# Patient Record
Sex: FEMALE | Race: BLACK OR AFRICAN AMERICAN | NOT HISPANIC OR LATINO | URBAN - METROPOLITAN AREA
[De-identification: names, ages, dates, MRNs, and addresses within clinical notes are randomized per-mention and may not be internally consistent; named-entity substitution may affect disease eponyms.]

---

## 2018-01-08 ENCOUNTER — EMERGENCY (EMERGENCY)
Facility: HOSPITAL | Age: 43
LOS: 1 days | Discharge: HOME | End: 2018-01-08
Admitting: EMERGENCY MEDICINE
Payer: COMMERCIAL

## 2018-01-08 VITALS
TEMPERATURE: 98 F | OXYGEN SATURATION: 96 % | RESPIRATION RATE: 18 BRPM | DIASTOLIC BLOOD PRESSURE: 76 MMHG | SYSTOLIC BLOOD PRESSURE: 147 MMHG | WEIGHT: 205.25 LBS | HEART RATE: 94 BPM

## 2018-01-08 DIAGNOSIS — R05 COUGH: ICD-10-CM

## 2018-01-08 DIAGNOSIS — Z79.2 LONG TERM (CURRENT) USE OF ANTIBIOTICS: ICD-10-CM

## 2018-01-08 DIAGNOSIS — J06.9 ACUTE UPPER RESPIRATORY INFECTION, UNSPECIFIED: ICD-10-CM

## 2018-01-08 DIAGNOSIS — Z88.0 ALLERGY STATUS TO PENICILLIN: ICD-10-CM

## 2018-01-08 DIAGNOSIS — Z79.899 OTHER LONG TERM (CURRENT) DRUG THERAPY: ICD-10-CM

## 2018-01-08 PROCEDURE — 71046 X-RAY EXAM CHEST 2 VIEWS: CPT

## 2018-01-08 PROCEDURE — 99284 EMERGENCY DEPT VISIT MOD MDM: CPT

## 2018-01-08 PROCEDURE — 71046 X-RAY EXAM CHEST 2 VIEWS: CPT | Mod: 26

## 2018-01-08 PROCEDURE — 99283 EMERGENCY DEPT VISIT LOW MDM: CPT | Mod: 25

## 2018-01-08 RX ORDER — AZITHROMYCIN 500 MG/1
1 TABLET, FILM COATED ORAL
Qty: 6 | Refills: 0
Start: 2018-01-08

## 2018-01-08 NOTE — ED PROVIDER NOTE - MEDICAL DECISION MAKING DETAILS
41 y/o f cough, fever x 2 weeks; cxr shows ?right middle lobe infiltrate, treated with zpack, pt coughing throughout visit, given cough medication, f/u pmd

## 2018-01-08 NOTE — ED PROVIDER NOTE - OBJECTIVE STATEMENT
43 y/o f presents c/o cough productive of yellowish sputum for the past 2 weeks.  Pt stating cough worsens at night, also has been feeling chills.  Denies n/v/d, CP, SOB, all other ROS negative.

## 2018-03-06 NOTE — ED PROVIDER NOTE - HISTORY ATTESTATION, MLM
I have reviewed and confirmed nurses' notes... no discharge, no irritation, no pain, no redness, and no visual changes.

## 2018-12-19 ENCOUNTER — EMERGENCY (EMERGENCY)
Facility: HOSPITAL | Age: 43
LOS: 1 days | Discharge: HOME | End: 2018-12-19
Attending: EMERGENCY MEDICINE | Admitting: EMERGENCY MEDICINE
Payer: COMMERCIAL

## 2018-12-19 VITALS
OXYGEN SATURATION: 100 % | SYSTOLIC BLOOD PRESSURE: 153 MMHG | RESPIRATION RATE: 18 BRPM | TEMPERATURE: 98 F | HEART RATE: 88 BPM | DIASTOLIC BLOOD PRESSURE: 91 MMHG

## 2018-12-19 DIAGNOSIS — R53.1 WEAKNESS: ICD-10-CM

## 2018-12-19 DIAGNOSIS — Z79.899 OTHER LONG TERM (CURRENT) DRUG THERAPY: ICD-10-CM

## 2018-12-19 DIAGNOSIS — Z79.2 LONG TERM (CURRENT) USE OF ANTIBIOTICS: ICD-10-CM

## 2018-12-19 DIAGNOSIS — Z88.0 ALLERGY STATUS TO PENICILLIN: ICD-10-CM

## 2018-12-19 DIAGNOSIS — R55 SYNCOPE AND COLLAPSE: ICD-10-CM

## 2018-12-19 LAB
ALBUMIN SERPL ELPH-MCNC: 3.6 G/DL — SIGNIFICANT CHANGE UP (ref 3.4–5)
ALP SERPL-CCNC: 85 U/L — SIGNIFICANT CHANGE UP (ref 40–120)
ALT FLD-CCNC: 27 U/L — SIGNIFICANT CHANGE UP (ref 12–42)
ANION GAP SERPL CALC-SCNC: 11 MMOL/L — SIGNIFICANT CHANGE UP (ref 9–16)
APPEARANCE UR: CLEAR — SIGNIFICANT CHANGE UP
AST SERPL-CCNC: 20 U/L — SIGNIFICANT CHANGE UP (ref 15–37)
BASOPHILS NFR BLD AUTO: 0.6 % — SIGNIFICANT CHANGE UP (ref 0–2)
BILIRUB SERPL-MCNC: 0.2 MG/DL — SIGNIFICANT CHANGE UP (ref 0.2–1.2)
BILIRUB UR-MCNC: NEGATIVE — SIGNIFICANT CHANGE UP
BUN SERPL-MCNC: 11 MG/DL — SIGNIFICANT CHANGE UP (ref 7–23)
CALCIUM SERPL-MCNC: 8.8 MG/DL — SIGNIFICANT CHANGE UP (ref 8.5–10.5)
CHLORIDE SERPL-SCNC: 106 MMOL/L — SIGNIFICANT CHANGE UP (ref 96–108)
CO2 SERPL-SCNC: 26 MMOL/L — SIGNIFICANT CHANGE UP (ref 22–31)
COLOR SPEC: YELLOW — SIGNIFICANT CHANGE UP
CREAT SERPL-MCNC: 0.77 MG/DL — SIGNIFICANT CHANGE UP (ref 0.5–1.3)
DIFF PNL FLD: NEGATIVE — SIGNIFICANT CHANGE UP
EOSINOPHIL NFR BLD AUTO: 4.8 % — SIGNIFICANT CHANGE UP (ref 0–6)
GLUCOSE SERPL-MCNC: 95 MG/DL — SIGNIFICANT CHANGE UP (ref 70–99)
GLUCOSE UR QL: NEGATIVE — SIGNIFICANT CHANGE UP
HCG UR QL: NEGATIVE — SIGNIFICANT CHANGE UP
HCT VFR BLD CALC: 30.4 % — LOW (ref 34.5–45)
HGB BLD-MCNC: 8.7 G/DL — LOW (ref 11.5–15.5)
HIV 1 & 2 AB SERPL IA.RAPID: SIGNIFICANT CHANGE UP
IMM GRANULOCYTES NFR BLD AUTO: 0.2 % — SIGNIFICANT CHANGE UP (ref 0–1.5)
KETONES UR-MCNC: NEGATIVE — SIGNIFICANT CHANGE UP
LEUKOCYTE ESTERASE UR-ACNC: NEGATIVE — SIGNIFICANT CHANGE UP
LYMPHOCYTES # BLD AUTO: 33.9 % — SIGNIFICANT CHANGE UP (ref 13–44)
MAGNESIUM SERPL-MCNC: 1.7 MG/DL — SIGNIFICANT CHANGE UP (ref 1.6–2.6)
MCHC RBC-ENTMCNC: 19.6 PG — LOW (ref 27–34)
MCHC RBC-ENTMCNC: 28.6 G/DL — LOW (ref 32–36)
MCV RBC AUTO: 68.3 FL — LOW (ref 80–100)
MONOCYTES NFR BLD AUTO: 5.6 % — SIGNIFICANT CHANGE UP (ref 2–14)
NEUTROPHILS NFR BLD AUTO: 54.9 % — SIGNIFICANT CHANGE UP (ref 43–77)
NITRITE UR-MCNC: NEGATIVE — SIGNIFICANT CHANGE UP
PH UR: 7 — SIGNIFICANT CHANGE UP (ref 5–8)
PLATELET # BLD AUTO: 523 K/UL — HIGH (ref 150–400)
POTASSIUM SERPL-MCNC: 3.7 MMOL/L — SIGNIFICANT CHANGE UP (ref 3.5–5.3)
POTASSIUM SERPL-SCNC: 3.7 MMOL/L — SIGNIFICANT CHANGE UP (ref 3.5–5.3)
PROT SERPL-MCNC: 7.5 G/DL — SIGNIFICANT CHANGE UP (ref 6.4–8.2)
PROT UR-MCNC: NEGATIVE MG/DL — SIGNIFICANT CHANGE UP
RBC # BLD: 4.45 M/UL — SIGNIFICANT CHANGE UP (ref 3.8–5.2)
RBC # FLD: 17.7 % — HIGH (ref 10.3–14.5)
SODIUM SERPL-SCNC: 143 MMOL/L — SIGNIFICANT CHANGE UP (ref 132–145)
SP GR SPEC: 1.02 — SIGNIFICANT CHANGE UP (ref 1–1.03)
TSH SERPL-MCNC: 1.12 UIU/ML — SIGNIFICANT CHANGE UP (ref 0.36–3.74)
UROBILINOGEN FLD QL: 0.2 E.U./DL — SIGNIFICANT CHANGE UP
WBC # BLD: 5 K/UL — SIGNIFICANT CHANGE UP (ref 3.8–10.5)
WBC # FLD AUTO: 5 K/UL — SIGNIFICANT CHANGE UP (ref 3.8–10.5)

## 2018-12-19 PROCEDURE — 93010 ELECTROCARDIOGRAM REPORT: CPT

## 2018-12-19 PROCEDURE — 99284 EMERGENCY DEPT VISIT MOD MDM: CPT | Mod: 25

## 2018-12-19 RX ORDER — SODIUM CHLORIDE 9 MG/ML
1000 INJECTION INTRAMUSCULAR; INTRAVENOUS; SUBCUTANEOUS ONCE
Qty: 0 | Refills: 0 | Status: COMPLETED | OUTPATIENT
Start: 2018-12-19 | End: 2018-12-19

## 2018-12-19 RX ADMIN — SODIUM CHLORIDE 1000 MILLILITER(S): 9 INJECTION INTRAMUSCULAR; INTRAVENOUS; SUBCUTANEOUS at 18:00

## 2018-12-19 NOTE — ED PROVIDER NOTE - MEDICAL DECISION MAKING DETAILS
Pt presenting with dizziness at work, most consistent with vasovagal episode. Likely compounded by anemia. Will check basic labs and EKG, and give IV fluids.

## 2018-12-19 NOTE — ED PROVIDER NOTE - OBJECTIVE STATEMENT
42 y/o female with PMHx of menorrhagia presents to the ED with complaints of dizziness and near syncopal episode today. Pt states she was on the phone when she developed dizziness, and when she tried to get up, she felt faint and "saw stars like cartoon character do." She admits to feeling fatigued for 2 weeks. She was scheduled to have an iron infusion done but never followed up. No LOC, no fever, no chills, and no vomiting. 42 y/o female with PMHx of menorrhagia presents to the ED with complaints of dizziness and near syncopal episode today. Pt states she was on the phone at work when she developed dizziness, and when she tried to get up, she felt faint and "saw stars like cartoon character do." She admits to feeling fatigued for 2 weeks. She was scheduled to have an iron infusion done but never followed up. No LOC, no fever, no chills, and no vomiting.

## 2019-10-05 VITALS
HEART RATE: 104 BPM | RESPIRATION RATE: 18 BRPM | TEMPERATURE: 98 F | DIASTOLIC BLOOD PRESSURE: 80 MMHG | OXYGEN SATURATION: 97 % | SYSTOLIC BLOOD PRESSURE: 132 MMHG

## 2019-10-05 LAB
ALBUMIN SERPL ELPH-MCNC: 3.2 G/DL — LOW (ref 3.4–5)
ALP SERPL-CCNC: 79 U/L — SIGNIFICANT CHANGE UP (ref 40–120)
ALT FLD-CCNC: 17 U/L — SIGNIFICANT CHANGE UP (ref 12–42)
ANION GAP SERPL CALC-SCNC: 11 MMOL/L — SIGNIFICANT CHANGE UP (ref 9–16)
APTT BLD: 27.3 SEC — LOW (ref 27.5–36.3)
AST SERPL-CCNC: 16 U/L — SIGNIFICANT CHANGE UP (ref 15–37)
BILIRUB SERPL-MCNC: 0.2 MG/DL — SIGNIFICANT CHANGE UP (ref 0.2–1.2)
BUN SERPL-MCNC: 14 MG/DL — SIGNIFICANT CHANGE UP (ref 7–23)
CALCIUM SERPL-MCNC: 8.3 MG/DL — LOW (ref 8.5–10.5)
CHLORIDE SERPL-SCNC: 108 MMOL/L — SIGNIFICANT CHANGE UP (ref 96–108)
CK SERPL-CCNC: 107 U/L — SIGNIFICANT CHANGE UP (ref 26–192)
CO2 SERPL-SCNC: 23 MMOL/L — SIGNIFICANT CHANGE UP (ref 22–31)
CREAT SERPL-MCNC: 0.85 MG/DL — SIGNIFICANT CHANGE UP (ref 0.5–1.3)
D DIMER BLD IA.RAPID-MCNC: 1418 NG/ML DDU — HIGH
GLUCOSE SERPL-MCNC: 87 MG/DL — SIGNIFICANT CHANGE UP (ref 70–99)
HCT VFR BLD CALC: 25.1 % — LOW (ref 34.5–45)
HGB BLD-MCNC: 7.2 G/DL — LOW (ref 11.5–15.5)
INR BLD: 0.97 — SIGNIFICANT CHANGE UP (ref 0.88–1.16)
MCHC RBC-ENTMCNC: 19.3 PG — LOW (ref 27–34)
MCHC RBC-ENTMCNC: 28.7 G/DL — LOW (ref 32–36)
MCV RBC AUTO: 67.3 FL — LOW (ref 80–100)
NT-PROBNP SERPL-SCNC: 508 PG/ML — HIGH
PLATELET # BLD AUTO: 470 K/UL — HIGH (ref 150–400)
POTASSIUM SERPL-MCNC: 4 MMOL/L — SIGNIFICANT CHANGE UP (ref 3.5–5.3)
POTASSIUM SERPL-SCNC: 4 MMOL/L — SIGNIFICANT CHANGE UP (ref 3.5–5.3)
PROT SERPL-MCNC: 7.4 G/DL — SIGNIFICANT CHANGE UP (ref 6.4–8.2)
PROTHROM AB SERPL-ACNC: 10.7 SEC — SIGNIFICANT CHANGE UP (ref 10–12.9)
RBC # BLD: 3.73 M/UL — LOW (ref 3.8–5.2)
RBC # FLD: 16.3 % — HIGH (ref 10.3–14.5)
SODIUM SERPL-SCNC: 142 MMOL/L — SIGNIFICANT CHANGE UP (ref 132–145)
TROPONIN I SERPL-MCNC: <0.017 NG/ML — LOW (ref 0.02–0.06)
WBC # BLD: 6.5 K/UL — SIGNIFICANT CHANGE UP (ref 3.8–10.5)
WBC # FLD AUTO: 6.5 K/UL — SIGNIFICANT CHANGE UP (ref 3.8–10.5)

## 2019-10-05 PROCEDURE — 71046 X-RAY EXAM CHEST 2 VIEWS: CPT | Mod: 26

## 2019-10-05 PROCEDURE — 76856 US EXAM PELVIC COMPLETE: CPT | Mod: 26

## 2019-10-05 PROCEDURE — 76830 TRANSVAGINAL US NON-OB: CPT | Mod: 26

## 2019-10-05 PROCEDURE — 71275 CT ANGIOGRAPHY CHEST: CPT | Mod: 26

## 2019-10-05 PROCEDURE — 73590 X-RAY EXAM OF LOWER LEG: CPT | Mod: 26,RT

## 2019-10-05 RX ORDER — IBUPROFEN 200 MG
600 TABLET ORAL ONCE
Refills: 0 | Status: COMPLETED | OUTPATIENT
Start: 2019-10-05 | End: 2019-10-05

## 2019-10-05 RX ORDER — ACETAMINOPHEN WITH CODEINE 300MG-30MG
1 TABLET ORAL ONCE
Refills: 0 | Status: DISCONTINUED | OUTPATIENT
Start: 2019-10-05 | End: 2019-10-05

## 2019-10-05 RX ADMIN — Medication 600 MILLIGRAM(S): at 20:40

## 2019-10-05 RX ADMIN — Medication 600 MILLIGRAM(S): at 20:19

## 2019-10-05 RX ADMIN — Medication 1 TABLET(S): at 20:19

## 2019-10-05 RX ADMIN — Medication 300 MILLIGRAM(S): at 20:18

## 2019-10-05 RX ADMIN — Medication 0.5 MILLIGRAM(S): at 22:36

## 2019-10-05 RX ADMIN — Medication 1 TABLET(S): at 20:40

## 2019-10-05 RX ADMIN — Medication 1 TABLET(S): at 20:18

## 2019-10-05 NOTE — ED ADULT NURSE NOTE - OBJECTIVE STATEMENT
5 y/o Female with PMHx of Anemia, Arthritis, Menorrhagia and Mitral valve regurgitation, allergy to penicillin, presents to the ED c/o cat bite wound to the R ankle x4 days, and SOB with exertion x2 weeks. States the wound is swelling and has redness around the area. Of note, pt had cat bites and scratches to the other leg as well but never had sx. Also states the SOB is unusual for her since she is an active person but lately even after going up some stairs, there is an onset of SOB. Pt also complains of pain to the lower back and suspects possible fibroids. Of note, pt had Myomectomy in October 2018. States her Gynecologist put her on OCP but she is still bleeding excessively during her menses. Today, pt took 800 mg of Motrin with no relief. Denies chance of pregnancy. Denies fever, chills, N/V/D, palpitations, diaphoresis, wheezing, peripheral edema, numbness, tingling, HA, dizziness, neck pain, abdominal pain, change in urinary/bowel function, fall, and rash.  Of note, pt is suppose to follow up with a Cardiologist for lower leg swelling but has been non-compliant and request a referral.

## 2019-10-05 NOTE — ED PROVIDER NOTE - OBJECTIVE STATEMENT
43 y/o Female with PMHx of Anemia, Arthritis, Menorrhagia and Mitral valve regurgitation, allergy to penicillin, presents to the ED c/o cat bite wound to the R ankle x4 days, and SOB with exertion x2 weeks. States the wound is swelling and has redness around the area. Of note, pt had cat bites and scratches to the other leg as well but never had sx. Also states the SOB is unusual for her since she is an active person but lately even after going up some stairs, there is an onset of SOB. Pt also complains of pain to the lower back and suspects possible fibroids. Of note, pt had Myomectomy in October 2018. States her Gynecologist put her on OCP but she is still bleeding excessively during her menses. Today, pt took 800 mg of Motrin with no relief. Denies chance of pregnancy. Denies fever, chills, N/V/D, palpitations, diaphoresis, wheezing, peripheral edema, numbness, tingling, HA, dizziness, neck pain, abdominal pain, change in urinary/bowel function, fall, and rash.  Of note, pt is suppose to follow up with a Cardiologist for lower leg swelling but has been non-compliant and request a referral.

## 2019-10-05 NOTE — ED PROVIDER NOTE - PROGRESS NOTE DETAILS
spoke to dr andrews, requested regional medicine, start heparin drip. patient with no active menses. patient consented to admission and transfer.

## 2019-10-05 NOTE — ED PROVIDER NOTE - CLINICAL SUMMARY MEDICAL DECISION MAKING FREE TEXT BOX
bilateral PE's on CT scan in ED today, cat bite to R leg with cellulitis, severe symptomatic anemia, requires admission to monitored bed

## 2019-10-05 NOTE — ED ADULT TRIAGE NOTE - CHIEF COMPLAINT QUOTE
Patient to ED with multiple medical complaints including dyspnea on exertion, cat bite to right leg, and fibroid pain in abdomen.  Patient denies CP and in no acute distress

## 2019-10-05 NOTE — ED PROVIDER NOTE - CRITICAL CARE PROVIDED
additional history taking/interpretation of diagnostic studies/direct patient care (not related to procedure)/consultation with other physicians/documentation

## 2019-10-05 NOTE — ED PROVIDER NOTE - CARE PLAN
Principal Discharge DX:	Acute pulmonary embolism without acute cor pulmonale, unspecified pulmonary embolism type  Secondary Diagnosis:	Iron deficiency anemia due to chronic blood loss  Secondary Diagnosis:	Cellulitis of right lower extremity

## 2019-10-05 NOTE — ED PROVIDER NOTE - CHPI ED SYMPTOMS NEG
no fever, chills, N/V/D, palpitations, diaphoresis, wheezing, peripheral edema, numbness, tingling, HA, dizziness, neck pain, abdominal pain, change in urinary/bowel function, fall, and rash.

## 2019-10-05 NOTE — ED ADULT NURSE NOTE - NSIMPLEMENTINTERV_GEN_ALL_ED
Implemented All Universal Safety Interventions:  [place] to call system. Call bell, personal items and telephone within reach. Instruct patient to call for assistance. Room bathroom lighting operational. Non-slip footwear when patient is off stretcher. Physically safe environment: no spills, clutter or unnecessary equipment. Stretcher in lowest position, wheels locked, appropriate side rails in place.

## 2019-10-06 ENCOUNTER — INPATIENT (INPATIENT)
Facility: HOSPITAL | Age: 44
LOS: 3 days | Discharge: HOME | DRG: 982 | End: 2019-10-10
Attending: INTERNAL MEDICINE | Admitting: INTERNAL MEDICINE
Payer: COMMERCIAL

## 2019-10-06 DIAGNOSIS — R09.89 OTHER SPECIFIED SYMPTOMS AND SIGNS INVOLVING THE CIRCULATORY AND RESPIRATORY SYSTEMS: ICD-10-CM

## 2019-10-06 DIAGNOSIS — R63.8 OTHER SYMPTOMS AND SIGNS CONCERNING FOOD AND FLUID INTAKE: ICD-10-CM

## 2019-10-06 DIAGNOSIS — L03.115 CELLULITIS OF RIGHT LOWER LIMB: ICD-10-CM

## 2019-10-06 DIAGNOSIS — I26.99 OTHER PULMONARY EMBOLISM WITHOUT ACUTE COR PULMONALE: ICD-10-CM

## 2019-10-06 DIAGNOSIS — B20 HUMAN IMMUNODEFICIENCY VIRUS [HIV] DISEASE: Chronic | ICD-10-CM

## 2019-10-06 DIAGNOSIS — Z98.890 OTHER SPECIFIED POSTPROCEDURAL STATES: Chronic | ICD-10-CM

## 2019-10-06 DIAGNOSIS — D50.0 IRON DEFICIENCY ANEMIA SECONDARY TO BLOOD LOSS (CHRONIC): ICD-10-CM

## 2019-10-06 DIAGNOSIS — N92.0 EXCESSIVE AND FREQUENT MENSTRUATION WITH REGULAR CYCLE: ICD-10-CM

## 2019-10-06 DIAGNOSIS — Z91.89 OTHER SPECIFIED PERSONAL RISK FACTORS, NOT ELSEWHERE CLASSIFIED: ICD-10-CM

## 2019-10-06 LAB
APTT BLD: 79.7 SEC — HIGH (ref 27.5–36.3)
BLD GP AB SCN SERPL QL: NEGATIVE — SIGNIFICANT CHANGE UP
BLD GP AB SCN SERPL QL: NEGATIVE — SIGNIFICANT CHANGE UP
HCT VFR BLD CALC: 23.9 % — LOW (ref 34.5–45)
HCT VFR BLD CALC: 26.7 % — LOW (ref 34.5–45)
HCT VFR BLD CALC: 31.7 % — LOW (ref 34.5–45)
HGB BLD-MCNC: 6.7 G/DL — CRITICAL LOW (ref 11.5–15.5)
HGB BLD-MCNC: 7.5 G/DL — LOW (ref 11.5–15.5)
HGB BLD-MCNC: 9.3 G/DL — LOW (ref 11.5–15.5)
HIV 1+2 AB+HIV1 P24 AG SERPL QL IA: SIGNIFICANT CHANGE UP
MCHC RBC-ENTMCNC: 18.7 PG — LOW (ref 27–34)
MCHC RBC-ENTMCNC: 19.9 PG — LOW (ref 27–34)
MCHC RBC-ENTMCNC: 21.2 PG — LOW (ref 27–34)
MCHC RBC-ENTMCNC: 28 G/DL — LOW (ref 32–36)
MCHC RBC-ENTMCNC: 28.1 GM/DL — LOW (ref 32–36)
MCHC RBC-ENTMCNC: 29.3 GM/DL — LOW (ref 32–36)
MCV RBC AUTO: 66.8 FL — LOW (ref 80–100)
MCV RBC AUTO: 70.8 FL — LOW (ref 80–100)
MCV RBC AUTO: 72.4 FL — LOW (ref 80–100)
NRBC # BLD: 0 /100 WBCS — SIGNIFICANT CHANGE UP (ref 0–0)
NRBC # BLD: 0 /100 WBCS — SIGNIFICANT CHANGE UP (ref 0–0)
PLATELET # BLD AUTO: 428 K/UL — HIGH (ref 150–400)
PLATELET # BLD AUTO: 430 K/UL — HIGH (ref 150–400)
PLATELET # BLD AUTO: 453 K/UL — HIGH (ref 150–400)
RBC # BLD: 3.58 M/UL — LOW (ref 3.8–5.2)
RBC # BLD: 3.77 M/UL — LOW (ref 3.8–5.2)
RBC # BLD: 4.38 M/UL — SIGNIFICANT CHANGE UP (ref 3.8–5.2)
RBC # FLD: 16.4 % — HIGH (ref 10.3–14.5)
RBC # FLD: 18.3 % — HIGH (ref 10.3–14.5)
RBC # FLD: 19.5 % — HIGH (ref 10.3–14.5)
RH IG SCN BLD-IMP: POSITIVE — SIGNIFICANT CHANGE UP
RH IG SCN BLD-IMP: POSITIVE — SIGNIFICANT CHANGE UP
WBC # BLD: 6.09 K/UL — SIGNIFICANT CHANGE UP (ref 3.8–10.5)
WBC # BLD: 6.4 K/UL — SIGNIFICANT CHANGE UP (ref 3.8–10.5)
WBC # BLD: 7.05 K/UL — SIGNIFICANT CHANGE UP (ref 3.8–10.5)
WBC # FLD AUTO: 6.09 K/UL — SIGNIFICANT CHANGE UP (ref 3.8–10.5)
WBC # FLD AUTO: 6.4 K/UL — SIGNIFICANT CHANGE UP (ref 3.8–10.5)
WBC # FLD AUTO: 7.05 K/UL — SIGNIFICANT CHANGE UP (ref 3.8–10.5)

## 2019-10-06 PROCEDURE — 99222 1ST HOSP IP/OBS MODERATE 55: CPT

## 2019-10-06 PROCEDURE — 93010 ELECTROCARDIOGRAM REPORT: CPT

## 2019-10-06 PROCEDURE — 99291 CRITICAL CARE FIRST HOUR: CPT

## 2019-10-06 PROCEDURE — 99223 1ST HOSP IP/OBS HIGH 75: CPT | Mod: GC

## 2019-10-06 RX ORDER — HEPARIN SODIUM 5000 [USP'U]/ML
INJECTION INTRAVENOUS; SUBCUTANEOUS
Qty: 25000 | Refills: 0 | Status: DISCONTINUED | OUTPATIENT
Start: 2019-10-06 | End: 2019-10-07

## 2019-10-06 RX ORDER — SODIUM CHLORIDE 9 MG/ML
500 INJECTION, SOLUTION INTRAVENOUS ONCE
Refills: 0 | Status: COMPLETED | OUTPATIENT
Start: 2019-10-06 | End: 2019-10-06

## 2019-10-06 RX ORDER — INFLUENZA VIRUS VACCINE 15; 15; 15; 15 UG/.5ML; UG/.5ML; UG/.5ML; UG/.5ML
0.5 SUSPENSION INTRAMUSCULAR ONCE
Refills: 0 | Status: COMPLETED | OUTPATIENT
Start: 2019-10-06 | End: 2019-10-06

## 2019-10-06 RX ORDER — OXYCODONE AND ACETAMINOPHEN 5; 325 MG/1; MG/1
1 TABLET ORAL ONCE
Refills: 0 | Status: DISCONTINUED | OUTPATIENT
Start: 2019-10-06 | End: 2019-10-06

## 2019-10-06 RX ORDER — METRONIDAZOLE 500 MG
500 TABLET ORAL EVERY 8 HOURS
Refills: 0 | Status: DISCONTINUED | OUTPATIENT
Start: 2019-10-06 | End: 2019-10-10

## 2019-10-06 RX ORDER — ACETAMINOPHEN 500 MG
650 TABLET ORAL EVERY 6 HOURS
Refills: 0 | Status: DISCONTINUED | OUTPATIENT
Start: 2019-10-06 | End: 2019-10-06

## 2019-10-06 RX ADMIN — OXYCODONE AND ACETAMINOPHEN 1 TABLET(S): 5; 325 TABLET ORAL at 11:14

## 2019-10-06 RX ADMIN — Medication 1 TABLET(S): at 23:02

## 2019-10-06 RX ADMIN — Medication 500 MILLIGRAM(S): at 14:50

## 2019-10-06 RX ADMIN — HEPARIN SODIUM 1800 UNIT(S)/HR: 5000 INJECTION INTRAVENOUS; SUBCUTANEOUS at 14:50

## 2019-10-06 RX ADMIN — OXYCODONE AND ACETAMINOPHEN 1 TABLET(S): 5; 325 TABLET ORAL at 12:14

## 2019-10-06 RX ADMIN — OXYCODONE AND ACETAMINOPHEN 1 TABLET(S): 5; 325 TABLET ORAL at 18:06

## 2019-10-06 RX ADMIN — Medication 1 TABLET(S): at 12:37

## 2019-10-06 RX ADMIN — SODIUM CHLORIDE 500 MILLILITER(S): 9 INJECTION, SOLUTION INTRAVENOUS at 06:51

## 2019-10-06 RX ADMIN — Medication 500 MILLIGRAM(S): at 21:31

## 2019-10-06 NOTE — H&P ADULT - ASSESSMENT
43 yo F with PMH of fibroids and MVP who presents with complaints of shortness of breath and RLE pain and swelling secondary to a cat bite, with confirmed PE on CT imaging and concerns for lower extremity cellulitis.

## 2019-10-06 NOTE — CONSULT NOTE ADULT - PROBLEM SELECTOR RECOMMENDATION 9
Right segmental and subsegmental PE provoked by OCP use.   Very low risk PE with PESI score 44Trop negative,   No evidence of right heart strain on CT scan    Recommendations:   - OK to start heparin drip. discussed with Gyn team in setting of vaginal bleeding requiring transfusion, heparin drip is OK and benefit outweighs risks.   - ECHO to evaluate for right heart strain.   -encourage early ambulation with PT.

## 2019-10-06 NOTE — H&P ADULT - PROBLEM SELECTOR PLAN 2
Patient with long history of menorrhagia and 1 month reported history of active bleeding. Currently severely anemic with need for transfusion.  - 1 U PRBC ordered  - patient initially refusing transfusion as concerned about HIV/AIDs as mother became HIV+ s/p transfusion many years ago, but explained that blood is screened for HIV/Hep C prior and risk is very low  - f/u post transfusion CBC  - maintain active T+S Patient with long history of menorrhagia and 1 month reported history of active bleeding. Currently severely anemic with need for transfusion.  - 1 U PRBC ordered  - patient initially refusing transfusion as concerned about HIV/AIDs as mother became HIV+ s/p transfusion many years ago, but explained that blood is screened for HIV/Hep C prior and risk is very low  - f/u post transfusion CBC  - maintain active T+S  - will not continue OCP given noncoagulable state  - f/u gyn consult

## 2019-10-06 NOTE — CONSULT NOTE ADULT - PROBLEM SELECTOR RECOMMENDATION 2
vaginal bleeding from multiple uterine fibroids. Moniter Hb closely and quantify vaginal bleed as number of pads soaked. If significant increase/ hemodynamic instability, may have to hold heparin drip and be considered for IVC filter.     The patient was s/e/d with Dr Miramontes.

## 2019-10-06 NOTE — H&P ADULT - PROBLEM SELECTOR PLAN 3
Patient with suspected cellulitis of lower extremity s/p cat bite without any purulence, edema or fluctuance.   - patient without leukocytosis, fever, or hemodynamic instability  - Per IDSA guidelines, antibiotics not recommended given lack of warning features, absent patient factors (immunocompromised, severe hepatic dysfunction, etc.) Patient with suspected cellulitis of lower extremity s/p cat bite without any purulence, edema or fluctuance.   - patient without leukocytosis, fever, or hemodynamic instability  - will c/w bactrim and flagyl for total 5 day course

## 2019-10-06 NOTE — CONSULT NOTE ADULT - ASSESSMENT
The patient is a 44 yr old female with hx of intermittent bleeding from uterine fibroids and anemia, Mitrasl valve prolapse, cat bites on leg who presents to the hospital for evaluation of dyspnea and cat bites.

## 2019-10-06 NOTE — H&P ADULT - NSHPLABSRESULTS_GEN_ALL_CORE
6.7    6.4   )-----------( 430      ( 06 Oct 2019 01:11 )             23.9       10-05    142  |  108  |  14  ----------------------------<  87  4.0   |  23  |  0.85    Ca    8.3<L>      05 Oct 2019 20:26    TPro  7.4  /  Alb  3.2<L>  /  TBili  0.2  /  DBili  x   /  AST  16  /  ALT  17  /  AlkPhos  79  10-05              Urinalysis Basic - ( 05 Oct 2019 19:18 )    Color: Yellow / Appearance: Clear / SG: >=1.030 / pH: x  Gluc: x / Ketone: Trace mg/dL  / Bili: NEGATIVE / Urobili: 0.2 E.U./dL   Blood: x / Protein: Trace mg/dL / Nitrite: NEGATIVE   Leuk Esterase: NEGATIVE / RBC: Many /HPF / WBC < 5 /HPF   Sq Epi: x / Non Sq Epi: 5-10 /HPF / Bacteria: x        PT/INR - ( 05 Oct 2019 20:26 )   PT: 10.7 sec;   INR: 0.97          PTT - ( 05 Oct 2019 20:26 )  PTT:27.3 sec    Lactate Trend      CARDIAC MARKERS ( 05 Oct 2019 20:26 )  <0.017 ng/mL / x     / 107 U/L / x     / x            CAPILLARY BLOOD GLUCOSE            < from: CT Angio Chest PE Protocol w/ IV Cont (10.05.19 @ 23:09) >    Pulmonary emboli within the segmental/subsegmental branches of the right   middle and lower lobes and possibly in the subsegmental branches of the   left lower lobe. No CT evidence of right heart strain or pulmonary   infarction.    < end of copied text >    < from: US Transvaginal (10.05.19 @ 19:45) >    1.  2.3 cm heterogeneous structure located just to the right of the   endometrium likely represents a submucosal fibroid andrather than an   endometrial lesion. Further evaluation with direct visualization or   pelvic MRI can be considered.  2.  Fibroid uterus as above.     < end of copied text >

## 2019-10-06 NOTE — H&P ADULT - PROBLEM SELECTOR PLAN 1
Patient with symptoms of shortness of breath and preceding LE swelling and trauma concerning for DVT, with confirmed PE (subsegmental and segmental) on CT imaging and no evidence of right heart strain. Patient also on OCPs for management of her menorrhagia which likely contributed to procoagulable state.   - will hold AC for now given active bleeding and need for transfusion  - f/u post transfusion CBC  - monitor for chest pain, tachycardia, changes in vitals  - EKG currently NSR without signs of heart strain  - f/u dopplers LE Patient with symptoms of shortness of breath and preceding LE swelling and trauma concerning for DVT, with confirmed PE (subsegmental and segmental) on CT imaging and no evidence of right heart strain. Patient also on OCPs for management of her menorrhagia which likely contributed to procoagulable state.   - will hold AC for now given active bleeding and need for transfusion  - f/u post transfusion CBC  - monitor for chest pain, tachycardia, changes in vitals  - EKG currently NSR without signs of heart strain  - f/u dopplers LE  - PESI score 44, very low risk for inpatient mortality  - f/u echo for evaluation of heart strain Patient with symptoms of shortness of breath and preceding LE swelling and trauma concerning for DVT, with confirmed PE (subsegmental and segmental) on CT imaging and no evidence of right heart strain. Patient also on OCPs for management of her menorrhagia which likely contributed to procoagulable state.   - given b/l and multiple PEs in addition to procoagulable state likely 2/2 OCP use, patient would benefit from AC, however, has risks of bleeding given active menorrhagia  - f/u Pulmonology recs; IVC filter versus anticoagulation   - monitor for chest pain, tachycardia, changes in vitals  - EKG currently NSR without signs of heart strain  - f/u dopplers LE  - PESI score 44, very low risk for inpatient mortality  - f/u echo for evaluation of heart strain

## 2019-10-06 NOTE — H&P ADULT - NSHPSOCIALHISTORY_GEN_ALL_CORE
Patient lives on the [facility] with her youngest son (two elder children are out of the house and working). Has been  for many years and her ex  has since remarried. Works in a correctional facility. Denies smoking or elicit drug use. Endorses casual alcohol use, but reports drinking a bottle of wine at a time should she be stressed. Not currently sexually active (has not been sexually active for years) and denies any history of STIs/STDs. Has had 5 pregnancies with three brought to term. First pregnancy was terminated in the second trimester because the fetus was not viable. Fifth and last pregnancy was terminated in the first trimester as patient "did not want more kids".

## 2019-10-06 NOTE — CONSULT NOTE ADULT - ASSESSMENT
44y  with LMP  started on OCPs at that time with h/o known fibroids s/p hysteroscopic myomectomy in 2017 who was admitted to Medicine for symptomatic anemia requiring blood transfusion in the setting of abnormal vaginal bleeding, found to have pulmonary emboli on admission. Gyn was consulted for persistent vaginal bleeding x1 month with dizziness, shortness of breath, and Hb 6.7.     #Abnormal uterine bleeding, symptomatic anemia  - Multiple small fibroids appreciated on transvaginal ultrasound likely the source of her abnormal vaginal bleeding.   - Medical management of acute abnormal uterine bleeding includes provera, combined OCPs, and lysteda though all are contraindicated in a patient with PE.   - Patient counseled on surgical management options which include D&C, endometrial ablation, uterine artery embolization, and hysterectomy. She had been planning on an endometrial ablation prior to this episode and would like to have that done.   - Discussed with patient that she will need an endometrial biopsy prior to that procedure and also must first be medically optimized.   - No acute gyn intervention recommended at this time given she is not a candidate for acute medical or surgical management. Recommend surgical management out-patient once patient has been medically optimized/cleared.  - Blood transfusion  - Obtain urine pregnancy test.    Patient seen by Darlyn Ortega PGY2  Plan discussed with Dr Ruiz

## 2019-10-06 NOTE — H&P ADULT - NSHPPHYSICALEXAM_GEN_ALL_CORE
.  VITAL SIGNS:  T(C): 36.4 (10-06-19 @ 04:20), Max: 37 (10-05-19 @ 21:17)  T(F): 97.5 (10-06-19 @ 04:20), Max: 98.6 (10-05-19 @ 21:17)  HR: 86 (10-06-19 @ 04:20) (86 - 104)  BP: 122/77 (10-06-19 @ 04:20) (122/77 - 132/80)  BP(mean): --  RR: 17 (10-06-19 @ 04:20) (16 - 18)  SpO2: 100% (10-06-19 @ 04:20) (97% - 100%)  Wt(kg): --    PHYSICAL EXAM:    Constitutional: WDWN fatigued woman resting comfortably in bed; NAD  Head: NC/AT  Eyes: PERRL, EOMI, anicteric sclera, conjunctival pallor  ENT: no nasal discharge; uvula midline, no oropharyngeal erythema or exudates; MMM  Neck: supple; no JVD or thyromegaly  Respiratory: CTA B/L; no W/R/R, no retractions  Cardiac: +S1/S2; RRR; mid systolic click; PMI non-displaced  Gastrointestinal: soft, NT/ND; no rebound or guarding; +BSx4  Genitourinary: normal external genitalia  Back: spine midline, no bony tenderness or step-offs; no CVAT B/L  Extremities: WWP, no clubbing or cyanosis; no peripheral edema; 2 puncture wounds on the anterior aspect of the R shin 3 cm above the ankle with 2-3 mm of surrounding erythema and minimal tenderness without discharge, purulence or fluctuance  Musculoskeletal: NROM x4; no joint swelling, tenderness or erythema  Vascular: 2+ radial, femoral, DP/PT pulses B/L  Dermatologic: skin warm, dry and intact; no rashes, wounds, or scars  Lymphatic: no submandibular or cervical LAD  Neurologic: AAOx3; CNII-XII grossly intact; no focal deficits  Psychiatric: affect and characteristics of appearance, verbalizations, behaviors are appropriate

## 2019-10-06 NOTE — H&P ADULT - NSICDXFAMILYHX_GEN_ALL_CORE_FT
FAMILY HISTORY:  Family history of AIDS-related complex  FH: cardiovascular disease  FH: myocardial infarction

## 2019-10-06 NOTE — CONSULT NOTE ADULT - SUBJECTIVE AND OBJECTIVE BOX
PULMONARY SERVICE INITIAL CONSULT NOTE:      HPI:   Patient is a 43 yo  F with PMH of fibroids and mitral valve prolapse who presents with complaints of cat bite to the RLE that happened 4 days ago, and 1 week of preceding shortness of breath that was accompanied by RLE swelling and pain, which has since resolved. Patient states that she has a long history of menorrhagia s/p myomectomy in 2017 which has since resumed as of 1 month ago. Patient was started on OCPs, which she believes have not helped and only exacerbated her bleeding. Her last LMP was  and reports daily bleeding, soaking up to 27 pads per week. Reports menstrual cramping that accompanied bleeding the other day which was unrelieved by motrin. Patient has not been sexually active for many years and believes the chances of her being pregnant are very low. Over 1 week ago, patient noticed pain in her calf accompanied by swelling which resolved within several days, but was accompanied by shortness of breath. In regards to her cat bite, patient reports numerous scratches and prior bites, but this is the first instance in which she has noticed swelling and erythema. She denies any purulence or discharge from the wound, and only complains of mild tenderness without pain at rest. The cat bite happened 4 days ago (after her shortness of breath) and has not limited her activity or mobility.     On ROS, patient denies fevers, chills, headaches, myalgias, suprapubic pain, dysuria, urinary frequency, or change in bowel habits. In addition to her shortness of breath, she reports feelings of fatigue and general malaise, and dyspnea on exertion which limits her to half a flight of stairs (at rest able to tolerate most physical activity without any limitations).     On my evaluation, the patient is resting comfortably in bed and denies any dyspnea. She is saturating well on RA, vitals stable.     TVUS:   < from: US Transvaginal (10.05.19 @ 19:45) >  1.  2.3 cm heterogeneous structure located just to the right of the   endometrium likely represents a submucosal fibroid andrather than an   endometrial lesion. Further evaluation with direct visualization or   pelvic MRI can be considered.  2.  Fibroid uterus as above.     EKG: NSR without evidence of r heart strain  Medications: Bactrim, clindamycin,   Consults: GYN (06 Oct 2019 08:09)      REVIEW OF SYSTEMS:  Constitutional: No fever, weight loss or fatigue  Eyes: No eye pain, visual disturbances, or discharge  ENMT:  No difficulty hearing, tinnitus, vertigo; No sinus or throat pain  Neck: No pain, stiffness or neck swelling  Respiratory: see HPI  Cardiovascular: No chest pain, palpitations, dizziness or leg swelling  Gastrointestinal: No abdominal or epigastric pain. No nausea, vomiting or hematemesis; No diarrhea or constipation. No melena or hematochezia.  Genitourinary: No dysuria, frequency, hematuria or incontinence  Neurological: No headaches, memory loss, loss of strength, numbness or tremors  Skin: No itching, burning, rashes or lesions   Lymph Nodes: No enlarged glands  Endocrine: No heat or cold intolerance; No hair loss  Musculoskeletal: No joint pain or swelling; No muscle, back or extremity pain  Psychiatric: No depression, anxiety, mood swings or difficulty sleeping  Heme/Lymph: No easy bruising or bleeding gums  Allergy and Immunologic: No hives or eczema    PAST MEDICAL & SURGICAL HISTORY:  Mitral valve regurgitation  Anemia  Menorrhagia  Arthritis  H/O toe surgery  H/O myomectomy      FAMILY HISTORY:  FH: myocardial infarction  Family history of AIDS-related complex  FH: cardiovascular disease      SOCIAL HISTORY:  Smoking Status: [ ] Current, [ ] Former, [x] Never  Pack Years:    MEDICATIONS:  Pulmonary:    Antimicrobials:  metroNIDAZOLE    Tablet 500 milliGRAM(s) Oral every 8 hours  trimethoprim  160 mG/sulfamethoxazole 800 mG 1 Tablet(s) Oral every 12 hours    Anticoagulants:  heparin  Infusion.  Unit(s)/Hr IV Continuous <Continuous>    Onc:    GI/:    Endocrine:    Cardiac:    Other Medications:  influenza   Vaccine 0.5 milliLiter(s) IntraMuscular once      Allergies    penicillin (Anaphylaxis)    Intolerances        Vital Signs Last 24 Hrs  T(C): 36.3 (06 Oct 2019 14:41), Max: 37 (05 Oct 2019 21:17)  T(F): 97.4 (06 Oct 2019 14:41), Max: 98.6 (05 Oct 2019 21:17)  HR: 77 (06 Oct 2019 14:41) (77 - 104)  BP: 126/82 (06 Oct 2019 14:41) (98/56 - 132/80)  BP(mean): --  RR: 14 (06 Oct 2019 14:41) (14 - 18)  SpO2: 100% (06 Oct 2019 14:41) (97% - 100%)    10-05 @ 07:01  -  10-06 @ 07:00  --------------------------------------------------------  IN: 500 mL / OUT: 0 mL / NET: 500 mL          PHYSICAL EXAM:  Constitutional: WDWN  Head: NC/AT  EENT: PERRL, anicteric sclera; oropharynx clear, MMM  Neck: supple, no appreciable JVD  Respiratory: CTA B/L; no W/R/R  Cardiovascular: +S1/S2, RRR  Gastrointestinal: soft, NT/ND; +BSx4  Extremities: WWP; no edema, clubbing or cyanosis  Vascular: 2+ radial, DP/PT pulses B/L  Neurological: AAOx3; no focal deficits    LABS:      CBC Full  -  ( 06 Oct 2019 12:20 )  WBC Count : 6.09 K/uL  RBC Count : 3.77 M/uL  Hemoglobin : 7.5 g/dL  Hematocrit : 26.7 %  Platelet Count - Automated : 428 K/uL  Mean Cell Volume : 70.8 fl  Mean Cell Hemoglobin : 19.9 pg  Mean Cell Hemoglobin Concentration : 28.1 gm/dL  Auto Neutrophil # : x  Auto Lymphocyte # : x  Auto Monocyte # : x  Auto Eosinophil # : x  Auto Basophil # : x  Auto Neutrophil % : x  Auto Lymphocyte % : x  Auto Monocyte % : x  Auto Eosinophil % : x  Auto Basophil % : x    10-05    142  |  108  |  14  ----------------------------<  87  4.0   |  23  |  0.85    Ca    8.3<L>      05 Oct 2019 20:26    TPro  7.4  /  Alb  3.2<L>  /  TBili  0.2  /  DBili  x   /  AST  16  /  ALT  17  /  AlkPhos  79  10-05    PT/INR - ( 05 Oct 2019 20:26 )   PT: 10.7 sec;   INR: 0.97          PTT - ( 05 Oct 2019 20:26 )  PTT:27.3 sec      Urinalysis Basic - ( 05 Oct 2019 19:18 )    Color: Yellow / Appearance: Clear / SG: >=1.030 / pH: x  Gluc: x / Ketone: Trace mg/dL  / Bili: NEGATIVE / Urobili: 0.2 E.U./dL   Blood: x / Protein: Trace mg/dL / Nitrite: NEGATIVE   Leuk Esterase: NEGATIVE / RBC: Many /HPF / WBC < 5 /HPF   Sq Epi: x / Non Sq Epi: 5-10 /HPF / Bacteria: x                RADIOLOGY & ADDITIONAL STUDIES:

## 2019-10-06 NOTE — CONSULT NOTE ADULT - SUBJECTIVE AND OBJECTIVE BOX
44y  with LMP  started on OCPs at that time with h/o known fibroids s/p hysteroscopic myomectomy in 2017 who was admitted to Medicine for symptomatic anemia requiring blood transfusion in the setting of abnormal vaginal bleeding, found to have pulmonary emboli on admission. Since her myomectomy 3 years ago she c/o regular heavy, painful periods with moderate vaginal bleeding that usually lasts 1 week. She was recently prescribed OCPs for her menorrhagia which she started on the first day of her last period which was . She c/o heavy vaginal bleeding since then with passage of large clots and going through 7 pads/day. The bleeding has been constant for the last month, similar to a moderate/heavy period. She says this has happened once in the past in 2019 where she had persistent vaginal bleeding x1 month that did not require a blood transfusion and resolved on its own. She also c/o dizziness and shortness of breath on exertion for the last 2 weeks though denies chest pain or palpitations. She says she has been feeling very tired and weak since the weekend, did not go to work this past Friday 10/4 and slept all of Friday and Saturday. This morning she still was not feeling well so came to the ED. She says she had one episode of R posterior calf cramping about 1 week ago which resolved after she massaged her leg. She also c/o swelling in both legs bilaterally that is worse after walking. Currently denies leg/calf pain though does have a cat bite on her R anterior shin that happened 4 days ago with minimal erythema and tenderness. She denies fever/chills or abdominal pain.    ObHx:  G1 Previable PTD @20wks   G2 PTD at 27wks due to cervical insufficiency c/b PPH (though did not require blood transfusion)  G3  at term   G4  at term  G5 VTOP D&C  GynHx: EMB and normal pap smear  (per patient), fibroids  PMH: anemia, mitral valve prolapse  PSH: hysteroscopic myomectomy (3 submucosal fibroids removed) ; surgery for broken toe  Allergic to PCN (anaphylaxis)  Meds: Ethindrone acetate (Taytulla) since     Vital Signs Last 24 Hrs  T(C): 36.3 (06 Oct 2019 10:08), Max: 37 (05 Oct 2019 21:17)  T(F): 97.4 (06 Oct 2019 10:08), Max: 98.6 (05 Oct 2019 21:17)  HR: 81 (06 Oct 2019 10:08) (81 - 104)  BP: 98/56 (06 Oct 2019 10:08) (98/56 - 132/80)  BP(mean): --  RR: 15 (06 Oct 2019 10:08) (15 - 18)  SpO2: 100% (06 Oct 2019 10:08) (97% - 100%)    Physical Exam:  Gen: NAD, appears tired  GI: soft, nontender, nondistended, obese abdomen, no rebound no guarding  BME: retroverted 11 week size uterus, posterior fibroid appreciated on exam, no adnexal masses appreciated, no cervical motion tenderness   Spec: cervical os visually closed, 10cc dark red blood clot in vaginal vault, minimal bright red staining, no abnormal discharge  Ext: LE edema bilaterally though right worse than left, 2mm puncture wound on R anterior shin 3cm above the ankle with minimal surrounding erythema     LABS:                        6.7    6.4   )-----------( 430      ( 06 Oct 2019 01:11 )             23.9     10-05    142  |  108  |  14  ----------------------------<  87  4.0   |  23  |  0.85    Ca    8.3<L>      05 Oct 2019 20:26    TPro  7.4  /  Alb  3.2<L>  /  TBili  0.2  /  DBili  x   /  AST  16  /  ALT  17  /  AlkPhos  79  10-05    PT/INR - ( 05 Oct 2019 20:26 )   PT: 10.7 sec;   INR: 0.97          PTT - ( 05 Oct 2019 20:26 )  PTT:27.3 sec  Urinalysis Basic - ( 05 Oct 2019 19:18 )    Color: Yellow / Appearance: Clear / SG: >=1.030 / pH: x  Gluc: x / Ketone: Trace mg/dL  / Bili: NEGATIVE / Urobili: 0.2 E.U./dL   Blood: x / Protein: Trace mg/dL / Nitrite: NEGATIVE   Leuk Esterase: NEGATIVE / RBC: Many /HPF / WBC < 5 /HPF   Sq Epi: x / Non Sq Epi: 5-10 /HPF / Bacteria: x        RADIOLOGY & ADDITIONAL STUDIES:    EXAM:  US TRANSVAGINAL                        EXAM:  US PELVIC COMPLETE                           PROCEDURE DATE:  10/05/2019          INTERPRETATION:  PELVIC ULTRASOUND dated 10/5/2019 7:42 PM    INDICATION: History of fibroid uterus. Anemia and vaginal bleeding.   AGE: 44 Beta-hCG: Not available LMP: Unknown    TECHNIQUE: Transabdominal views of the pelvis were obtained followed by   transvaginal views for better visualization of the ovaries.    PRIOR STUDIES: None    FINDINGS:   These images demonstrate the uterus to be retroverted and measuring 9 x 7   x 7 cm. Several uterine fibroids are noted, including a 3.6 x 3.0 x 4.4   cm intramural right fundal fibroid, a 2.4 x 2.0 x 2.6 cm subserosal left   uterine body fibroid, and a 1.5 x 1.0 x 3.0 cm posterior fundal fibroid.   A small nabothian cyst is evident. The endometrium is 0.3 cm in   thickness, which is normal. There is a 2.3 x 1.9 cm heterogeneous   avascular structure just to the right of the endometrium.    The right ovary is normal in size, measuring 3.0 x 2.0 x 1.4 cm with a   calculated volume of 4 mL. The left ovary is normal in size, measuring   3.0 x 2.0 x 1.5 cm with a calculated volume of 5 mL. No ovarian masses   are seen. Doppler evaluation demonstrates flow to both ovaries with no   evidence of torsion.    A physiologic amount of free fluid is seen in the cul-de-sac.      IMPRESSION:   1.  2.3 cm heterogeneous structure located just to the right of the   endometrium likely represents a submucosal fibroid andrather than an   endometrial lesion. Further evaluation with direct visualization or   pelvic MRI can be considered.  2.  Fibroid uterus as above.             Thank you for the opportunity to participate in the care of this patient.    KUN SANTACRUZ, RADIOLOGY RESIDENT  This document has been electronically signed.  TULIO MARTINEZ M.D., ATTENDING RADIOLOGIST  This document has been electronically signed.  Oct  5 2019  9:16PM

## 2019-10-06 NOTE — H&P ADULT - PROBLEM SELECTOR PLAN 4
Patient with history of KIANA secondary to blood loss from menorrhagia, currently hemodynamically stable  - plan as per problem 2

## 2019-10-06 NOTE — PATIENT PROFILE ADULT - HOME ACCESSIBILITY CONCERNS
Returned call to patient with no answer, voicemail message left will await return call. Reviewed last visit note on 11/20/2018. Pt was hospitalized at [facility] on 11/3/2018 for about 5 days for worsening depression and suicidal ideation with a planDuring this hospital course cariprazine was discontinued and she started on Lurasidone dosed up to 80 mg ( this dose with verified with [facility] at the time of the visit) .    none

## 2019-10-07 LAB
APTT BLD: 129.7 SEC — CRITICAL HIGH (ref 27.5–36.3)
APTT BLD: 95.1 SEC — HIGH (ref 27.5–36.3)
APTT BLD: 98.5 SEC — HIGH (ref 27.5–36.3)
HCT VFR BLD CALC: 29.6 % — LOW (ref 34.5–45)
HCT VFR BLD CALC: 30.1 % — LOW (ref 34.5–45)
HGB BLD-MCNC: 8.5 G/DL — LOW (ref 11.5–15.5)
HGB BLD-MCNC: 8.7 G/DL — LOW (ref 11.5–15.5)
MCHC RBC-ENTMCNC: 20.8 PG — LOW (ref 27–34)
MCHC RBC-ENTMCNC: 21 PG — LOW (ref 27–34)
MCHC RBC-ENTMCNC: 28.2 GM/DL — LOW (ref 32–36)
MCHC RBC-ENTMCNC: 29.4 GM/DL — LOW (ref 32–36)
MCV RBC AUTO: 71.5 FL — LOW (ref 80–100)
MCV RBC AUTO: 73.6 FL — LOW (ref 80–100)
NRBC # BLD: 0 /100 WBCS — SIGNIFICANT CHANGE UP (ref 0–0)
NRBC # BLD: 0 /100 WBCS — SIGNIFICANT CHANGE UP (ref 0–0)
PLATELET # BLD AUTO: 453 K/UL — HIGH (ref 150–400)
PLATELET # BLD AUTO: 458 K/UL — HIGH (ref 150–400)
RBC # BLD: 4.09 M/UL — SIGNIFICANT CHANGE UP (ref 3.8–5.2)
RBC # BLD: 4.14 M/UL — SIGNIFICANT CHANGE UP (ref 3.8–5.2)
RBC # FLD: 18.8 % — HIGH (ref 10.3–14.5)
RBC # FLD: 19.1 % — HIGH (ref 10.3–14.5)
WBC # BLD: 6.38 K/UL — SIGNIFICANT CHANGE UP (ref 3.8–10.5)
WBC # BLD: 6.64 K/UL — SIGNIFICANT CHANGE UP (ref 3.8–10.5)
WBC # FLD AUTO: 6.38 K/UL — SIGNIFICANT CHANGE UP (ref 3.8–10.5)
WBC # FLD AUTO: 6.64 K/UL — SIGNIFICANT CHANGE UP (ref 3.8–10.5)

## 2019-10-07 PROCEDURE — 99232 SBSQ HOSP IP/OBS MODERATE 35: CPT

## 2019-10-07 PROCEDURE — 93970 EXTREMITY STUDY: CPT | Mod: 26

## 2019-10-07 PROCEDURE — 93306 TTE W/DOPPLER COMPLETE: CPT | Mod: 26

## 2019-10-07 PROCEDURE — 99233 SBSQ HOSP IP/OBS HIGH 50: CPT | Mod: GC

## 2019-10-07 RX ORDER — ACETAMINOPHEN 500 MG
650 TABLET ORAL ONCE
Refills: 0 | Status: COMPLETED | OUTPATIENT
Start: 2019-10-07 | End: 2019-10-07

## 2019-10-07 RX ORDER — HEPARIN SODIUM 5000 [USP'U]/ML
1500 INJECTION INTRAVENOUS; SUBCUTANEOUS
Qty: 25000 | Refills: 0 | Status: DISCONTINUED | OUTPATIENT
Start: 2019-10-07 | End: 2019-10-08

## 2019-10-07 RX ADMIN — Medication 500 MILLIGRAM(S): at 23:11

## 2019-10-07 RX ADMIN — Medication 500 MILLIGRAM(S): at 14:47

## 2019-10-07 RX ADMIN — Medication 650 MILLIGRAM(S): at 19:23

## 2019-10-07 RX ADMIN — Medication 1 TABLET(S): at 12:40

## 2019-10-07 RX ADMIN — Medication 1 TABLET(S): at 23:11

## 2019-10-07 RX ADMIN — HEPARIN SODIUM 15 UNIT(S)/HR: 5000 INJECTION INTRAVENOUS; SUBCUTANEOUS at 05:20

## 2019-10-07 RX ADMIN — HEPARIN SODIUM 1800 UNIT(S)/HR: 5000 INJECTION INTRAVENOUS; SUBCUTANEOUS at 03:35

## 2019-10-07 RX ADMIN — HEPARIN SODIUM 15 UNIT(S)/HR: 5000 INJECTION INTRAVENOUS; SUBCUTANEOUS at 18:21

## 2019-10-07 RX ADMIN — Medication 650 MILLIGRAM(S): at 18:56

## 2019-10-07 RX ADMIN — Medication 500 MILLIGRAM(S): at 05:32

## 2019-10-07 NOTE — PROGRESS NOTE ADULT - PROBLEM SELECTOR PLAN 5
F- None  E-replete as needed  N- regular diet  C- FC  DVt ppx: hold in setting of active bleeding F- None  E- replete as needed  N- NPO at midnight for ablation  C- FC  DVt ppx: hold in setting of active bleeding

## 2019-10-07 NOTE — PROGRESS NOTE ADULT - SUBJECTIVE AND OBJECTIVE BOX
PULMONARY CONSULT FOLLOW-UP NOTE    INTERVAL HISTORY:   Reviewed chart and overnight events; patient seen and examined at bedside.  feels tired and drained.   denies SOB but has not walked much  Denies Vaginal bleeding while on Heparin.       MEDICATIONS:  Pulmonary:    Antimicrobials:  metroNIDAZOLE    Tablet 500 milliGRAM(s) Oral every 8 hours  trimethoprim  160 mG/sulfamethoxazole 800 mG 1 Tablet(s) Oral every 12 hours    Anticoagulants:  heparin  Infusion 1500 Unit(s)/Hr IV Continuous <Continuous>    Cardiac:      Allergies    penicillin (Anaphylaxis)    Intolerances        Vital Signs Last 24 Hrs  T(C): 36.8 (07 Oct 2019 05:36), Max: 36.8 (07 Oct 2019 05:36)  T(F): 98.2 (07 Oct 2019 05:36), Max: 98.2 (07 Oct 2019 05:36)  HR: 74 (07 Oct 2019 05:36) (74 - 80)  BP: 118/76 (07 Oct 2019 05:36) (118/76 - 126/82)  BP(mean): --  RR: 17 (07 Oct 2019 05:36) (14 - 17)  SpO2: 99% (07 Oct 2019 05:36) (97% - 100%)    10-06 @ 07:01  -  10-07 @ 07:00  --------------------------------------------------------  IN: 135 mL / OUT: 0 mL / NET: 135 mL          PHYSICAL EXAM:  Constitutional: WDWN  HEENT: NC/AT; PERRL, anicteric sclera; MMM  Neck: supple  Cardiovascular: +S1/S2, RRR  Respiratory: CTA B/L; no W/R/R  Gastrointestinal: soft, NT/ND; +BSx4  Extremities: bite mask right shin, mild swelling and tenderness.   Vascular: 2+ radial, DP/PT pulses B/L  Neurological: AAOx3; no focal deficits    LABS:      CBC Full  -  ( 07 Oct 2019 08:05 )  WBC Count : 6.64 K/uL  RBC Count : 4.09 M/uL  Hemoglobin : 8.5 g/dL  Hematocrit : 30.1 %  Platelet Count - Automated : 453 K/uL  Mean Cell Volume : 73.6 fl  Mean Cell Hemoglobin : 20.8 pg  Mean Cell Hemoglobin Concentration : 28.2 gm/dL  Auto Neutrophil # : x  Auto Lymphocyte # : x  Auto Monocyte # : x  Auto Eosinophil # : x  Auto Basophil # : x  Auto Neutrophil % : x  Auto Lymphocyte % : x  Auto Monocyte % : x  Auto Eosinophil % : x  Auto Basophil % : x    10-05    142  |  108  |  14  ----------------------------<  87  4.0   |  23  |  0.85    Ca    8.3<L>      05 Oct 2019 20:26    TPro  7.4  /  Alb  3.2<L>  /  TBili  0.2  /  DBili  x   /  AST  16  /  ALT  17  /  AlkPhos  79  10-05    PT/INR - ( 05 Oct 2019 20:26 )   PT: 10.7 sec;   INR: 0.97          PTT - ( 07 Oct 2019 03:17 )  PTT:129.7 sec      Urinalysis Basic - ( 05 Oct 2019 19:18 )    Color: Yellow / Appearance: Clear / SG: >=1.030 / pH: x  Gluc: x / Ketone: Trace mg/dL  / Bili: NEGATIVE / Urobili: 0.2 E.U./dL   Blood: x / Protein: Trace mg/dL / Nitrite: NEGATIVE   Leuk Esterase: NEGATIVE / RBC: Many /HPF / WBC < 5 /HPF   Sq Epi: x / Non Sq Epi: 5-10 /HPF / Bacteria: x      RADIOLOGY & ADDITIONAL STUDIES:  CT chest reviewed.

## 2019-10-07 NOTE — PROGRESS NOTE ADULT - PROBLEM SELECTOR PLAN 1
Patient with symptoms of shortness of breath and preceding LE swelling and trauma concerning for DVT, with confirmed PE (subsegmental and segmental) on CT imaging and no evidence of right heart strain. Patient also on OCPs for management of her menorrhagia which likely contributed to procoagulable state.   - given b/l and multiple PEs in addition to procoagulable state likely 2/2 OCP use, patient would benefit from AC, however, has risks of bleeding given active menorrhagia  - f/u Pulmonology recs; IVC filter versus anticoagulation   - monitor for chest pain, tachycardia, changes in vitals  - EKG currently NSR without signs of heart strain  - f/u dopplers LE  - PESI score 44, very low risk for inpatient mortality  - f/u echo for evaluation of heart strain Patient with symptoms of shortness of breath and preceding LE swelling and trauma concerning for DVT, with confirmed PE (subsegmental and segmental) on CT imaging and no evidence of right heart strain. Patient also on OCPs for management of her menorrhagia which likely contributed to procoagulable state.   - given b/l and multiple PEs in addition to procoagulable state likely 2/2 OCP use, patient would benefit from AC, however, has risks of bleeding given active menorrhagia  - f/u Pulmonology recs  - EKG currently NSR without signs of heart strain  - ECHO: No acute valvular disease or ventricular dysfunction  - Bilateral LE ultrasound: No thrombosis  - monitor for chest pain, tachycardia, changes in vitals  - Continue heparin anticoagulation, hold in AM for ablation

## 2019-10-07 NOTE — PROGRESS NOTE ADULT - PROBLEM SELECTOR PLAN 2
Patient with long history of menorrhagia and 1 month reported history of active bleeding. Currently severely anemic with need for transfusion.  - 1 U PRBC ordered  - patient initially refusing transfusion as concerned about HIV/AIDs as mother became HIV+ s/p transfusion many years ago, but explained that blood is screened for HIV/Hep C prior and risk is very low  - f/u post transfusion CBC  - maintain active T+S  - will not continue OCP given noncoagulable state  - f/u gyn consult Patient with long history of menorrhagia and 1 month reported history of active bleeding. Currently severely anemic with need for transfusion.  - s/p 2 U PRBC ordered, hemoglobin 7.5-> 9.3  - patient initially refusing transfusion as concerned about HIV/AIDs as mother became HIV+ s/p transfusion many years ago, but explained that blood is screened for HIV/Hep C prior and risk is very low  - maintain active T+S  - will not continue OCP given noncoagulable state  - Ablation tomorrow per gynecology  - NPO at midnight, EKG 10/5 NSR, ECHO: No valvular or ventricular dysfunction, hold heparin AC in morning 4-6 before procedure

## 2019-10-07 NOTE — PROGRESS NOTE ADULT - SUBJECTIVE AND OBJECTIVE BOX
*** NOTE IN PROGRESS ***    INTERVAL HPI/OVERNIGHT EVENTS:    SUBJECTIVE: Patient seen and examined at bedside.    OBJECTIVE:    VITAL SIGNS:  ICU Vital Signs Last 24 Hrs  T(C): 36.8 (07 Oct 2019 05:36), Max: 36.8 (07 Oct 2019 05:36)  T(F): 98.2 (07 Oct 2019 05:36), Max: 98.2 (07 Oct 2019 05:36)  HR: 74 (07 Oct 2019 05:36) (74 - 81)  BP: 118/76 (07 Oct 2019 05:36) (98/56 - 126/82)  BP(mean): --  ABP: --  ABP(mean): --  RR: 17 (07 Oct 2019 05:36) (14 - 17)  SpO2: 99% (07 Oct 2019 05:36) (97% - 100%)        10-06 @ 07:01  -  10-07 @ 07:00  --------------------------------------------------------  IN: 135 mL / OUT: 0 mL / NET: 135 mL      CAPILLARY BLOOD GLUCOSE          PHYSICAL EXAM:    General: NAD  HEENT: NC/AT; PERRL, clear conjunctiva  Neck: supple  Respiratory: CTA b/l  Cardiovascular: +S1/S2; RRR  Abdomen: soft, NT/ND; +BS x4  Extremities: WWP, 2+ peripheral pulses b/l; no LE edema  Skin: normal color and turgor; no rash  Neurological:     MEDICATIONS:  MEDICATIONS  (STANDING):  heparin  Infusion 1500 Unit(s)/Hr (15 mL/Hr) IV Continuous <Continuous>  influenza   Vaccine 0.5 milliLiter(s) IntraMuscular once  metroNIDAZOLE    Tablet 500 milliGRAM(s) Oral every 8 hours  trimethoprim  160 mG/sulfamethoxazole 800 mG 1 Tablet(s) Oral every 12 hours    MEDICATIONS  (PRN):      ALLERGIES:  Allergies    penicillin (Anaphylaxis)    Intolerances        LABS:                        8.5    6.64  )-----------( 453      ( 07 Oct 2019 08:05 )             30.1     10-05    142  |  108  |  14  ----------------------------<  87  4.0   |  23  |  0.85    Ca    8.3<L>      05 Oct 2019 20:26    TPro  7.4  /  Alb  3.2<L>  /  TBili  0.2  /  DBili  x   /  AST  16  /  ALT  17  /  AlkPhos  79  10-05    PT/INR - ( 05 Oct 2019 20:26 )   PT: 10.7 sec;   INR: 0.97          PTT - ( 07 Oct 2019 03:17 )  PTT:129.7 sec  Urinalysis Basic - ( 05 Oct 2019 19:18 )    Color: Yellow / Appearance: Clear / SG: >=1.030 / pH: x  Gluc: x / Ketone: Trace mg/dL  / Bili: NEGATIVE / Urobili: 0.2 E.U./dL   Blood: x / Protein: Trace mg/dL / Nitrite: NEGATIVE   Leuk Esterase: NEGATIVE / RBC: Many /HPF / WBC < 5 /HPF   Sq Epi: x / Non Sq Epi: 5-10 /HPF / Bacteria: x        RADIOLOGY & ADDITIONAL TESTS: Reviewed. INTERVAL HPI/OVERNIGHT EVENTS: No acute events overnight. , adjusted by night team.    SUBJECTIVE: Patient seen and examined at bedside. Breathing improved today, still nervous about the blood clot. Denies fever, headache, nausea, vomiting, chest pain, shortness of breath, abdominal pain, changes in bowel movements or urination.     OBJECTIVE:    VITAL SIGNS:  ICU Vital Signs Last 24 Hrs  T(C): 36.8 (07 Oct 2019 05:36), Max: 36.8 (07 Oct 2019 05:36)  T(F): 98.2 (07 Oct 2019 05:36), Max: 98.2 (07 Oct 2019 05:36)  HR: 74 (07 Oct 2019 05:36) (74 - 81)  BP: 118/76 (07 Oct 2019 05:36) (98/56 - 126/82)  BP(mean): --  ABP: --  ABP(mean): --  RR: 17 (07 Oct 2019 05:36) (14 - 17)  SpO2: 99% (07 Oct 2019 05:36) (97% - 100%)        10-06 @ 07:01  -  10-07 @ 07:00  --------------------------------------------------------  IN: 135 mL / OUT: 0 mL / NET: 135 mL      PHYSICAL EXAM:    Constitutional: NAD, resting comfortably in bed, answering questions  	HEENT: NC/AT, PERRL, EOMI, anicteric sclera, conjunctival pallor, no nasal discharge; uvula midline, no oropharyngeal erythema or exudates; MMM, neck supple; no JVD or thyromegaly  	Respiratory: CTA B/L; no W/R/R, no retractions  	Cardiac: +S1/S2; RRR; mid systolic click  	Gastrointestinal: soft, NT/ND; no rebound or guarding; +BSx4  	Back: spine midline, no bony tenderness or step-offs; no CVAT B/L  	Extremities: WWP, no clubbing or cyanosis; no peripheral edema; 2 puncture wounds on the anterior aspect of the R shin 3 cm above the ankle with 2-3 mm of surrounding erythema and minimal tenderness without discharge, purulence or fluctuance  	Musculoskeletal: NROM x4; no joint swelling, tenderness or erythema  	Vascular: 2+ radial, femoral, DP/PT pulses B/L  	Dermatologic: skin warm, dry and intact; no rashes, wounds, or scars  	Lymphatic: no submandibular or cervical LAD  	Neurologic: AAOx3; CNII-XII grossly intact; no focal deficits       MEDICATIONS:  MEDICATIONS  (STANDING):  heparin  Infusion 1500 Unit(s)/Hr (15 mL/Hr) IV Continuous <Continuous>  influenza   Vaccine 0.5 milliLiter(s) IntraMuscular once  metroNIDAZOLE    Tablet 500 milliGRAM(s) Oral every 8 hours  trimethoprim  160 mG/sulfamethoxazole 800 mG 1 Tablet(s) Oral every 12 hours    MEDICATIONS  (PRN):      ALLERGIES:  Allergies    penicillin (Anaphylaxis)    Intolerances        LABS:                        8.5    6.64  )-----------( 453      ( 07 Oct 2019 08:05 )             30.1     10-05    142  |  108  |  14  ----------------------------<  87  4.0   |  23  |  0.85    Ca    8.3<L>      05 Oct 2019 20:26    TPro  7.4  /  Alb  3.2<L>  /  TBili  0.2  /  DBili  x   /  AST  16  /  ALT  17  /  AlkPhos  79  10-05    PT/INR - ( 05 Oct 2019 20:26 )   PT: 10.7 sec;   INR: 0.97          PTT - ( 07 Oct 2019 03:17 )  PTT:129.7 sec  Urinalysis Basic - ( 05 Oct 2019 19:18 )    Color: Yellow / Appearance: Clear / SG: >=1.030 / pH: x  Gluc: x / Ketone: Trace mg/dL  / Bili: NEGATIVE / Urobili: 0.2 E.U./dL   Blood: x / Protein: Trace mg/dL / Nitrite: NEGATIVE   Leuk Esterase: NEGATIVE / RBC: Many /HPF / WBC < 5 /HPF   Sq Epi: x / Non Sq Epi: 5-10 /HPF / Bacteria: x        RADIOLOGY & ADDITIONAL TESTS: Reviewed.

## 2019-10-07 NOTE — PROGRESS NOTE ADULT - ASSESSMENT
45 yo F with PMH of fibroids and MVP who presents with complaints of shortness of breath and RLE pain and swelling secondary to a cat bite, with confirmed PE on CT imaging and concerns for lower extremity cellulitis.

## 2019-10-07 NOTE — PROGRESS NOTE ADULT - SUBJECTIVE AND OBJECTIVE BOX
Pt evaluated at bedside for PM rounds, resting comfortably in bed. Pt says that she is still experiencing vaginal bleeding but says that it "is not as bad as it has been". She endorses saturating 1 pad every 2-3 hours. She subjectively feels better overall after 2 units PRBCs.     ObHx:  G1 Previable PTD @20wks   G2 PTD at 27wks due to cervical insufficiency c/b PPH (though did not require blood transfusion)  G3  at term   G4  at term  G5 VTOP D&C  GynHx: EMB and normal pap smear  (per patient), fibroids  PMH: anemia, mitral valve prolapse  PSH: hysteroscopic myomectomy (3 submucosal fibroids removed) ; surgery for broken toe  Allergic to PCN (anaphylaxis)  Meds: Ethindrone acetate (Taytulla) since     Vital Signs Last 24 Hrs  T(C): 36.3 (06 Oct 2019 10:08), Max: 37 (05 Oct 2019 21:17)  T(F): 97.4 (06 Oct 2019 10:08), Max: 98.6 (05 Oct 2019 21:17)  HR: 81 (06 Oct 2019 10:08) (81 - 104)  BP: 98/56 (06 Oct 2019 10:08) (98/56 - 132/80)  BP(mean): --  RR: 15 (06 Oct 2019 10:08) (15 - 18)  SpO2: 100% (06 Oct 2019 10:08) (97% - 100%)    Physical Exam:  Gen: NAD, appears tired  GI: soft, nontender, nondistended, obese abdomen, no rebound no guarding  BME: retroverted 11 week size uterus, posterior fibroid appreciated on exam, no adnexal masses appreciated, no cervical motion tenderness   Spec: cervical os visually closed, 10cc dark red blood clot in vaginal vault, minimal bright red staining, no abnormal discharge  Ext: LE edema bilaterally though right worse than left, 2mm puncture wound on R anterior shin 3cm above the ankle with minimal surrounding erythema     LABS:                        6.7    6.4   )-----------( 430      ( 06 Oct 2019 01:11 )             23.9     10-05    142  |  108  |  14  ----------------------------<  87  4.0   |  23  |  0.85    Ca    8.3<L>      05 Oct 2019 20:26    TPro  7.4  /  Alb  3.2<L>  /  TBili  0.2  /  DBili  x   /  AST  16  /  ALT  17  /  AlkPhos  79  10-05    PT/INR - ( 05 Oct 2019 20:26 )   PT: 10.7 sec;   INR: 0.97          PTT - ( 05 Oct 2019 20:26 )  PTT:27.3 sec  Urinalysis Basic - ( 05 Oct 2019 19:18 )    Color: Yellow / Appearance: Clear / SG: >=1.030 / pH: x  Gluc: x / Ketone: Trace mg/dL  / Bili: NEGATIVE / Urobili: 0.2 E.U./dL   Blood: x / Protein: Trace mg/dL / Nitrite: NEGATIVE   Leuk Esterase: NEGATIVE / RBC: Many /HPF / WBC < 5 /HPF   Sq Epi: x / Non Sq Epi: 5-10 /HPF / Bacteria: x        RADIOLOGY & ADDITIONAL STUDIES:    EXAM:  US TRANSVAGINAL                        EXAM:  US PELVIC COMPLETE                           PROCEDURE DATE:  10/05/2019          INTERPRETATION:  PELVIC ULTRASOUND dated 10/5/2019 7:42 PM    INDICATION: History of fibroid uterus. Anemia and vaginal bleeding.   AGE: 44 Beta-hCG: Not available LMP: Unknown    TECHNIQUE: Transabdominal views of the pelvis were obtained followed by   transvaginal views for better visualization of the ovaries.    PRIOR STUDIES: None    FINDINGS:   These images demonstrate the uterus to be retroverted and measuring 9 x 7   x 7 cm. Several uterine fibroids are noted, including a 3.6 x 3.0 x 4.4   cm intramural right fundal fibroid, a 2.4 x 2.0 x 2.6 cm subserosal left   uterine body fibroid, and a 1.5 x 1.0 x 3.0 cm posterior fundal fibroid.   A small nabothian cyst is evident. The endometrium is 0.3 cm in   thickness, which is normal. There is a 2.3 x 1.9 cm heterogeneous   avascular structure just to the right of the endometrium.    The right ovary is normal in size, measuring 3.0 x 2.0 x 1.4 cm with a   calculated volume of 4 mL. The left ovary is normal in size, measuring   3.0 x 2.0 x 1.5 cm with a calculated volume of 5 mL. No ovarian masses   are seen. Doppler evaluation demonstrates flow to both ovaries with no   evidence of torsion.    A physiologic amount of free fluid is seen in the cul-de-sac.      IMPRESSION:   1.  2.3 cm heterogeneous structure located just to the right of the   endometrium likely represents a submucosal fibroid andrather than an   endometrial lesion. Further evaluation with direct visualization or   pelvic MRI can be considered.  2.  Fibroid uterus as above.             Thank you for the opportunity to participate in the care of this patient.    KUN SANTACRUZ, RADIOLOGY RESIDENT  This document has been electronically signed.  TULIO MARTINEZ M.D., ATTENDING RADIOLOGIST  This document has been electronically signed.  Oct  5 2019  9:16PM Pt evaluated at bedside for PM rounds, resting comfortably in bed. Pt says that she is still experiencing vaginal bleeding but says that it "is not as bad as it has been". She endorses saturating 1 pad every 2-3 hours. She subjectively feels better overall after 2 units PRBCs. SOB much improved, noticed only when she gets up to ambulate. Denies N/V, palpitations, dizziness.    Vital Signs Last 24 Hrs  T(C): 36.7 (07 Oct 2019 14:50), Max: 36.8 (07 Oct 2019 05:36)  T(F): 98 (07 Oct 2019 14:50), Max: 98.2 (07 Oct 2019 05:36)  HR: 80 (07 Oct 2019 14:50) (74 - 80)  BP: 108/65 (07 Oct 2019 14:50) (108/65 - 122/74)  BP(mean): --  RR: 16 (07 Oct 2019 14:50) (15 - 17)  SpO2: 100% (07 Oct 2019 14:50) (97% - 100%)    GENERAL: No acute distress, well-developed  HEAD:  Atraumatic, Normocephalic  ENT: PERRL, conjunctiva and sclera clear  CHEST/LUNG: Clear to auscultation bilaterally; No wheeze, equal breath sounds bilaterally, respirations nonlabored  HEART: Regular rate and rhythm; No murmurs, rubs, or gallops  ABDOMEN: Soft, nontender, nondistended; Bowel sounds present, no organomegaly  EXTREMITIES:  moves in all directions  PSYCH: Nl behavior, nl affect       LABS:                                   8.5    6.64  )-----------( 453      ( 07 Oct 2019 08:05 )             30.1     Hemoglobin: 8.5 g/dL (10-07 @ 08:05)  Hemoglobin: 9.3 g/dL (10-06 @ 20:30)  Hemoglobin: 7.5 g/dL (10-06 @ 12:20)  Hemoglobin: 6.7 g/dL (10-06 @ 01:11)  Hemoglobin: 7.2 g/dL (10-05 @ 20:26)    CBC Full  -  ( 07 Oct 2019 08:05 )  WBC Count : 6.64 K/uL  RBC Count : 4.09 M/uL  Hemoglobin : 8.5 g/dL  Hematocrit : 30.1 %  Platelet Count - Automated : 453 K/uL  Mean Cell Volume : 73.6 fl  Mean Cell Hemoglobin : 20.8 pg  Mean Cell Hemoglobin Concentration : 28.2 gm/dL      10-05    142  |  108  |  14  ----------------------------<  87  4.0   |  23  |  0.85    Ca    8.3<L>      05 Oct 2019 20:26    TPro  7.4  /  Alb  3.2<L>  /  TBili  0.2  /  DBili  x   /  AST  16  /  ALT  17  /  AlkPhos  79  10-05    Creatinine Trend: 0.85<--  LIVER FUNCTIONS - ( 05 Oct 2019 20:26 )  Alb: 3.2 g/dL / Pro: 7.4 g/dL / ALK PHOS: 79 U/L / ALT: 17 U/L / AST: 16 U/L / GGT: x           PT/INR - ( 05 Oct 2019 20:26 )   PT: 10.7 sec;   INR: 0.97          PTT - ( 07 Oct 2019 03:17 )  PTT:129.7 sec  CARDIAC MARKERS ( 05 Oct 2019 20:26 )  <0.017 ng/mL / x     / 107 U/L / x     / x                  Urinalysis Basic - ( 05 Oct 2019 19:18 )    Color: Yellow / Appearance: Clear / SG: >=1.030 / pH: x  Gluc: x / Ketone: Trace mg/dL  / Bili: NEGATIVE / Urobili: 0.2 E.U./dL   Blood: x / Protein: Trace mg/dL / Nitrite: NEGATIVE   Leuk Esterase: NEGATIVE / RBC: Many /HPF / WBC < 5 /HPF   Sq Epi: x / Non Sq Epi: 5-10 /HPF / Bacteria: x      IMAGING:    < from: US Duplex Venous Lower Ext Complete, Bilateral (10.07.19 @ 10:48) >  EXAM:  US DPLX LWR EXT VEINS COMPL BI                          PROCEDURE DATE:  10/07/2019          INTERPRETATION:    VENOUS DUPLEX DOPPLER OF BOTH LOWER EXTREMITIES dated 10/7/2019 10:48 AM    INDICATION: Shortness of breath for 2 weeks, pulmonary emboli. R/o lower   extremity vein DVT as source of pulmonary emboli.    TECHNIQUE: Duplex Doppler evaluation including gray-scale ultrasound   imaging, color flow Doppler imaging, and Doppler spectral analysis of the   veins of both lower extremities was performed.     COMPARISON: Ultrasound of the veins of the left lower extremity from   2015.    FINDINGS:    Thigh veins: The common femoral, femoral, popliteal, proximal greater   saphenous, and proximal deep femoral veins are patent and free of   thrombus bilaterally. The veins are normally compressible and have normal   phasic flow and augmentation response.    Calf veins: The paired peroneal and posterior tibial calf veins are   patent bilaterally.      IMPRESSION:  No vein thrombosis seen.    < end of copied text >      < from: Echocardiogram (10.07.19 @ 11:19) >  EXAM:  ECHOCARDIOGRAM (CARDIOL)                          PROCEDURE DATE:  10/07/2019          INTERPRETATION:  REPORT:    -----------------------------------  TRANSTHORACIC ECHOCARDIOGRAM REPORT       ---------------------------------------------------------------------------  -----  Patient Name:   LAKHWINDER RAMOS Date of Exam:       10/7/2019  Medical Rec #:  [id number]       Height:             66.9 in  Account #:      [id number]       Weight:             213.8 lb  YOB: 1975      BSA:               2.08 m²  Patient Age:    44 years      BP:                 125/60 mmHg  Patient Gender: F             Sonographer:        Marcia Christy                                Refering Physician: RIANNA MARQUEZ       CPT:            ECHO TTE [facility] COMP - 78564; - [id number].  Indication(s):  I26.99 - Other pulmonary embolism without acute cor   pulmonale  Procedure:      A complete two-dimensional transthoracic echocardiogram   was                  performed: 2D, M-mode, spectral and color flow Doppler.     Study Quality:  Study quality was good.  Study Comments: PULMONARY EMBOLISM                  [id number]       ---------------------------------------------------------------------------  -----  CONCLUSIONS:     1. Normal left and right ventricular size and systolic function.   2. No significant valvular disease.   3. No pericardial effusion.    ---------------------------------------------------------------------------  -----  2D AND M-MODE MEASUREMENTS (normal ranges within parentheses):     Left Ventricle:          Normal  IVSd (2D):      0.82 cm (0.7-1.1)  LVPWd (2D):     0.85 cm (0.7-1.1)  LVIDd (2D):     5.61 cm (3.4-5.7)  LVIDs (2D):     3.95 cm  LV FS (2D):     29.6 %   (>25%)  LV EF (MOD BP):  54 %    (>55%)    LV DIASTOLIC FUNCTION:     MV Peak E: 78.00 cm/s MV e' lat: 6.8 cm/s MV E/e' lat ratio: 11.4  MV Peak A: 83.40 cm/s MV e' med: 6.6 cm/s MV E/e' med ratio: 11.9  E/A Ratio: 0.94       MV e' av.7 cm/s Decel Time:        124 msec    SPECTRAL DOPPLER ANALYSIS:     Mitral Valve:  MV Max Howard:   MV P1/2 Time: 35.96 msec  MV Mean Grad: MV Area, PHT: 6.12 cm²       Aortic Valve: AoV Max Howard:             AoV Peak PG:            AoV Mean   P.0 mmHg  LVOT Vmax:      0.81 m/s LVOT VTI:      0.173 m  LVOT Diameter: 2.10 cm  AoV Area, VMax:          AoV Area, VTI: 2.94 cm² AoV Area, Vmn: 2.70 cm²    Tricuspid Valve and PA/RV Systolic Pressure: TR Max Velocity: 2.86 m/s RA   Pressure: 3 mmHg RVSP/PASP: 35.7 mmHg       ---------------------------------------------------------------------------  -----  FINDINGS:     Left Ventricle:  The left ventricle is normal in size, wall thickness, and systolic   function with a calculated ejection fraction of 54.0 %. Probably normal   left ventricular wall motion. There is normal left ventricular diastolic   function and filling pressure.     Right Ventricle:  The right ventricle is normal in size and systolic function.     Left Atrium:  The left atrium is normal in size.     Right Atrium:  The right atrium is normal in size.     Aortic Valve:  The aortic valve is tricuspid with normal structure and function without   stenosis. There is no aortic regurgitation.     Pulmonic Valve:  Structurally normal pulmonic valve with normal leaflet excursion. There   is trace pulmonic regurgitation.     Mitral Valve:  Structurally normal mitral valve with normal leaflet excursion. There is   trace mitral regurgitation.     Tricuspid Valve:  Structurally normal tricuspid valve with normal leaflet excursion. There   is mild tricuspid regurgitation. Pulmonary artery systolic pressure   (estimated using the tricuspid regurgitant gradient and an estimate of   right atrial pressure) is 35.7 mmHg.     Aorta:  The aortic root is normal in size and structure.     Venous:  The inferior vena cava is normal in size (<2.1cm) with normal inspiratory   collapse (>50%) consistent with normal right atrial pressure (  3, range 0-5mmHg).     Pericardium:  No pericardial effusion is seen.     Dr. Monica Finney MD.    Electronically signed by Dr. Monica Finney MD.  Signature Date/Time: 10/7/2019/1:57:53 PM         *** Final ***                  "Thank you for the opportunity to participate in the care of this   patient."        MONICA FINNEY   This document has been electronically signed. Oct  7 2019 11:19AM                  < end of copied text >

## 2019-10-07 NOTE — PROGRESS NOTE ADULT - ASSESSMENT
The patient is a 44 yr old female with hx of intermittent bleeding from uterine fibroids and anemia, Mitral valve prolapse, cat bites on leg who presents to the hospital for evaluation of dyspnea on exertion.      Problem/Recommendation - 1:  Problem: Pulmonary embolism. Recommendation: Right segmental and subsegmental PE provoked by OCP use.   Very low risk PE with PESI score 44. Trop negative, . No evidence of right heart strain on CT scan  Recommendations:   - on heparin, denies vaginal bleeding this AM. Hx of menorrhagia due to multiple uterine fibroids ( prior hysteroscopic removal 2017)   - ECHO and DVT studies pending.    - Encourage early ambulation with PT.  - Check Ambulatory saturations.        Problem/Recommendation - 2:  ·  Problem: Menorrhagia.  Recommendation: vaginal bleeding from multiple uterine fibroids. Monitor Hb closely and quantify vaginal bleed as number of pads soaked. If significant increase/ hemodynamic instability, may have to hold heparin drip and be considered for IVC filter.  - Plan for outpatient endometrial ablation. The patient is a 44 yr old female with hx of intermittent bleeding from uterine fibroids and anemia, Mitral valve prolapse, cat bites on leg who presents to the hospital for evaluation of dyspnea on exertion.      Problem/Recommendation - 1:  Problem: Pulmonary embolism. Recommendation: Right segmental and subsegmental PE provoked by OCP use.   Very low risk PE with PESI score 44. Trop negative, . No evidence of right heart strain on CT scan  Recommendations:   - on heparin, denies vaginal bleeding this AM. Hx of menorrhagia due to multiple uterine fibroids ( prior hysteroscopic removal 2017)   - ECHO shows no Right heart strain.   - DVT scan negative.   - Encourage early ambulation with PT.  - Check Ambulatory saturations.  - Low risk of pulmonary complications ARISCAT ( 1.8 %).  - Minimize time off of the anticoagulation if ablation is happening tomorrow.          Problem/Recommendation - 2:  ·  Problem: Menorrhagia.  Recommendation: vaginal bleeding from multiple uterine fibroids. Monitor Hb closely and quantify vaginal bleed as number of pads soaked. If significant increase/ hemodynamic instability, may have to hold heparin drip and be considered for IVC filter.  - Plan for endometrial ablation inpatient now.  - follow Gyn recs.   - Low risk of pulmonary complications ARISCAT ( 1.8 %).  - Minimize time off of the anticoagulation if ablation is happening tomorrow.

## 2019-10-07 NOTE — PROGRESS NOTE ADULT - ASSESSMENT
44y  with LMP  started on OCPs at that time with h/o known fibroids s/p hysteroscopic myomectomy in 2017 who was admitted to Medicine for symptomatic anemia requiring blood transfusion in the setting of abnormal vaginal bleeding, found to have pulmonary emboli on admission. Gyn was consulted for persistent vaginal bleeding x1 month with dizziness, shortness of breath, and Hb 6.7.     #Abnormal uterine bleeding, symptomatic anemia  - Multiple small fibroids appreciated on transvaginal ultrasound likely the source of her abnormal vaginal bleeding.   - Medical management of acute abnormal uterine bleeding includes provera, combined OCPs, and lysteda though all are contraindicated in a patient with PE.   - Patient counseled on surgical management options which include D&C, endometrial ablation, uterine artery embolization, and hysterectomy. She had been planning on an endometrial ablation prior to this episode and would like to have that done.   - Discussed with patient that she will need an endometrial biopsy prior to that procedure and also must first be medically optimized.   - No acute gyn intervention recommended at this time given she is not a candidate for acute medical or surgical management. Recommend surgical management out-patient once patient has been medically optimized/cleared.  - Blood transfusion  - Obtain urine pregnancy test.    Patient seen by Darlyn Ortega PGY2  Plan discussed with Dr Ruiz 44y  with LMP  started on OCPs at that time with h/o known fibroids s/p hysteroscopic myomectomy in 2017 who was admitted to Medicine for symptomatic anemia requiring blood transfusion in the setting of abnormal vaginal bleeding, found to have pulmonary emboli on admission. Gyn was consulted for persistent vaginal bleeding x1 month with dizziness, shortness of breath, and Hb 6.7.       [ ] c/w heparin drip until therapeutic  [ ] Plan for ablation after medical optimization  #Abnormal uterine bleeding, symptomatic anemia  - Multiple small fibroids appreciated on transvaginal ultrasound likely the source of her abnormal vaginal bleeding.   - Medical management of acute abnormal uterine bleeding includes provera, combined OCPs, and lysteda though all are contraindicated in a patient with PE.   - Patient counseled on surgical management options which include D&C, endometrial ablation, uterine artery embolization, and hysterectomy. She had been planning on an endometrial ablation prior to this episode and would like to have that done.   - Discussed with patient that she will need an endometrial biopsy prior to that procedure and also must first be medically optimized.   - No acute gyn intervention recommended at this time given she is not a candidate for acute medical or surgical management. Recommend surgical management out-patient once patient has been medically optimized/cleared.  - Blood transfusion  - Obtain urine pregnancy test.    Patient seen by Darlyn Ortega PGY2  Plan discussed with Dr Ruiz 44y  with LMP  started on OCPs at that time with h/o known fibroids s/p hysteroscopic myomectomy in 2017 who was admitted to Medicine for symptomatic anemia requiring blood transfusion in the setting of abnormal vaginal bleeding, found to have pulmonary emboli on admission. Gyn was consulted for persistent vaginal bleeding x1 month with dizziness, shortness of breath, and Hb 6.7.     [ ] c/w heparin drip until therapeutic  [ ] Plan for hysteroscopic ablation with d&c tomorrow after medical clearance  [ ] NPO at midnight

## 2019-10-07 NOTE — PROGRESS NOTE ADULT - PROBLEM SELECTOR PLAN 3
Patient with suspected cellulitis of lower extremity s/p cat bite without any purulence, edema or fluctuance.   - patient without leukocytosis, fever, or hemodynamic instability  - will c/w bactrim and flagyl for total 5 day course Patient with suspected cellulitis of lower extremity s/p cat bite without any purulence, edema or fluctuance.   - patient without leukocytosis, fever, or hemodynamic instability  - will c/w bactrim and flagyl for total 5 day course, day 3 of 5.

## 2019-10-08 ENCOUNTER — RESULT REVIEW (OUTPATIENT)
Age: 44
End: 2019-10-08

## 2019-10-08 LAB
ANION GAP SERPL CALC-SCNC: 12 MMOL/L — SIGNIFICANT CHANGE UP (ref 5–17)
APTT BLD: 86.8 SEC — HIGH (ref 27.5–36.3)
BASOPHILS # BLD AUTO: 0.06 K/UL — SIGNIFICANT CHANGE UP (ref 0–0.2)
BASOPHILS NFR BLD AUTO: 0.9 % — SIGNIFICANT CHANGE UP (ref 0–2)
BLD GP AB SCN SERPL QL: NEGATIVE — SIGNIFICANT CHANGE UP
BUN SERPL-MCNC: 12 MG/DL — SIGNIFICANT CHANGE UP (ref 7–23)
CALCIUM SERPL-MCNC: 8.6 MG/DL — SIGNIFICANT CHANGE UP (ref 8.4–10.5)
CHLORIDE SERPL-SCNC: 105 MMOL/L — SIGNIFICANT CHANGE UP (ref 96–108)
CO2 SERPL-SCNC: 21 MMOL/L — LOW (ref 22–31)
CREAT SERPL-MCNC: 1 MG/DL — SIGNIFICANT CHANGE UP (ref 0.5–1.3)
EOSINOPHIL # BLD AUTO: 0.44 K/UL — SIGNIFICANT CHANGE UP (ref 0–0.5)
EOSINOPHIL NFR BLD AUTO: 6.9 % — HIGH (ref 0–6)
GLUCOSE SERPL-MCNC: 97 MG/DL — SIGNIFICANT CHANGE UP (ref 70–99)
HCG UR QL: NEGATIVE — SIGNIFICANT CHANGE UP
HCT VFR BLD CALC: 31.2 % — LOW (ref 34.5–45)
HGB BLD-MCNC: 9 G/DL — LOW (ref 11.5–15.5)
IMM GRANULOCYTES NFR BLD AUTO: 0.3 % — SIGNIFICANT CHANGE UP (ref 0–1.5)
LYMPHOCYTES # BLD AUTO: 2.52 K/UL — SIGNIFICANT CHANGE UP (ref 1–3.3)
LYMPHOCYTES # BLD AUTO: 39.7 % — SIGNIFICANT CHANGE UP (ref 13–44)
MAGNESIUM SERPL-MCNC: 1.9 MG/DL — SIGNIFICANT CHANGE UP (ref 1.6–2.6)
MCHC RBC-ENTMCNC: 20.9 PG — LOW (ref 27–34)
MCHC RBC-ENTMCNC: 28.8 GM/DL — LOW (ref 32–36)
MCV RBC AUTO: 72.6 FL — LOW (ref 80–100)
MONOCYTES # BLD AUTO: 0.33 K/UL — SIGNIFICANT CHANGE UP (ref 0–0.9)
MONOCYTES NFR BLD AUTO: 5.2 % — SIGNIFICANT CHANGE UP (ref 2–14)
NEUTROPHILS # BLD AUTO: 2.97 K/UL — SIGNIFICANT CHANGE UP (ref 1.8–7.4)
NEUTROPHILS NFR BLD AUTO: 47 % — SIGNIFICANT CHANGE UP (ref 43–77)
NRBC # BLD: 0 /100 WBCS — SIGNIFICANT CHANGE UP (ref 0–0)
PLATELET # BLD AUTO: 439 K/UL — HIGH (ref 150–400)
POTASSIUM SERPL-MCNC: 4.1 MMOL/L — SIGNIFICANT CHANGE UP (ref 3.5–5.3)
POTASSIUM SERPL-SCNC: 4.1 MMOL/L — SIGNIFICANT CHANGE UP (ref 3.5–5.3)
RBC # BLD: 4.3 M/UL — SIGNIFICANT CHANGE UP (ref 3.8–5.2)
RBC # FLD: 19.1 % — HIGH (ref 10.3–14.5)
RH IG SCN BLD-IMP: POSITIVE — SIGNIFICANT CHANGE UP
SODIUM SERPL-SCNC: 138 MMOL/L — SIGNIFICANT CHANGE UP (ref 135–145)
WBC # BLD: 6.34 K/UL — SIGNIFICANT CHANGE UP (ref 3.8–10.5)
WBC # FLD AUTO: 6.34 K/UL — SIGNIFICANT CHANGE UP (ref 3.8–10.5)

## 2019-10-08 PROCEDURE — 99233 SBSQ HOSP IP/OBS HIGH 50: CPT

## 2019-10-08 RX ORDER — APIXABAN 2.5 MG/1
5 TABLET, FILM COATED ORAL EVERY 12 HOURS
Refills: 0 | Status: DISCONTINUED | OUTPATIENT
Start: 2019-10-08 | End: 2019-10-08

## 2019-10-08 RX ORDER — HYDROMORPHONE HYDROCHLORIDE 2 MG/ML
0.5 INJECTION INTRAMUSCULAR; INTRAVENOUS; SUBCUTANEOUS
Refills: 0 | Status: DISCONTINUED | OUTPATIENT
Start: 2019-10-08 | End: 2019-10-08

## 2019-10-08 RX ORDER — ACETAMINOPHEN 500 MG
650 TABLET ORAL ONCE
Refills: 0 | Status: COMPLETED | OUTPATIENT
Start: 2019-10-08 | End: 2019-10-08

## 2019-10-08 RX ORDER — SODIUM CHLORIDE 9 MG/ML
1000 INJECTION, SOLUTION INTRAVENOUS
Refills: 0 | Status: DISCONTINUED | OUTPATIENT
Start: 2019-10-08 | End: 2019-10-08

## 2019-10-08 RX ORDER — APIXABAN 2.5 MG/1
10 TABLET, FILM COATED ORAL EVERY 12 HOURS
Refills: 0 | Status: DISCONTINUED | OUTPATIENT
Start: 2019-10-08 | End: 2019-10-09

## 2019-10-08 RX ORDER — ONDANSETRON 8 MG/1
4 TABLET, FILM COATED ORAL ONCE
Refills: 0 | Status: DISCONTINUED | OUTPATIENT
Start: 2019-10-08 | End: 2019-10-10

## 2019-10-08 RX ORDER — METOCLOPRAMIDE HCL 10 MG
10 TABLET ORAL EVERY 6 HOURS
Refills: 0 | Status: DISCONTINUED | OUTPATIENT
Start: 2019-10-08 | End: 2019-10-10

## 2019-10-08 RX ADMIN — Medication 500 MILLIGRAM(S): at 07:22

## 2019-10-08 RX ADMIN — HYDROMORPHONE HYDROCHLORIDE 0.5 MILLIGRAM(S): 2 INJECTION INTRAMUSCULAR; INTRAVENOUS; SUBCUTANEOUS at 17:31

## 2019-10-08 RX ADMIN — HYDROMORPHONE HYDROCHLORIDE 0.5 MILLIGRAM(S): 2 INJECTION INTRAMUSCULAR; INTRAVENOUS; SUBCUTANEOUS at 16:58

## 2019-10-08 RX ADMIN — Medication 500 MILLIGRAM(S): at 23:01

## 2019-10-08 RX ADMIN — Medication 650 MILLIGRAM(S): at 08:10

## 2019-10-08 RX ADMIN — HYDROMORPHONE HYDROCHLORIDE 0.5 MILLIGRAM(S): 2 INJECTION INTRAMUSCULAR; INTRAVENOUS; SUBCUTANEOUS at 16:35

## 2019-10-08 RX ADMIN — HYDROMORPHONE HYDROCHLORIDE 0.5 MILLIGRAM(S): 2 INJECTION INTRAMUSCULAR; INTRAVENOUS; SUBCUTANEOUS at 18:00

## 2019-10-08 RX ADMIN — Medication 1 TABLET(S): at 23:01

## 2019-10-08 NOTE — PROGRESS NOTE ADULT - ATTENDING COMMENTS
denies dyspnea, cp, cough  mild amount of vaginal bleeding  rll redness improved  pcn allergy (anaphylaxis/angioedema)    p/  D+ C today by GYN  restart heparin gtt afterwards ---ultimately will be dc'd on DOAC  cont tmp/smx + flagyl
Plan for hysteroscopic ablation with D&C tomorrow    Pre-operative assessment:  RCRI of 0, METS >4  Low/intermediate risk procedure; EKG-reviewed by me with NSR, LAD. TTE reviewed as well, EF 54%, essentially normal TTE.  Combined clinical and surgical risk is low.  We should minimize the amount of time that patient will be off AC for the procedure.  Patient is medically optimized for planned intervention.  BMI of 33 --> Obesity.  Rest as above.

## 2019-10-08 NOTE — PROGRESS NOTE ADULT - PROBLEM SELECTOR PLAN 2
Patient with long history of menorrhagia and 1 month reported history of active bleeding. Currently severely anemic with need for transfusion.  - s/p 2 U PRBC ordered, hemoglobin 7.5-> 9.3  - patient initially refusing transfusion as concerned about HIV/AIDs as mother became HIV+ s/p transfusion many years ago, but explained that blood is screened for HIV/Hep C prior and risk is very low  - maintain active T+S  - will not continue OCP given noncoagulable state  - Ablation tomorrow per gynecology  - NPO at midnight, EKG 10/5 NSR, ECHO: No valvular or ventricular dysfunction, hold heparin AC in morning 4-6 before procedure Patient with long history of menorrhagia and 1 month reported history of active bleeding. Currently severely anemic with need for transfusion.  - s/p 2 U PRBC ordered, hemoglobin 7.5-> 9.3-> 9.0  - patient initially refusing transfusion as concerned about HIV/AIDs as mother became HIV+ s/p transfusion many years ago, but explained that blood is screened for HIV/Hep C prior and risk is very low  - maintain active T+S  - will not continue OCP given noncoagulable state  - Ablation tomorrow per gynecology  - NPO at midnight, EKG 10/5 NSR, ECHO: No valvular or ventricular dysfunction, hold heparin AC in morning 4-6 before procedure

## 2019-10-08 NOTE — PROGRESS NOTE ADULT - SUBJECTIVE AND OBJECTIVE BOX
GYN Progress Note    Patient seen at bedside.  No pain complaints.  NPO since midnight for D&C and hysteroscopic ablation   OOB and Voiding  Vaginal bleeding subsiding per patient.  Denies CP, palpitations, SOB, fever, chills, nausea, vomiting.    Vital Signs Last 24 Hrs  T(C): 36.7 (08 Oct 2019 06:28), Max: 36.9 (07 Oct 2019 21:39)  T(F): 98.1 (08 Oct 2019 06:28), Max: 98.4 (07 Oct 2019 21:39)  HR: 72 (08 Oct 2019 06:28) (72 - 80)  BP: 101/56 (08 Oct 2019 06:28) (101/56 - 125/55)  RR: 15 (08 Oct 2019 06:28) (15 - 20)  SpO2: 100% (08 Oct 2019 06:28) (99% - 100%)    Physical Exam:  Gen: No Acute Distress  Pulm: Clear to auscultation bilaterally  GI: soft, appropriately nontender, nondistended, +BS, no rebound, no guarding.  Incision C/D/I  Ext: SCDs in place, wnl    I&O's Summary    MEDICATIONS  (STANDING):  heparin  Infusion 1500 Unit(s)/Hr (15 mL/Hr) IV Continuous <Continuous>  influenza   Vaccine 0.5 milliLiter(s) IntraMuscular once  metroNIDAZOLE    Tablet 500 milliGRAM(s) Oral every 8 hours  trimethoprim  160 mG/sulfamethoxazole 800 mG 1 Tablet(s) Oral every 12 hours    MEDICATIONS  (PRN):      LABS:                        9.0    6.34  )-----------( 439      ( 08 Oct 2019 06:40 )             31.2     10-08    138  |  105  |  12  ----------------------------<  97  4.1   |  21<L>  |  1.00    Ca    8.6      08 Oct 2019 06:40  Mg     1.9     10-08      PTT - ( 08 Oct 2019 02:42 )  PTT:86.8 sec

## 2019-10-08 NOTE — BRIEF OPERATIVE NOTE - NSICDXBRIEFPROCEDURE_GEN_ALL_CORE_FT
PROCEDURES:  Endometrial ablation 08-Oct-2019 16:35:59  Lei Watkins  Hysteroscopy, with dilation and curettage 08-Oct-2019 16:35:41  Lei Watkins

## 2019-10-08 NOTE — PROGRESS NOTE ADULT - PROBLEM SELECTOR PLAN 5
F- None  E- replete as needed  N- NPO at midnight for ablation  C- FC  DVt ppx: hold in setting of active bleeding

## 2019-10-08 NOTE — PROGRESS NOTE ADULT - SUBJECTIVE AND OBJECTIVE BOX
INTERVAL HPI/OVERNIGHT EVENTS: PTT therapeutic x2 overnight.    SUBJECTIVE: Patient seen and examined at bedside. Complaining of a headache this AM, asked for a tylenol which helped yesterday. Admits to a little anxiousness before the procedure later and wanted to eat this morning. Denies fever, headache, nausea, vomiting, chest pain, shortness of breath, abdominal pain, changes in bowel movements or urination.     OBJECTIVE:    VITAL SIGNS:  ICU Vital Signs Last 24 Hrs  T(C): 36.7 (08 Oct 2019 06:28), Max: 36.9 (07 Oct 2019 21:39)  T(F): 98.1 (08 Oct 2019 06:28), Max: 98.4 (07 Oct 2019 21:39)  HR: 72 (08 Oct 2019 06:28) (72 - 80)  BP: 101/56 (08 Oct 2019 06:28) (101/56 - 125/55)  BP(mean): --  ABP: --  ABP(mean): --  RR: 15 (08 Oct 2019 06:28) (15 - 20)  SpO2: 100% (08 Oct 2019 06:28) (99% - 100%)      PHYSICAL EXAM:    Constitutional: NAD, resting comfortably in bed, answering questions  	HEENT: NC/AT, PERRL, EOMI, anicteric sclera, conjunctival pallor, no nasal discharge; uvula midline, no oropharyngeal erythema or exudates; MMM, neck supple; no JVD or thyromegaly  	Respiratory: CTA B/L; no W/R/R, no retractions  	Cardiac: +S1/S2; RRR; mid systolic click  	Gastrointestinal: soft, NT/ND; no rebound or guarding; +BSx4  	Back: spine midline, no bony tenderness or step-offs; no CVAT B/L  	Extremities: WWP, no clubbing or cyanosis; no peripheral edema; 2 puncture wounds on the anterior aspect of the R shin 3 cm above the ankle with 2-3 mm of surrounding erythema and minimal tenderness without discharge, purulence or fluctuance  	Musculoskeletal: NROM x4; no joint swelling, tenderness or erythema  	Vascular: 2+ radial, femoral, DP/PT pulses B/L  	Dermatologic: skin warm, dry and intact; no rashes, wounds, or scars  	Lymphatic: no submandibular or cervical LAD    Neurologic: AAOx3     MEDICATIONS:  MEDICATIONS  (STANDING):  heparin  Infusion 1500 Unit(s)/Hr (15 mL/Hr) IV Continuous <Continuous>  influenza   Vaccine 0.5 milliLiter(s) IntraMuscular once  metroNIDAZOLE    Tablet 500 milliGRAM(s) Oral every 8 hours  trimethoprim  160 mG/sulfamethoxazole 800 mG 1 Tablet(s) Oral every 12 hours    MEDICATIONS  (PRN):      ALLERGIES:  Allergies    penicillin (Anaphylaxis)    Intolerances        LABS:                        9.0    6.34  )-----------( 439      ( 08 Oct 2019 06:40 )             31.2     10-08    138  |  105  |  12  ----------------------------<  97  4.1   |  21<L>  |  1.00    Ca    8.6      08 Oct 2019 06:40  Mg     1.9     10-08      PTT - ( 08 Oct 2019 02:42 )  PTT:86.8 sec      RADIOLOGY & ADDITIONAL TESTS: Reviewed. INTERVAL HPI/OVERNIGHT EVENTS: PTT therapeutic x2 overnight.    SUBJECTIVE: Patient seen and examined at bedside. Complaining of a headache this AM, asked for a tylenol which helped yesterday. Admits to a little anxiousness before the procedure later and wanted to eat this morning. Denies fever, headache, nausea, vomiting, chest pain, shortness of breath, abdominal pain, changes in bowel movements or urination.     OBJECTIVE:    VITAL SIGNS:  ICU Vital Signs Last 24 Hrs  T(C): 36.7 (08 Oct 2019 06:28), Max: 36.9 (07 Oct 2019 21:39)  T(F): 98.1 (08 Oct 2019 06:28), Max: 98.4 (07 Oct 2019 21:39)  HR: 72 (08 Oct 2019 06:28) (72 - 80)  BP: 101/56 (08 Oct 2019 06:28) (101/56 - 125/55)  BP(mean): --  ABP: --  ABP(mean): --  RR: 15 (08 Oct 2019 06:28) (15 - 20)  SpO2: 100% (08 Oct 2019 06:28) (99% - 100%)      PHYSICAL EXAM:    Constitutional: NAD, resting comfortably in bed, answering questions  	HEENT: NC/AT, PERRL, EOMI, anicteric sclera, conjunctival pallor, no nasal discharge; uvula midline, no oropharyngeal erythema or exudates; MMM, neck supple; no JVD or thyromegaly  	Respiratory: CTA B/L; no W/R/R, no retractions  	Cardiac: +S1/S2; RRR; mid systolic click  	Gastrointestinal: soft, NT/ND; no rebound or guarding; +BSx4  	Back: spine midline, no bony tenderness or step-offs; no CVAT B/L  	Extremities: WWP, no clubbing or cyanosis; no peripheral edema; 2 puncture wounds on the anterior aspect of the R shin 3 cm above the ankle with 2-3 mm of surrounding erythema and minimal tenderness without discharge, purulence or fluctuance  	Musculoskeletal: NROM x4; no joint swelling, tenderness or erythema  	Vascular: 2+ radial, femoral, DP/PT pulses B/L  	Dermatologic: skin warm, dry and intact; no rashes, wounds, or scars  	Lymphatic: no submandibular or cervical LAD    Neurologic: AAOx3 , cn ii-xii intac, 5/5 str all 4 ext, sens intact    MEDICATIONS:  MEDICATIONS  (STANDING):  heparin  Infusion 1500 Unit(s)/Hr (15 mL/Hr) IV Continuous <Continuous>  influenza   Vaccine 0.5 milliLiter(s) IntraMuscular once  metroNIDAZOLE    Tablet 500 milliGRAM(s) Oral every 8 hours  trimethoprim  160 mG/sulfamethoxazole 800 mG 1 Tablet(s) Oral every 12 hours    MEDICATIONS  (PRN):      ALLERGIES:  Allergies    penicillin (Anaphylaxis)    Intolerances        LABS:                        9.0    6.34  )-----------( 439      ( 08 Oct 2019 06:40 )             31.2     10-08    138  |  105  |  12  ----------------------------<  97  4.1   |  21<L>  |  1.00    Ca    8.6      08 Oct 2019 06:40  Mg     1.9     10-08      PTT - ( 08 Oct 2019 02:42 )  PTT:86.8 sec      RADIOLOGY & ADDITIONAL TESTS: Reviewed.

## 2019-10-08 NOTE — PROGRESS NOTE ADULT - ASSESSMENT
The patient is a 44 yr old female with hx of intermittent bleeding from uterine fibroids and anemia, Mitral valve prolapse, cat bites on leg who presents to the hospital for evaluation of dyspnea on exertion.        Problem/Recommendation - 1:  Problem: Pulmonary embolism. Recommendation: Right segmental and subsegmental PE provoked by OCP use.   hemodynamically stable, Trop negative, . No evidence of right heart strain on CT scan. - ECHO shows no Right heart strain. DVT scan negative.  Recommendations:   - Continue of heparin for now , if hysteroscopic ablation planned for today please switch off 6 hrs before the procedure and restart as soon as possible per gynecology recommendation.   - Postoperatively, she can be transitioned to Oral anticoagulant / Eliquis and wastch her for next 24 hrs for bleeding.   - Encourage early ambulation with PT.  - Check Ambulatory saturations.  - Follow up with Pulmonary in 2-3 months  - Anticoagulation for at least 3-6 months for now.        Problem/Recommendation - 2:  Problem: Menorrhagia.  Recommendation:  Hx of menorrhagia due to multiple uterine fibroids ( prior hysteroscopic removal 2017)  vaginal bleeding from multiple uterine fibroids. Monitor Hb closely and quantify vaginal bleed as number of pads soaked. If significant increase/ hemodynamic instability, may have to hold heparin drip and be considered for IVC filter.  - Plan for endometrial ablation inpatient now, may be today, patient NPO.   - Low risk of pulmonary complications ARISCAT ( 1.8 %).  - Minimize time off of the anticoagulation if ablation is happening today.

## 2019-10-08 NOTE — PROGRESS NOTE ADULT - PROBLEM SELECTOR PLAN 3
Patient with suspected cellulitis of lower extremity s/p cat bite without any purulence, edema or fluctuance.   - patient without leukocytosis, fever, or hemodynamic instability  - will c/w bactrim and flagyl for total 5 day course, day 3 of 5.

## 2019-10-08 NOTE — PROGRESS NOTE ADULT - ASSESSMENT
43 yo F with PMH of fibroids and MVP who presents with complaints of shortness of breath and RLE pain and swelling secondary to a cat bite, with confirmed PE on CT imaging and concerns for lower extremity cellulitis.     # Acute pulmonary embolism without acute cor pulmonale, unspecified pulmonary embolism type.  Plan: Patient with symptoms of shortness of breath and preceding LE swelling and trauma concerning for DVT, with confirmed PE (subsegmental and segmental) on CT imaging and no evidence of right heart strain. Patient also on OCPs for management of her menorrhagia which likely contributed to procoagulable state.   - given b/l and multiple PEs in addition to procoagulable state likely 2/2 OCP use, patient would benefit from AC, however, has risks of bleeding given active menorrhagia  - f/u Pulmonology recs  - EKG currently NSR without signs of heart strain  - ECHO: No acute valvular disease or ventricular dysfunction  - Bilateral LE ultrasound: No thrombosis  - monitor for chest pain, tachycardia, changes in vitals  - Continue heparin anticoagulation, hold in AM for ablation.     #  Menorrhagia: Patient with long history of menorrhagia and 1 month reported history of active bleeding. Currently severely anemic with need for transfusion.  - s/p 2 U PRBC ordered, hemoglobin 7.5-> 9.3>9.0  - maintain active T+S  - will not continue OCP given noncoagulable state  - Planed for D&C andf hysteroscopic Ablation today (10/08) at 15:00  - NPO at midnight, EKG 10/5 NSR, ECHO: No valvular or ventricular dysfunction, hold heparin AC in morning 4-6 before procedure.       # Cellulitis of right lower extremity: Patient with suspected cellulitis of lower extremity s/p cat bite without any purulence, edema or fluctuance.   - patient without leukocytosis, fever, or hemodynamic instability  - will c/w bactrim and flagyl for total 5 day course, day 3 of 5.

## 2019-10-08 NOTE — PROGRESS NOTE ADULT - PROBLEM SELECTOR PLAN 4
Patient with history of KIANA secondary to blood loss from menorrhagia, currently hemodynamically stable  - plan as per problem 2 Patient with history of KIANA secondary to blood loss from menorrhagia, currently hemodynamically stable  - hemoglobin 7.5-> 9.3-> 9.0  - See above problem menorrhagia

## 2019-10-08 NOTE — PROGRESS NOTE ADULT - SUBJECTIVE AND OBJECTIVE BOX
PULMONARY CONSULT FOLLOW-UP NOTE    INTERVAL HISTORY:   Reviewed chart and overnight events; patient seen and examined at bedside.            MEDICATIONS:  Pulmonary:    Antimicrobials:  metroNIDAZOLE    Tablet 500 milliGRAM(s) Oral every 8 hours  trimethoprim  160 mG/sulfamethoxazole 800 mG 1 Tablet(s) Oral every 12 hours    Anticoagulants:  heparin  Infusion 1500 Unit(s)/Hr IV Continuous <Continuous>    Cardiac:      Allergies    penicillin (Anaphylaxis)    Intolerances        Vital Signs Last 24 Hrs  T(C): 36.7 (08 Oct 2019 06:28), Max: 36.9 (07 Oct 2019 21:39)  T(F): 98.1 (08 Oct 2019 06:28), Max: 98.4 (07 Oct 2019 21:39)  HR: 72 (08 Oct 2019 06:28) (72 - 80)  BP: 101/56 (08 Oct 2019 06:28) (101/56 - 125/55)  BP(mean): --  RR: 15 (08 Oct 2019 06:28) (15 - 20)  SpO2: 100% (08 Oct 2019 06:28) (99% - 100%)        PHYSICAL EXAM:  Constitutional: WDWN  HEENT: NC/AT; PERRL, anicteric sclera; MMM  Neck: supple  Cardiovascular: +S1/S2, RRR  Respiratory: CTA B/L; no W/R/R  Gastrointestinal: soft, NT/ND; +BSx4  Extremities: WWP; no edema, clubbing or cyanosis  Vascular: 2+ radial, DP/PT pulses B/L  Neurological: AAOx3; no focal deficits    LABS:      CBC Full  -  ( 08 Oct 2019 06:40 )  WBC Count : 6.34 K/uL  RBC Count : 4.30 M/uL  Hemoglobin : 9.0 g/dL  Hematocrit : 31.2 %  Platelet Count - Automated : 439 K/uL  Mean Cell Volume : 72.6 fl  Mean Cell Hemoglobin : 20.9 pg  Mean Cell Hemoglobin Concentration : 28.8 gm/dL  Auto Neutrophil # : x  Auto Lymphocyte # : x  Auto Monocyte # : x  Auto Eosinophil # : x  Auto Basophil # : x  Auto Neutrophil % : x  Auto Lymphocyte % : x  Auto Monocyte % : x  Auto Eosinophil % : x  Auto Basophil % : x      PTT - ( 08 Oct 2019 02:42 )  PTT:86.8 sec PULMONARY CONSULT FOLLOW-UP NOTE    INTERVAL HISTORY:   Reviewed chart and overnight events; patient seen and examined at bedside.  Patient seen at bedside.  No pain complaints.  NPO since midnight for D&C and hysteroscopic ablation   OOB and Voiding  Vaginal bleeding subsiding per patient.      MEDICATIONS:  Pulmonary:    Antimicrobials:  metroNIDAZOLE    Tablet 500 milliGRAM(s) Oral every 8 hours  trimethoprim  160 mG/sulfamethoxazole 800 mG 1 Tablet(s) Oral every 12 hours    Anticoagulants:  heparin  Infusion 1500 Unit(s)/Hr IV Continuous <Continuous>    Cardiac:      Allergies    penicillin (Anaphylaxis)    Intolerances        Vital Signs Last 24 Hrs  T(C): 36.7 (08 Oct 2019 06:28), Max: 36.9 (07 Oct 2019 21:39)  T(F): 98.1 (08 Oct 2019 06:28), Max: 98.4 (07 Oct 2019 21:39)  HR: 72 (08 Oct 2019 06:28) (72 - 80)  BP: 101/56 (08 Oct 2019 06:28) (101/56 - 125/55)  BP(mean): --  RR: 15 (08 Oct 2019 06:28) (15 - 20)  SpO2: 100% (08 Oct 2019 06:28) (99% - 100%)        PHYSICAL EXAM:  Constitutional: WDWN  HEENT: NC/AT; PERRL, anicteric sclera; MMM  Neck: supple  Cardiovascular: +S1/S2, RRR  Respiratory: CTA B/L; no W/R/R  Gastrointestinal: soft, NT/ND; +BSx4  Extremities: WWP; no edema, clubbing or cyanosis  Vascular: 2+ radial, DP/PT pulses B/L  Neurological: AAOx3; no focal deficits    LABS:      CBC Full  -  ( 08 Oct 2019 06:40 )  WBC Count : 6.34 K/uL  RBC Count : 4.30 M/uL  Hemoglobin : 9.0 g/dL  Hematocrit : 31.2 %  Platelet Count - Automated : 439 K/uL  Mean Cell Volume : 72.6 fl  Mean Cell Hemoglobin : 20.9 pg  Mean Cell Hemoglobin Concentration : 28.8 gm/dL  Auto Neutrophil # : x  Auto Lymphocyte # : x  Auto Monocyte # : x  Auto Eosinophil # : x  Auto Basophil # : x  Auto Neutrophil % : x  Auto Lymphocyte % : x  Auto Monocyte % : x  Auto Eosinophil % : x  Auto Basophil % : x      PTT - ( 08 Oct 2019 02:42 )  PTT:86.8 sec

## 2019-10-08 NOTE — PROGRESS NOTE ADULT - PROBLEM SELECTOR PLAN 1
Patient with symptoms of shortness of breath and preceding LE swelling and trauma concerning for DVT, with confirmed PE (subsegmental and segmental) on CT imaging and no evidence of right heart strain. Patient also on OCPs for management of her menorrhagia which likely contributed to procoagulable state.   - given b/l and multiple PEs in addition to procoagulable state likely 2/2 OCP use, patient would benefit from AC, however, has risks of bleeding given active menorrhagia  - f/u Pulmonology recs  - EKG currently NSR without signs of heart strain  - ECHO: No acute valvular disease or ventricular dysfunction  - Bilateral LE ultrasound: No thrombosis  - monitor for chest pain, tachycardia, changes in vitals  - Continue heparin anticoagulation, hold in AM for ablation Patient with symptoms of shortness of breath and preceding LE swelling and trauma concerning for DVT, with confirmed PE (subsegmental and segmental) on CT imaging and no evidence of right heart strain. Patient also on OCPs for management of her menorrhagia which likely contributed to procoagulable state.   - given b/l and multiple PEs in addition to procoagulable state likely 2/2 OCP use, patient would benefit from AC, however, has risks of bleeding given active menorrhagia  - f/u Pulmonology recs  - EKG currently NSR without signs of heart strain  - ECHO: No acute valvular disease or ventricular dysfunction  - Bilateral LE ultrasound: No thrombosis  - monitor for chest pain, tachycardia, changes in vitals  - Continue heparin anticoagulation, hold in AM for ablation  - Will discuss PO anticoagulation vs heparin after abalation

## 2019-10-09 ENCOUNTER — TRANSCRIPTION ENCOUNTER (OUTPATIENT)
Age: 44
End: 2019-10-09

## 2019-10-09 LAB
ANION GAP SERPL CALC-SCNC: 10 MMOL/L — SIGNIFICANT CHANGE UP (ref 5–17)
APTT BLD: 42.9 SEC — HIGH (ref 27.5–36.3)
BUN SERPL-MCNC: 10 MG/DL — SIGNIFICANT CHANGE UP (ref 7–23)
CALCIUM SERPL-MCNC: 9.6 MG/DL — SIGNIFICANT CHANGE UP (ref 8.4–10.5)
CHLORIDE SERPL-SCNC: 106 MMOL/L — SIGNIFICANT CHANGE UP (ref 96–108)
CO2 SERPL-SCNC: 21 MMOL/L — LOW (ref 22–31)
CREAT SERPL-MCNC: 0.92 MG/DL — SIGNIFICANT CHANGE UP (ref 0.5–1.3)
GLUCOSE SERPL-MCNC: 127 MG/DL — HIGH (ref 70–99)
HCT VFR BLD CALC: 30.7 % — LOW (ref 34.5–45)
HGB BLD-MCNC: 8.8 G/DL — LOW (ref 11.5–15.5)
MAGNESIUM SERPL-MCNC: 1.8 MG/DL — SIGNIFICANT CHANGE UP (ref 1.6–2.6)
MCHC RBC-ENTMCNC: 20.7 PG — LOW (ref 27–34)
MCHC RBC-ENTMCNC: 28.7 GM/DL — LOW (ref 32–36)
MCV RBC AUTO: 72.1 FL — LOW (ref 80–100)
NRBC # BLD: 0 /100 WBCS — SIGNIFICANT CHANGE UP (ref 0–0)
PLATELET # BLD AUTO: 468 K/UL — HIGH (ref 150–400)
POTASSIUM SERPL-MCNC: 5 MMOL/L — SIGNIFICANT CHANGE UP (ref 3.5–5.3)
POTASSIUM SERPL-SCNC: 5 MMOL/L — SIGNIFICANT CHANGE UP (ref 3.5–5.3)
RBC # BLD: 4.26 M/UL — SIGNIFICANT CHANGE UP (ref 3.8–5.2)
RBC # FLD: 19.6 % — HIGH (ref 10.3–14.5)
SODIUM SERPL-SCNC: 137 MMOL/L — SIGNIFICANT CHANGE UP (ref 135–145)
WBC # BLD: 8.37 K/UL — SIGNIFICANT CHANGE UP (ref 3.8–10.5)
WBC # FLD AUTO: 8.37 K/UL — SIGNIFICANT CHANGE UP (ref 3.8–10.5)

## 2019-10-09 RX ORDER — HEPARIN SODIUM 5000 [USP'U]/ML
1500 INJECTION INTRAVENOUS; SUBCUTANEOUS
Qty: 25000 | Refills: 0 | Status: DISCONTINUED | OUTPATIENT
Start: 2019-10-09 | End: 2019-10-09

## 2019-10-09 RX ORDER — METRONIDAZOLE 500 MG
1 TABLET ORAL
Qty: 19 | Refills: 0
Start: 2019-10-09 | End: 2019-10-14

## 2019-10-09 RX ORDER — LANOLIN ALCOHOL/MO/W.PET/CERES
5 CREAM (GRAM) TOPICAL AT BEDTIME
Refills: 0 | Status: DISCONTINUED | OUTPATIENT
Start: 2019-10-09 | End: 2019-10-10

## 2019-10-09 RX ORDER — APIXABAN 2.5 MG/1
2 TABLET, FILM COATED ORAL
Qty: 25 | Refills: 0
Start: 2019-10-09 | End: 2019-10-14

## 2019-10-09 RX ORDER — APIXABAN 2.5 MG/1
10 TABLET, FILM COATED ORAL EVERY 12 HOURS
Refills: 0 | Status: DISCONTINUED | OUTPATIENT
Start: 2019-10-09 | End: 2019-10-10

## 2019-10-09 RX ORDER — ACETAMINOPHEN 500 MG
650 TABLET ORAL ONCE
Refills: 0 | Status: COMPLETED | OUTPATIENT
Start: 2019-10-09 | End: 2019-10-09

## 2019-10-09 RX ORDER — NORETHINDRONE AND ETHINYL ESTRADIOL 0.4-0.035
1 KIT ORAL
Qty: 0 | Refills: 0 | DISCHARGE

## 2019-10-09 RX ADMIN — Medication 1 TABLET(S): at 22:13

## 2019-10-09 RX ADMIN — Medication 650 MILLIGRAM(S): at 13:31

## 2019-10-09 RX ADMIN — APIXABAN 10 MILLIGRAM(S): 2.5 TABLET, FILM COATED ORAL at 09:38

## 2019-10-09 RX ADMIN — Medication 650 MILLIGRAM(S): at 14:30

## 2019-10-09 RX ADMIN — Medication 500 MILLIGRAM(S): at 22:06

## 2019-10-09 RX ADMIN — Medication 500 MILLIGRAM(S): at 06:22

## 2019-10-09 RX ADMIN — Medication 500 MILLIGRAM(S): at 13:31

## 2019-10-09 RX ADMIN — APIXABAN 10 MILLIGRAM(S): 2.5 TABLET, FILM COATED ORAL at 22:11

## 2019-10-09 RX ADMIN — HEPARIN SODIUM 15 UNIT(S)/HR: 5000 INJECTION INTRAVENOUS; SUBCUTANEOUS at 01:44

## 2019-10-09 RX ADMIN — Medication 1 TABLET(S): at 12:08

## 2019-10-09 RX ADMIN — Medication 5 MILLIGRAM(S): at 22:06

## 2019-10-09 NOTE — DISCHARGE NOTE PROVIDER - NSDCFUSCHEDAPPT_GEN_ALL_CORE_FT
RICHARD LAKHWINDER ; 10/28/2019 ; [facility] [address] RICHARD LAKHWINDER ; 10/28/2019 ; [facility] [address] RICHARD LAKHWINDER ; 10/28/2019 ; [facility] [address] RICHARD LAKHWINDER ; 10/28/2019 ; [facility] [address] RICHARD LAKHWINDER ; 10/28/2019 ; [facility] [address]

## 2019-10-09 NOTE — PROGRESS NOTE ADULT - REASON FOR ADMISSION
Animal bite, shortness of breath

## 2019-10-09 NOTE — PROGRESS NOTE ADULT - ASSESSMENT
43 yo F with PMH of fibroids and MVP who presents with complaints of shortness of breath and RLE pain and swelling secondary to a cat bite, with confirmed PE on CT imaging and concerns for lower extremity cellulitis.     [ ]  Menorrhagia: Patient with long history of menorrhagia and 1 month reported history of active bleeding. S/p hysteroscopic D&C and ablation, uncomplicated    [ ] Acute pulmonary embolism  - Managed by primary team  - Plan to resume anticoagulation today with Eliquis 10mg PO BID    [ ] Cellulitis of right lower extremity: Patient with suspected cellulitis of lower extremity s/p cat bite without any purulence, edema or fluctuance.   - Managed by primary team  - Will c/w bactrim and flagyl for total 5 day course, day 4 of 5.

## 2019-10-09 NOTE — PROGRESS NOTE ADULT - PROBLEM SELECTOR PLAN 4
Patient with history of KIANA secondary to blood loss from menorrhagia, currently hemodynamically stable  - hemoglobin 7.5-> 9.3-> 9.0  - See above problem menorrhagia

## 2019-10-09 NOTE — CHART NOTE - NSCHARTNOTEFT_GEN_A_CORE
Ambulated patient down vail on room air to assess O2 sat and HR - while walking w/ pulse ox down the vail to elevator patient noted to be saturating % on room air, no SOB, able to maintain a full conversation. HR remained in the 90s, briefly 100bpm. No chest pain, dyspnea w/ exertion.

## 2019-10-09 NOTE — PROGRESS NOTE ADULT - PROBLEM SELECTOR PLAN 2
Patient with long history of menorrhagia and 1 month reported history of active bleeding. Currently severely anemic with need for transfusion.  - s/p 2 U PRBC ordered, hemoglobin 7.5-> 9.3-> 9.0  - patient initially refusing transfusion as concerned about HIV/AIDs as mother became HIV+ s/p transfusion many years ago, but explained that blood is screened for HIV/Hep C prior and risk is very low  - maintain active T+S  - will not continue OCP given noncoagulable state  - Ablation tolerated procudure well, f/u with gyn

## 2019-10-09 NOTE — PROGRESS NOTE ADULT - PROBLEM SELECTOR PLAN 3
Patient with suspected cellulitis of lower extremity s/p cat bite without any purulence, edema or fluctuance.   - patient without leukocytosis, fever, or hemodynamic instability  - will c/w bactrim and flagyl for total 10 day course, day 4 of 10. will complete outpatient

## 2019-10-09 NOTE — DISCHARGE NOTE PROVIDER - HOSPITAL COURSE
45 yo  F with PMH of fibroids and mitral valve prolapse who presents with complaints of cat bite to the RLE that happened 4 days ago, and 1 week of preceding shortness of breath that was accompanied by RLE swelling and pain. Pt was found to have segmental and subsegmental branches without evidence of right heart strain.        Problem List/Inpatient treatment course:     # Acute pulmonary embolism without acute cor pulmonale    - CT Angio Chest PE Protocol w/ IV Cont (10.05.19 @ 23:09): Pulmonary emboli within the segmental/subsegmental branches of the right middle and lower lobes and possibly in the subsegmental branches of the left lower lobe. No CT evidence of right heart strain or pulmonary infarction.    - Started on IV heparin until therapeutic x3 PTT, held for ablation, started on Eliquis 10mg twice a day for 6 days, followed by Eliquis 5mg twice a day for 3 months.        # Menorrhagia    - Hx of 1 month of dialy bleeds. Hgb 6.7 on admission. Transfused 2 units of pRBCs with correction of Hgb to 9    - Gynecology consulted and performed endometrial ablation of the bleed without complication        # Cat Bite    - Recent cat bite on admission, treated empirically per IDSA guidelines.    - Bactrim DS 1 tablet twice a day and flagyl 500mg every 8hours for 10 days        New medications: Eliquis 10mg twice a day for 6 days, followed by Eliquis 5mg twice a day for 3 months. Bactrim DS 1 tablet twice a day until 10/15 and flagyl 500mg every 8hours until 10/15    Labs to be followed outpatient: None    Exam to be followed outpatient: None

## 2019-10-09 NOTE — DISCHARGE NOTE PROVIDER - NSDCCPCAREPLAN_GEN_ALL_CORE_FT
PRINCIPAL DISCHARGE DIAGNOSIS  Diagnosis: Acute pulmonary embolism without acute cor pulmonale, unspecified pulmonary embolism type  Assessment and Plan of Treatment: You were diagnosed with a pulmonary embolism, a blood clot in your lungs. We found the clot on Cat scan of your lungs. You were started on a blood thinner called IV heparin to treat the blood clots. Because you were having episodes of vaginal bleeding, gynecology was consulted and they performed an ablation to stop the bleeding. You will continue with an oral version the blood thinner for treatment of the pulmonary embolism called Apixiban (Brand name Eliquis). You should take Eliquis 10mg twice a day for 6 days from 10/10/2019-10/15/2019. Then you will switch to Eliquis 5mg twice a day for three months. You will follow with Dr. Morris (Pulmonologist) outpatient within 1-2 weeks of discharge and he will manage the final duration of the blood thinner. You should also follow-up with your primary care provider within 1-2 weeks of discharge. If you have significant vaginal bleeding or new bleeding, please seek immediate medical attention.      SECONDARY DISCHARGE DIAGNOSES  Diagnosis: Menorrhagia  Assessment and Plan of Treatment: You came in with menorrhagia and daily bleeding. You were found to have a low red blood cell level and you received a transfusion of blood. Gynecology was consulted and performed an ablation to stop the bleeding. Now that the bleeding has stopped, your anemia should resolve and you should follow up with your PCP and gynecologist within 1-2 weeks of discharge to discuss the menorrhagia and procedure.    Diagnosis: Cellulitis of right lower extremity  Assessment and Plan of Treatment: You came in with a cat bite on your leg. To treat bite wounds, you were started on antibiotics called flagyl and bactrim. You should continue to take these medications after discharge. You will take one dose of the flagyl and one dose of the bactrim tonight on 10/09/2019 after discharge. Starting tomorrow, you will take the flagyl three times per day (recommend 6AM, 4PM, 10PM) and Bactrim twice a day (6 AM and 6 PM) for 6 days, to be completed on 10/15/2019. You should also follow-up with your primary care provider within 1-2 weeks of discharge. PRINCIPAL DISCHARGE DIAGNOSIS  Diagnosis: Acute pulmonary embolism without acute cor pulmonale, unspecified pulmonary embolism type  Assessment and Plan of Treatment: You were diagnosed with a pulmonary embolism, a blood clot in your lungs. We found the clot on Cat scan of your lungs. You were started on a blood thinner called IV heparin to treat the blood clots. Because you were having episodes of vaginal bleeding, gynecology was consulted and they performed an ablation to stop the bleeding. You will continue with an oral version the blood thinner for treatment of the pulmonary embolism called Apixiban (Brand name Eliquis). You should take Eliquis 10mg twice a day for 6 days from 10/10/2019-10/15/2019. Then you will switch to Eliquis 5mg twice a day for three months. You will follow with Dr. Morris (Pulmonologist) outpatient within 1-2 weeks of discharge and he will manage the final duration of the blood thinner. You should also follow-up with your primary care provider within 1-2 weeks of discharge. If you have significant vaginal bleeding or new bleeding, please seek immediate medical attention.      SECONDARY DISCHARGE DIAGNOSES  Diagnosis: Menorrhagia  Assessment and Plan of Treatment: You came in with menorrhagia and daily bleeding. You were found to have a low red blood cell level and you received a transfusion of blood. Gynecology was consulted and performed an ablation to stop the bleeding. Now that the bleeding has stopped, your anemia should resolve and you should follow up with your PCP after discharge. We stopped your oral birth control and you should follow-up with your gynecologist within 1-2 weeks of discharge to discuss the menorrhagia other birth control options should you need them.    Diagnosis: Cellulitis of right lower extremity  Assessment and Plan of Treatment: You came in with a cat bite on your leg. To treat bite wounds, you were started on antibiotics called flagyl and bactrim. You should continue to take these medications after discharge. You will take one dose of the flagyl and one dose of the bactrim tonight on 10/09/2019 after discharge. Starting tomorrow, you will take the flagyl three times per day (recommend 6AM, 4PM, 10PM) and Bactrim twice a day (6 AM and 6 PM) for 6 days, to be completed on 10/15/2019. You should also follow-up with your primary care provider within 1-2 weeks of discharge. PRINCIPAL DISCHARGE DIAGNOSIS  Diagnosis: Acute pulmonary embolism without acute cor pulmonale, unspecified pulmonary embolism type  Assessment and Plan of Treatment: You were diagnosed with a pulmonary embolism, a blood clot in your lungs. We found the clot on Cat scan of your lungs. You were started on a blood thinner called IV heparin to treat the blood clots. Because you were having episodes of vaginal bleeding, gynecology was consulted and they performed an ablation to stop the bleeding. You will continue with an oral version the blood thinner for treatment of the pulmonary embolism called Apixiban (Brand name Eliquis). You should take Eliquis 10mg (two 5mg tablets) tonight, followed by Eliquis 10mg (two 5mg tablets) twice a day starting tomorrow for 6 days from 10/10/2019-10/15/2019. Then you will switch to Eliquis 5mg (one 5mg tablet) twice a day for three months. You will follow with Dr. Morris (Pulmonologist) outpatient within 1-2 weeks of discharge and he will manage the final duration of the blood thinner. You should also follow-up with your primary care provider within 1-2 weeks of discharge. If you have significant vaginal bleeding or new bleeding, please seek immediate medical attention.      SECONDARY DISCHARGE DIAGNOSES  Diagnosis: Menorrhagia  Assessment and Plan of Treatment: You came in with menorrhagia and daily bleeding. You were found to have a low red blood cell level and you received a transfusion of blood. Gynecology was consulted and performed an ablation to stop the bleeding. Now that the bleeding has stopped, your anemia should resolve and you should follow up with your PCP after discharge. We stopped your oral birth control and you should follow-up with your gynecologist within 1-2 weeks of discharge to discuss the menorrhagia other birth control options should you need them.    Diagnosis: Cellulitis of right lower extremity  Assessment and Plan of Treatment: You came in with a cat bite on your leg. To treat bite wounds, you were started on antibiotics called flagyl and bactrim. You should continue to take these medications after discharge. You will take one dose of the flagyl and one dose of the bactrim tonight on 10/09/2019 after discharge. Starting tomorrow, you will take the flagyl three times per day (recommend 6AM, 4PM, 10PM) and Bactrim twice a day (6 AM and 6 PM) for 6 days, to be completed on 10/15/2019. You should also follow-up with your primary care provider within 1-2 weeks of discharge. PRINCIPAL DISCHARGE DIAGNOSIS  Diagnosis: Acute pulmonary embolism without acute cor pulmonale, unspecified pulmonary embolism type  Assessment and Plan of Treatment: You were diagnosed with a pulmonary embolism, a blood clot in your lungs. We found the clot on Cat scan of your lungs. You were started on a blood thinner called IV heparin to treat the blood clots. Because you were having episodes of vaginal bleeding, gynecology was consulted and they performed an ablation to stop the bleeding. You will continue with an oral version the blood thinner for treatment of the pulmonary embolism called Apixiban (Brand name Eliquis). You should take Eliquis 10mg (two 5mg tablets) tonight, followed by Eliquis 10mg (two 5mg tablets) twice a day starting tomorrow for 6 days from 10/10/2019-10/15/2019. Then you will switch to Eliquis 5mg (one 5mg tablet) twice a day for three months. You will follow with Dr. Morris (Pulmonologist) outpatient within 1-2 weeks of discharge and he will manage the final duration of the blood thinner. We contacted Dr. Morris's office and they should be reaching out to you regarding an appointment. If you do not hear from them within 1-2 days please call them at [phone]. You should also follow-up with your primary care provider within 1-2 weeks of discharge. If you have significant vaginal bleeding or new bleeding, please seek immediate medical attention.      SECONDARY DISCHARGE DIAGNOSES  Diagnosis: Menorrhagia  Assessment and Plan of Treatment: You came in with menorrhagia and daily bleeding. You were found to have a low red blood cell level and you received a transfusion of blood. Gynecology was consulted and performed an ablation to stop the bleeding. Now that the bleeding has stopped, your anemia should resolve and you should follow up with your PCP after discharge. We stopped your oral birth control and you should follow-up with your gynecologist within 1-2 weeks of discharge to discuss the menorrhagia other birth control options should you need them.    Diagnosis: Cellulitis of right lower extremity  Assessment and Plan of Treatment: You came in with a cat bite on your leg. To treat bite wounds, you were started on antibiotics called flagyl and bactrim. You should continue to take these medications after discharge. You will take one dose of the flagyl and one dose of the bactrim tonight on 10/09/2019 after discharge. Starting tomorrow, you will take the flagyl three times per day (recommend 6AM, 4PM, 10PM) and Bactrim twice a day (6 AM and 6 PM) for 6 days, to be completed on 10/15/2019. You should also follow-up with your primary care provider within 1-2 weeks of discharge. PRINCIPAL DISCHARGE DIAGNOSIS  Diagnosis: Acute pulmonary embolism without acute cor pulmonale, unspecified pulmonary embolism type  Assessment and Plan of Treatment: You were diagnosed with a pulmonary embolism, a blood clot in your lungs. We found the clot on Cat scan of your lungs. You were started on a blood thinner called IV heparin to treat the blood clots. Because you were having episodes of vaginal bleeding, gynecology was consulted and they performed an ablation to stop the bleeding. You will continue with an oral version the blood thinner for treatment of the pulmonary embolism called Apixiban (Brand name Eliquis). You should take Eliquis 10mg (two 5mg tablets) twice a day starting tomorrow for 6 days from 10/10/2019-10/15/2019. Then, on 10/16, you will switch to Eliquis 5mg (one 5mg tablet) twice a day for three months. You will follow with Dr. Morris (Pulmonologist) outpatient after discharge. You have an appointment on Monday, October 28th at 10:30 am. They will continue and make decisions on your management. Dr. Morris's office number, if needed [phone]. You should also follow-up with your primary care provider within 1-2 weeks of discharge.      SECONDARY DISCHARGE DIAGNOSES  Diagnosis: Menorrhagia  Assessment and Plan of Treatment: You came in with menorrhagia and daily bleeding. You were found to have a low red blood cell level and you received a transfusion of blood. Gynecology was consulted and performed an ablation to stop the bleeding. Now that the bleeding has stopped, your anemia should resolve and you should follow up with your PCP after discharge. We stopped your oral birth control and you should follow-up with your gynecologist within 2 weeks of discharge to discuss the menorrhagia other birth control options should you need them.    Diagnosis: Cellulitis of right lower extremity  Assessment and Plan of Treatment: You came in with a cat bite on your leg. To treat bite wounds, you were started on antibiotics called flagyl and bactrim. You should continue to take these medications after discharge. Starting on 10/10/19, you will take the flagyl three times per day (recommend 6AM, 4PM, 10PM) and Bactrim twice a day (6 AM and 6 PM) for 6 days, to be completed on 10/15/2019. You should also follow-up with your primary care provider within 2 weeks of discharge. PRINCIPAL DISCHARGE DIAGNOSIS  Diagnosis: Acute pulmonary embolism without acute cor pulmonale, unspecified pulmonary embolism type  Assessment and Plan of Treatment: You were diagnosed with a pulmonary embolism, a blood clot in your lungs. We found the clot on Cat scan of your lungs. You were started on a blood thinner called IV heparin to treat the blood clots. Because you were having episodes of vaginal bleeding, gynecology was consulted and they performed an ablation to stop the bleeding. You will continue with an oral version the blood thinner for treatment of the pulmonary embolism called Apixiban (Brand name Eliquis). Starting 10/10/19, you should take Eliquis 10mg (two 5mg tablets) twice a day for 6 days from 10/10/2019-10/15/2019. Then, on 10/16, you will switch to Eliquis 5mg (one 5mg tablet) twice a day for three months, but final time to be decided by Dr. Morris. You will follow with Dr. Morris (Pulmonologist) outpatient after discharge. You have an appointment on Monday, October 28th at 10:30 am. They will continue and make decisions on how long you should you should be on blood thinners. Dr. Morris's office number, if needed [phone]. You should also follow-up with your primary care provider within 1-2 weeks of discharge.      SECONDARY DISCHARGE DIAGNOSES  Diagnosis: Menorrhagia  Assessment and Plan of Treatment: You came in with menorrhagia and daily bleeding. You were found to have a low red blood cell level and you received a transfusion of blood. Gynecology was consulted and performed an ablation to stop the bleeding. Now that the bleeding has stopped, your anemia should resolve and you should follow up with your PCP after discharge. We stopped your oral birth control and you should follow-up with your gynecologist within 2 weeks of discharge to discuss the menorrhagia other birth control options should you need them.    Diagnosis: Cellulitis of right lower extremity  Assessment and Plan of Treatment: You came in with a cat bite on your leg. To treat bite wounds, you were started on antibiotics called flagyl and bactrim. You should continue to take these medications after discharge. Starting on 10/10/19, you will take the flagyl three times per day (recommend 6AM, 4PM, 10PM) and Bactrim twice a day (6 AM and 6 PM) for 6 days, to be completed on 10/15/2019. You should also follow-up with your primary care provider within 2 weeks of discharge. PRINCIPAL DISCHARGE DIAGNOSIS  Diagnosis: Acute pulmonary embolism without acute cor pulmonale, unspecified pulmonary embolism type  Assessment and Plan of Treatment: You were diagnosed with a pulmonary embolism, a blood clot in your lungs. We found the clot on Cat scan of your lungs. You were started on a blood thinner called IV heparin to treat the blood clots. Because you were having episodes of vaginal bleeding, gynecology was consulted and they performed an ablation to stop the bleeding. You will continue with an oral version the blood thinner for treatment of the pulmonary embolism called Apixiban (Brand name Eliquis). Starting 10/10/19, you should take Eliquis 10mg (two 5mg tablets) twice a day for 6 days from 10/10/2019-10/15/2019. Then, on 10/16, you will switch to Eliquis 5mg (one 5mg tablet) twice a day for three months, but final time to be decided by Dr. Morris. You will follow with Dr. Morris (Pulmonologist) outpatient after discharge. You have an appointment on Monday, October 28th at 10:30 am. They will continue and make decisions on how long you should you should be on blood thinners. Dr. Morris's office number, if needed [phone]. You should also follow-up with your primary care provider within 1-2 weeks of discharge.      SECONDARY DISCHARGE DIAGNOSES  Diagnosis: Menorrhagia  Assessment and Plan of Treatment: You came in with menorrhagia and daily bleeding. You were found to have a low red blood cell level and you received a transfusion of blood. Gynecology was consulted and performed an ablation to stop the bleeding. Now that the bleeding has stopped, your anemia should resolve and you should follow up with your PCP after discharge. We stopped your oral birth control and you should follow-up with your gynecologist within 2 weeks of discharge to discuss the menorrhagia other birth control options should you need them.    Diagnosis: Cellulitis of right lower extremity  Assessment and Plan of Treatment: You came in with a cat bite on your leg. To treat bite wounds, you were started on antibiotics called flagyl and bactrim. You should continue to take these medications after discharge. Starting on 10/10/19, you will take the flagyl three times per day (recommend 6AM, 4PM, 10PM) and Bactrim twice a day (6 AM and 6 PM) for 6 days, to be completed on 10/15/2019. You should also follow-up with your primary care provider within 2 weeks of discharge. PRINCIPAL DISCHARGE DIAGNOSIS  Diagnosis: Acute pulmonary embolism without acute cor pulmonale, unspecified pulmonary embolism type  Assessment and Plan of Treatment: You were diagnosed with a pulmonary embolism, a blood clot in your lungs. We found the clot on Cat scan of your lungs. You were started on a blood thinner called IV heparin to treat the blood clots. Because you were having episodes of vaginal bleeding, gynecology was consulted and they performed an ablation to stop the bleeding. You will continue with an oral version the blood thinner for treatment of the pulmonary embolism called Apixiban (Brand name Eliquis). Starting 10/10/19, you'll get a dose in the morning and you should take Eliquis 10mg (two 5mg tablets) in the evening, continue for twice a day for 6 days from 10/10/2019-10/15/2019. Then, on 10/16, you will switch to Eliquis 5mg (one 5mg tablet) twice a day for three months. You will follow with Dr. Morris (Pulmonologist) outpatient after discharge. You have an appointment on Monday, October 28th at 10:30 am. They will continue and make decisions on how long you should you should be on blood thinners. Dr. Morris's office number, if needed [phone]. You should also follow-up with your primary care provider within 1-2 weeks of discharge.      SECONDARY DISCHARGE DIAGNOSES  Diagnosis: Menorrhagia  Assessment and Plan of Treatment: You came in with menorrhagia and daily bleeding. You were found to have a low red blood cell level and you received a transfusion of blood. Gynecology was consulted and performed an ablation to stop the bleeding. Now that the bleeding has stopped, your anemia should resolve and you should follow up with your PCP after discharge. We stopped your oral birth control and you should follow-up with your gynecologist within 2 weeks of discharge to discuss the menorrhagia other birth control options should you need them.    Diagnosis: Cellulitis of right lower extremity  Assessment and Plan of Treatment: You came in with a cat bite on your leg. To treat bite wounds, you were started on antibiotics called flagyl and bactrim. You should continue to take these medications after discharge. Starting on 10/10/19, you will take the flagyl three times per day (recommend 6AM, 4PM, 10PM) and Bactrim twice a day (6 AM and 6 PM) for 6 days, to be completed on 10/15/2019. You should also follow-up with your primary care provider within 2 weeks of discharge.

## 2019-10-09 NOTE — PROGRESS NOTE ADULT - SUBJECTIVE AND OBJECTIVE BOX
GYN Progress Note    Patient seen at bedside this morning. No pain complaints. She is s/p D&C and hysteroscopic ablation performed yesterday, uncomplicated. OOB and voiding. Vaginal bleeding subsiding per patient. Denies CP, palpitations, SOB, fever, chills, nausea, vomiting.    Vital Signs Last 24 Hrs  T(C): 36.9 (09 Oct 2019 05:37), Max: 37.2 (08 Oct 2019 16:37)  T(F): 98.4 (09 Oct 2019 05:37), Max: 99 (08 Oct 2019 16:37)  HR: 77 (09 Oct 2019 05:37) (62 - 99)  BP: 112/73 (09 Oct 2019 05:37) (105/65 - 149/83)  RR: 17 (09 Oct 2019 05:37) (16 - 18)  SpO2: 99% (09 Oct 2019 05:37) (95% - 100%)    Physical Exam:  Gen: No Acute Distress  Pulm: Clear to auscultation bilaterally  GI: soft, appropriately nontender, nondistended, +BS, no rebound, no guarding.  Incision C/D/I  Ext: SCDs in place, wnl    I&O's Summary    08 Oct 2019 07:01  -  09 Oct 2019 07:00  --------------------------------------------------------  IN: 500 mL / OUT: 0 mL / NET: 500 mL      MEDICATIONS  (STANDING):  heparin  Infusion 1500 Unit(s)/Hr (15 mL/Hr) IV Continuous <Continuous>  influenza   Vaccine 0.5 milliLiter(s) IntraMuscular once  metroNIDAZOLE    Tablet 500 milliGRAM(s) Oral every 8 hours  trimethoprim  160 mG/sulfamethoxazole 800 mG 1 Tablet(s) Oral every 12 hours    MEDICATIONS  (PRN):      LABS:                        8.8    8.37  )-----------( 468      ( 09 Oct 2019 07:03 )             30.7     Hemoglobin: 8.8 g/dL (10-09 @ 07:03)  Hemoglobin: 9.0 g/dL (10-08 @ 06:40)  Hemoglobin: 8.7 g/dL (10-07 @ 22:48)  Hemoglobin: 8.5 g/dL (10-07 @ 08:05)  Hemoglobin: 9.3 g/dL (10-06 @ 20:30)    CBC Full  -  ( 09 Oct 2019 07:03 )  WBC Count : 8.37 K/uL  RBC Count : 4.26 M/uL  Hemoglobin : 8.8 g/dL  Hematocrit : 30.7 %  Platelet Count - Automated : 468 K/uL  Mean Cell Volume : 72.1 fl  Mean Cell Hemoglobin : 20.7 pg  Mean Cell Hemoglobin Concentration : 28.7 gm/dL    10-09    137  |  106  |  10  ----------------------------<  127<H>  5.0   |  21<L>  |  0.92    Ca    9.6      09 Oct 2019 07:03  Mg     1.8     10-09      Creatinine Trend: 0.92<--, 1.00<--, 0.85<--    PTT - ( 09 Oct 2019 07:03 )  PTT:42.9 sec

## 2019-10-09 NOTE — PROGRESS NOTE ADULT - PROBLEM SELECTOR PLAN 1
Patient with symptoms of shortness of breath and preceding LE swelling and trauma concerning for DVT, with confirmed PE (subsegmental and segmental) on CT imaging and no evidence of right heart strain. Patient also on OCPs for management of her menorrhagia which likely contributed to procoagulable state.   - given b/l and multiple PEs in addition to procoagulable state likely 2/2 OCP use, patient would benefit from AC, however, has risks of bleeding given active menorrhagia  - f/u Pulmonology recs  - EKG currently NSR without signs of heart strain  - ECHO: No acute valvular disease or ventricular dysfunction  - Bilateral LE ultrasound: No thrombosis  - monitor for chest pain, tachycardia, changes in vitals  - discharge on eliquis 10mg BID for 6 more days followed by eliquis 5mg BID for 3 months  - outpatient f/u with pulm (Dr. Morris)

## 2019-10-09 NOTE — PROGRESS NOTE ADULT - SUBJECTIVE AND OBJECTIVE BOX
*** NOTE IN PROGRESS ***    INTERVAL HPI/OVERNIGHT EVENTS:    SUBJECTIVE: Patient seen and examined at bedside.    OBJECTIVE:    VITAL SIGNS:  ICU Vital Signs Last 24 Hrs  T(C): 37.1 (09 Oct 2019 14:57), Max: 37.1 (09 Oct 2019 14:57)  T(F): 98.7 (09 Oct 2019 14:57), Max: 98.7 (09 Oct 2019 14:57)  HR: 87 (09 Oct 2019 14:57) (75 - 99)  BP: 128/67 (09 Oct 2019 14:57) (108/70 - 145/81)  BP(mean): --  ABP: --  ABP(mean): --  RR: 18 (09 Oct 2019 14:57) (17 - 18)  SpO2: 100% (09 Oct 2019 14:57) (95% - 100%)        10-08 @ 07:01  -  10-09 @ 07:00  --------------------------------------------------------  IN: 500 mL / OUT: 0 mL / NET: 500 mL      CAPILLARY BLOOD GLUCOSE          PHYSICAL EXAM:    Constitutional: NAD, resting comfortably in bed, answering questions, anxious  	HEENT: NC/AT, PERRL, EOMI, anicteric sclera, conjunctival pallor, no nasal discharge; uvula midline, no oropharyngeal erythema or exudates; MMM,     Neck: supple; no JVD or thyromegaly  	Respiratory: CTA B/L; no W/R/R, no retractions  	Cardiac: +S1/S2; RRR; mid systolic click  	Gastrointestinal: soft, NT/ND; no rebound or guarding; +BSx4  	Back: spine midline, no bony tenderness or step-offs; no CVAT B/L  	Extremities: WWP, no clubbing or cyanosis; no peripheral edema; 2 puncture wounds on the anterior aspect of the R shin 3 cm above the ankle with 2-3 mm of surrounding erythema and minimal tenderness without discharge, purulence or fluctuance  	Musculoskeletal: NROM x4; no joint swelling, tenderness or erythema  	Vascular: 2+ radial, femoral, DP/PT pulses B/L  	Dermatologic: skin warm, dry and intact; no rashes, wounds, or scars    Neurologic: AAOx3 , no focal deficits    MEDICATIONS:  MEDICATIONS  (STANDING):  apixaban 10 milliGRAM(s) Oral every 12 hours  influenza   Vaccine 0.5 milliLiter(s) IntraMuscular once  melatonin 5 milliGRAM(s) Oral at bedtime  metroNIDAZOLE    Tablet 500 milliGRAM(s) Oral every 8 hours  trimethoprim  160 mG/sulfamethoxazole 800 mG 1 Tablet(s) Oral every 12 hours    MEDICATIONS  (PRN):  metoclopramide Injectable 10 milliGRAM(s) IV Push every 6 hours PRN Nausea and/or Vomiting  ondansetron Injectable 4 milliGRAM(s) IV Push once PRN Postoperative Nausea and/or Vomiting      ALLERGIES:  Allergies    penicillin (Anaphylaxis)    Intolerances        LABS:                        8.8    8.37  )-----------( 468      ( 09 Oct 2019 07:03 )             30.7     10-09    137  |  106  |  10  ----------------------------<  127<H>  5.0   |  21<L>  |  0.92    Ca    9.6      09 Oct 2019 07:03  Mg     1.8     10-09      PTT - ( 09 Oct 2019 07:03 )  PTT:42.9 sec      RADIOLOGY & ADDITIONAL TESTS: Reviewed.

## 2019-10-09 NOTE — DISCHARGE NOTE PROVIDER - CARE PROVIDER_API CALL
Ghislaine Morris)  Critical Care Medicine; Pulmonary Disease  [address] [place] [address]  Phone: [phone]  Fax: [phone]  Follow Up Time: 2 weeks

## 2019-10-10 ENCOUNTER — TRANSCRIPTION ENCOUNTER (OUTPATIENT)
Age: 44
End: 2019-10-10

## 2019-10-10 VITALS
OXYGEN SATURATION: 100 % | HEART RATE: 65 BPM | DIASTOLIC BLOOD PRESSURE: 65 MMHG | TEMPERATURE: 98 F | RESPIRATION RATE: 17 BRPM | SYSTOLIC BLOOD PRESSURE: 103 MMHG

## 2019-10-10 PROBLEM — D64.9 ANEMIA, UNSPECIFIED: Chronic | Status: ACTIVE | Noted: 2019-10-06

## 2019-10-10 PROBLEM — I34.0 NONRHEUMATIC MITRAL (VALVE) INSUFFICIENCY: Chronic | Status: ACTIVE | Noted: 2019-10-06

## 2019-10-10 LAB — SURGICAL PATHOLOGY STUDY: SIGNIFICANT CHANGE UP

## 2019-10-10 PROCEDURE — 99239 HOSP IP/OBS DSCHRG MGMT >30: CPT

## 2019-10-10 RX ADMIN — APIXABAN 10 MILLIGRAM(S): 2.5 TABLET, FILM COATED ORAL at 09:24

## 2019-10-10 RX ADMIN — Medication 500 MILLIGRAM(S): at 05:41

## 2019-10-10 RX ADMIN — Medication 1 TABLET(S): at 11:04

## 2019-10-10 NOTE — DISCHARGE NOTE NURSING/CASE MANAGEMENT/SOCIAL WORK - PATIENT PORTAL LINK FT
You can access the FollowMyHealth Patient Portal offered by [facility] by registering at the following website: [email or web address]. By joining [employer]’s FollowMyHealth portal, you will also be able to view your health information using other applications (apps) compatible with our system.
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
## 2019-10-15 DIAGNOSIS — M19.90 UNSPECIFIED OSTEOARTHRITIS, UNSPECIFIED SITE: ICD-10-CM

## 2019-10-15 DIAGNOSIS — N84.0 POLYP OF CORPUS UTERI: ICD-10-CM

## 2019-10-15 DIAGNOSIS — I26.99 OTHER PULMONARY EMBOLISM WITHOUT ACUTE COR PULMONALE: ICD-10-CM

## 2019-10-15 DIAGNOSIS — N92.0 EXCESSIVE AND FREQUENT MENSTRUATION WITH REGULAR CYCLE: ICD-10-CM

## 2019-10-15 DIAGNOSIS — Z88.0 ALLERGY STATUS TO PENICILLIN: ICD-10-CM

## 2019-10-15 DIAGNOSIS — T38.4X5A ADVERSE EFFECT OF ORAL CONTRACEPTIVES, INITIAL ENCOUNTER: ICD-10-CM

## 2019-10-15 DIAGNOSIS — Z28.21 IMMUNIZATION NOT CARRIED OUT BECAUSE OF PATIENT REFUSAL: ICD-10-CM

## 2019-10-15 DIAGNOSIS — Z98.890 OTHER SPECIFIED POSTPROCEDURAL STATES: ICD-10-CM

## 2019-10-15 DIAGNOSIS — D25.0 SUBMUCOUS LEIOMYOMA OF UTERUS: ICD-10-CM

## 2019-10-15 DIAGNOSIS — L03.115 CELLULITIS OF RIGHT LOWER LIMB: ICD-10-CM

## 2019-10-15 DIAGNOSIS — E66.9 OBESITY, UNSPECIFIED: ICD-10-CM

## 2019-10-15 DIAGNOSIS — D50.0 IRON DEFICIENCY ANEMIA SECONDARY TO BLOOD LOSS (CHRONIC): ICD-10-CM

## 2019-10-15 DIAGNOSIS — S81.851A OPEN BITE, RIGHT LOWER LEG, INITIAL ENCOUNTER: ICD-10-CM

## 2019-10-15 DIAGNOSIS — N85.4 MALPOSITION OF UTERUS: ICD-10-CM

## 2019-10-16 RX ORDER — APIXABAN 2.5 MG/1
1 TABLET, FILM COATED ORAL
Qty: 60 | Refills: 3
Start: 2019-10-16 | End: 2020-02-12

## 2019-10-22 PROCEDURE — 76856 US EXAM PELVIC COMPLETE: CPT

## 2019-10-22 PROCEDURE — 73590 X-RAY EXAM OF LOWER LEG: CPT

## 2019-10-22 PROCEDURE — 95940 IONM IN OPERATNG ROOM 15 MIN: CPT

## 2019-10-22 PROCEDURE — 84484 ASSAY OF TROPONIN QUANT: CPT

## 2019-10-22 PROCEDURE — 71046 X-RAY EXAM CHEST 2 VIEWS: CPT

## 2019-10-22 PROCEDURE — 93005 ELECTROCARDIOGRAM TRACING: CPT

## 2019-10-22 PROCEDURE — 93306 TTE W/DOPPLER COMPLETE: CPT

## 2019-10-22 PROCEDURE — P9016: CPT

## 2019-10-22 PROCEDURE — 88305 TISSUE EXAM BY PATHOLOGIST: CPT

## 2019-10-22 PROCEDURE — 83880 ASSAY OF NATRIURETIC PEPTIDE: CPT

## 2019-10-22 PROCEDURE — 82550 ASSAY OF CK (CPK): CPT

## 2019-10-22 PROCEDURE — 85610 PROTHROMBIN TIME: CPT

## 2019-10-22 PROCEDURE — 85025 COMPLETE CBC W/AUTO DIFF WBC: CPT

## 2019-10-22 PROCEDURE — 81001 URINALYSIS AUTO W/SCOPE: CPT

## 2019-10-22 PROCEDURE — 36415 COLL VENOUS BLD VENIPUNCTURE: CPT

## 2019-10-22 PROCEDURE — 83735 ASSAY OF MAGNESIUM: CPT

## 2019-10-22 PROCEDURE — 87389 HIV-1 AG W/HIV-1&-2 AB AG IA: CPT

## 2019-10-22 PROCEDURE — 71275 CT ANGIOGRAPHY CHEST: CPT

## 2019-10-22 PROCEDURE — 85379 FIBRIN DEGRADATION QUANT: CPT

## 2019-10-22 PROCEDURE — 93970 EXTREMITY STUDY: CPT

## 2019-10-22 PROCEDURE — 86900 BLOOD TYPING SEROLOGIC ABO: CPT

## 2019-10-22 PROCEDURE — 86923 COMPATIBILITY TEST ELECTRIC: CPT

## 2019-10-22 PROCEDURE — 86901 BLOOD TYPING SEROLOGIC RH(D): CPT

## 2019-10-22 PROCEDURE — 85027 COMPLETE CBC AUTOMATED: CPT

## 2019-10-22 PROCEDURE — 81025 URINE PREGNANCY TEST: CPT

## 2019-10-22 PROCEDURE — 80307 DRUG TEST PRSMV CHEM ANLYZR: CPT

## 2019-10-22 PROCEDURE — 85730 THROMBOPLASTIN TIME PARTIAL: CPT

## 2019-10-22 PROCEDURE — 76830 TRANSVAGINAL US NON-OB: CPT

## 2019-10-22 PROCEDURE — 80048 BASIC METABOLIC PNL TOTAL CA: CPT

## 2019-10-22 PROCEDURE — 96374 THER/PROPH/DIAG INJ IV PUSH: CPT

## 2019-10-22 PROCEDURE — 86850 RBC ANTIBODY SCREEN: CPT

## 2019-10-22 PROCEDURE — 36430 TRANSFUSION BLD/BLD COMPNT: CPT

## 2019-10-22 PROCEDURE — 80053 COMPREHEN METABOLIC PANEL: CPT

## 2019-10-22 PROCEDURE — 99285 EMERGENCY DEPT VISIT HI MDM: CPT | Mod: 25

## 2019-10-30 ENCOUNTER — APPOINTMENT (OUTPATIENT)
Dept: PULMONOLOGY | Facility: CLINIC | Age: 44
End: 2019-10-30
Payer: COMMERCIAL

## 2019-10-30 VITALS
SYSTOLIC BLOOD PRESSURE: 110 MMHG | HEIGHT: 67 IN | BODY MASS INDEX: 32.96 KG/M2 | OXYGEN SATURATION: 99 % | HEART RATE: 110 BPM | DIASTOLIC BLOOD PRESSURE: 70 MMHG | WEIGHT: 210 LBS | TEMPERATURE: 98.4 F

## 2019-10-30 DIAGNOSIS — Z80.3 FAMILY HISTORY OF MALIGNANT NEOPLASM OF BREAST: ICD-10-CM

## 2019-10-30 DIAGNOSIS — Z78.9 OTHER SPECIFIED HEALTH STATUS: ICD-10-CM

## 2019-10-30 DIAGNOSIS — Z82.49 FAMILY HISTORY OF ISCHEMIC HEART DISEASE AND OTHER DISEASES OF THE CIRCULATORY SYSTEM: ICD-10-CM

## 2019-10-30 DIAGNOSIS — Z82.3 FAMILY HISTORY OF STROKE: ICD-10-CM

## 2019-10-30 PROCEDURE — 99214 OFFICE O/P EST MOD 30 MIN: CPT

## 2019-10-30 NOTE — PHYSICAL EXAM
[General Appearance - Well Developed] : well developed [Normal Appearance] : normal appearance [Well Groomed] : well groomed [General Appearance - Well Nourished] : well nourished [No Deformities] : no deformities [General Appearance - In No Acute Distress] : no acute distress [Normal Conjunctiva] : the conjunctiva exhibited no abnormalities [Eyelids - No Xanthelasma] : the eyelids demonstrated no xanthelasmas [Normal Oropharynx] : normal oropharynx [Neck Appearance] : the appearance of the neck was normal [Neck Cervical Mass (___cm)] : no neck mass was observed [Jugular Venous Distention Increased] : there was no jugular-venous distention [Thyroid Diffuse Enlargement] : the thyroid was not enlarged [Thyroid Nodule] : there were no palpable thyroid nodules [Heart Rate And Rhythm] : heart rate and rhythm were normal [Heart Sounds] : normal S1 and S2 [Murmurs] : no murmurs present [Respiration, Rhythm And Depth] : normal respiratory rhythm and effort [Exaggerated Use Of Accessory Muscles For Inspiration] : no accessory muscle use [Auscultation Breath Sounds / Voice Sounds] : lungs were clear to auscultation bilaterally [Abdomen Soft] : soft [Abdomen Tenderness] : non-tender [Abdomen Mass (___ Cm)] : no abdominal mass palpated [Abnormal Walk] : normal gait [Gait - Sufficient For Exercise Testing] : the gait was sufficient for exercise testing [Nail Clubbing] : no clubbing of the fingernails [Cyanosis, Localized] : no localized cyanosis [Petechial Hemorrhages (___cm)] : no petechial hemorrhages [Skin Color & Pigmentation] : normal skin color and pigmentation [Skin Turgor] : normal skin turgor [] : no rash [Deep Tendon Reflexes (DTR)] : deep tendon reflexes were 2+ and symmetric [Sensation] : the sensory exam was normal to light touch and pinprick [No Focal Deficits] : no focal deficits [Oriented To Time, Place, And Person] : oriented to person, place, and time [Impaired Insight] : insight and judgment were intact [Affect] : the affect was normal [II] : II

## 2019-10-31 NOTE — REVIEW OF SYSTEMS
[Negative] : Pulmonary Hypertension [Dyspnea] : dyspnea [Anxiety] : anxiety [Panic Attacks] : panic attacks [Difficulty Maintaining Sleep] : difficulty maintaining sleep [Snoring] : snoring [Cough] : no cough [Sputum] : not coughing up ~M sputum [Chest Tightness] : no chest tightness [Pleuritic Pain] : no pleuritic pain [Frequent URIs] : no frequent upper respiratory infections [Wheezing] : no wheezing [FreeTextEntry8] : MUÑOZ

## 2019-10-31 NOTE — PROCEDURE
[FreeTextEntry1] : \par \par EXAM: ECHOCARDIOGRAM (CARDIOL) \par \par PROCEDURE DATE: 10/07/2019 \par \par \par \par INTERPRETATION: REPORT: \par ----------------------------------- \par TRANSTHORACIC ECHOCARDIOGRAM REPORT \par \par \par ------------------------------------------------------------------------------ \par -- \par Patient Name: LAKHWINDER RAMOS Date of Exam: 10/7/2019 \par Medical Rec #: [id number] Height: 66.9 in \par Account #: [id number] Weight: 213.8 lb \par YOB: 1975 BSA: 2.08 mï¿½ \par Patient Age: 44 years BP: 125/60 mmHg \par Patient Gender: F Sonographer: Marcia Christy \par  Refering Physician: RIANNA MARQUEZ \par \par \par CPT: ECHO TTE WO CON COMP - 80854; - [id number] \par Indication(s): I26.99 - Other pulmonary embolism without acute cor pulmonale \par Procedure: A complete two-dimensional transthoracic echocardiogram was \par  performed: 2D, M-mode, spectral and color flow Doppler. \par \par Study Quality: Study quality was good. \par Study Comments: PULMONARY EMBOLISM \par  [id number] \par \par \par ------------------------------------------------------------------------------ \par -- \par CONCLUSIONS: \par \par  1. Normal left and right ventricular size and systolic function. \par  2. No significant valvular disease. \par  3. No pericardial effusion. \par \par ------------------------------------------------------------------------------ \par -- \par 2D AND M-MODE MEASUREMENTS (normal ranges within parentheses): \par \par Left Ventricle: Normal \par IVSd (2D): 0.82 cm (0.7-1.1) \par LVPWd (2D): 0.85 cm (0.7-1.1) \par LVIDd (2D): 5.61 cm (3.4-5.7) \par LVIDs (2D): 3.95 cm \par LV FS (2D): 29.6 % (>25%) \par LV EF (MOD BP): 54 % (>55%) \par \par LV DIASTOLIC FUNCTION: \par \par MV Peak E: 78.00 cm/s MV e' lat: 6.8 cm/s MV E/e' lat ratio: 11.4 \par MV Peak A: 83.40 cm/s MV e' med: 6.6 cm/s MV E/e' med ratio: 11.9 \par E/A Ratio: 0.94 MV e' av.7 cm/s Decel Time: 124 msec \par \par SPECTRAL DOPPLER ANALYSIS: \par \par Mitral Valve: \par MV Max Howard: MV P1/2 Time: 35.96 msec \par MV Mean Grad: MV Area, PHT: 6.12 cmï¿½ \par \par \par Aortic Valve: AoV Max Howard: AoV Peak PG: AoV Mean PG: \par  2.0 mmHg \par LVOT Vmax: 0.81 m/s LVOT VTI: 0.173 m LVOT Diameter: 2.10 cm \par AoV Area, VMax: AoV Area, VTI: 2.94 cmï¿½ AoV Area, Vmn: 2.70 cmï¿½ \par \par Tricuspid Valve and PA/RV Systolic Pressure: TR Max Velocity: 2.86 m/s RA \par Pressure: 3 mmHg RVSP/PASP: 35.7 mmHg \par \par \par ------------------------------------------------------------------------------ \par -- \par FINDINGS: \par \par Left Ventricle: \par The left ventricle is normal in size, wall thickness, and systolic function \par with a calculated ejection fraction of 54.0 %. Probably normal left \par ventricular wall motion. There is normal left ventricular diastolic function \par and filling pressure. \par \par Right Ventricle: \par The right ventricle is normal in size and systolic function. \par \par Left Atrium: \par The left atrium is normal in size. \par \par Right Atrium: \par The right atrium is normal in size. \par \par Aortic Valve: \par The aortic valve is tricuspid with normal structure and function without \par stenosis. There is no aortic regurgitation. \par \par Pulmonic Valve: \par Structurally normal pulmonic valve with normal leaflet excursion. There is \par trace pulmonic regurgitation. \par \par Mitral Valve: \par Structurally normal mitral valve with normal leaflet excursion. There is \par trace mitral regurgitation. \par \par Tricuspid Valve: \par Structurally normal tricuspid valve with normal leaflet excursion. There is \par mild tricuspid regurgitation. Pulmonary artery systolic pressure (estimated \par using the tricuspid regurgitant gradient and an estimate of right atrial \par pressure) is 35.7 mmHg. \par \par Aorta: \par The aortic root is normal in size and structure. \par \par Venous: \par The inferior vena cava is normal in size (<2.1cm) with normal inspiratory \par collapse (>50%) consistent with normal right atrial pressure ( \par 3, range 0-5mmHg). \par \par Pericardium: \par No pericardial effusion is seen. \par \par Dr. Moinca Finney MD. \par \par Electronically signed by Dr. Monica Finney MD. \par Signature Date/Time: 10/7/2019/1:57:53 PM \par \par \par \par *** Final *** \par \par \par \par \par \par \par \par \par "Thank you for the opportunity to participate in the care of this patient." \par \par \par \par MONICA FINNEY \par This document has been electronically signed. Oct 7 2019 11:19AM \par \par \par \par \par \par \par EXAM: CT ANGIO CHEST PE PROTOCOL IC \par \par PROCEDURE DATE: 10/05/2019 \par \par \par \par INTERPRETATION: CTA (CT angiography) of the CHEST dated 10/5/2019 11:09 PM \par \par INDICATION: Shortness of breath, elevated d-dimer. Assess for pulmonary \par embolism. \par \par TECHNIQUE: CT angiography of the chest was performed during bolus injection \par of intravenous contrast. Post-processing including the production of axial, \par coronal and sagittal multiplanar reformatted images and axial and coronal \par maximum intensity projections (MIPs) was performed. \par \par PRIOR STUDY: None. \par \par FINDINGS: Evaluation for pulmonary embolism is somewhat limited due to \par respiratory motion artifact. There are however definite filling defects \par within several subsegmental branches of the right lower lobe consistent with \par pulmonary emboli. There is also a filling defect at the segmental \par bifurcation of the right middle and lower lobes (series 2 image 69) which \par extends slightly into a right middle lobe segmental pulmonary artery. There \par are probable emboli in a few posterior left lower lobe subsegmental \par pulmonary arteries, although evaluation is limited due to respiratory motion \par artifact. The pulmonary trunk is normal caliber. No evidence of right heart \par strain is appreciated. The thoracic aorta is normal in appearance. The heart \par is mildly enlarged. No pericardial effusion is seen. No mediastinal, hilar \par or axillary lymphadenopathy is seen. \par \par No pleural effusions are seen. No consolidations are seen. A few left \par calcified granulomas are noted. Multiple small bullae in the medial right \par upper lobe are noted. \par \par Limited evaluation of the upper abdomen demonstrates a questionable tiny \par hiatal hernia. There is mild nonspecific prominence of the visualized main \par pancreatic duct. \par \par Evaluation of the osseous structures demonstrates no significant abnormality. \par \par \par IMPRESSION: \par Pulmonary emboli within the segmental/subsegmental branches of the right \par middle and lower lobes and possibly in the subsegmental branches of the left \par lower lobe. No CT evidence of right heart strain or pulmonary infarction. \par \par Discussed with Dr. Christianson at 1:33 AM on 10/6/2019. \par \par \par \par \par \par Thank you for the opportunity to participate in the care of this patient. \par \par LUZ MARIA MARINO M.D., RADIOLOGY RESIDENT \par This document has been electronically signed. \par TULIO MARTINEZ M.D., ATTENDING RADIOLOGIST \par This document has been electronically signed. Oct 6 2019 1:43AM \par \par \par \par \par

## 2019-10-31 NOTE — ASSESSMENT
[FreeTextEntry1] : Pulmonary Emboli  [provoked]\par - Patient tolerating Eliquis well. Patient to continue Eliquis 5 mg BID x minimum of 3 months. Patient to remain off OCP & recommended to follow with OB-GYN to discuss other forms of contraceptives. Discussed etiology of pulmonary emboli, as well as treatment & prognosis. Answered all questions to patient's apparent satisfaction.\par - Will repeat stress echo in 3 months. May repeat CT chest at this time to ensure total clot resolution. \par - Will send for home sleep study to rule out ISIDRA as could be risk factor for PE \par - Advised patient she can start exercising as tolerated as long as she takes it slow and avoids any strenuous activity. Cautioned patient with biking given rx of falling. We discussed the benefits of yoga with weight loss & stress. Advised daily walking. \par \par Health Maintenance\par - UTD w/ mammogram. Declines flu vaccine despite education \par \par RTC in 3 months

## 2019-10-31 NOTE — HISTORY OF PRESENT ILLNESS
[Improved] : have improved [Difficulty Breathing During Exertion] : improved dyspnea on exertion [Feelings Of Weakness On Exertion] : improved exercise intolerance [Cough] : denies coughing [Wheezing] : denies wheezing [Regional Soft Tissue Swelling Both Lower Extremities] : denies lower extremity edema [Chest Pain Or Discomfort] : denies chest pain [Snoring] : snoring [FreeTextEntry1] : 44 year old female with PMHx of fibroids and MVP presents to our clinic post-hospitalization at [facility] 10/6-10/10 where she originally presented with complaints of a cat bite to RLE as well as 1 week of shortness of breath which she attributed to "life" stress/panic attacks at the time. States the SOB got progressively worse and she started to feel dizzy and then went to ER. Patient later diagnosed with bilateral pulmonary emboli noted on CTA, presumed to be secondary to being on OCP. Patient discontinued OCP & started on heparin drip during hospitalization and discharged on Eliquis 10 mg BID x 6 days.\par \par \par LE Doppler done at hospital negative for LE DVT. \par Echo & CTA negative for right heart strain. ECHO with PASP 37.5. \par \par Today, patient reports feeling better but does still feel SOB at times which she thinks could be due to anxiety/stress. She can walk about 5-10 blocks before feeling SOB which is an improvement from pre-hospitalization. Takes Eliquis daily and tolerating well- denies bloody urine, stool, nose, and/or gums. No coughing up blood. States she was on birth control for less than a month total. Of note, during hospitalization she had endometrial polyp curettage done- pathology benign.\par \par No coughing, wheezing, fever, chills, chest pain. No LE swelling or pain. \par \par Does report snoring at night as well as feeling sleepy during the daytime. \par

## 2020-02-04 ENCOUNTER — APPOINTMENT (OUTPATIENT)
Dept: PULMONOLOGY | Facility: CLINIC | Age: 45
End: 2020-02-04

## 2020-02-25 ENCOUNTER — APPOINTMENT (OUTPATIENT)
Dept: INTERNAL MEDICINE | Facility: CLINIC | Age: 45
End: 2020-02-25
Payer: COMMERCIAL

## 2020-02-25 VITALS — HEART RATE: 86 BPM | DIASTOLIC BLOOD PRESSURE: 79 MMHG | SYSTOLIC BLOOD PRESSURE: 127 MMHG

## 2020-02-25 VITALS
WEIGHT: 224 LBS | HEART RATE: 86 BPM | SYSTOLIC BLOOD PRESSURE: 138 MMHG | BODY MASS INDEX: 35.16 KG/M2 | HEIGHT: 67 IN | DIASTOLIC BLOOD PRESSURE: 89 MMHG

## 2020-02-25 PROCEDURE — 99214 OFFICE O/P EST MOD 30 MIN: CPT | Mod: 25

## 2020-02-25 PROCEDURE — 36415 COLL VENOUS BLD VENIPUNCTURE: CPT

## 2020-02-25 RX ORDER — VITAMIN B COMPLEX
CAPSULE ORAL
Refills: 0 | Status: ACTIVE | COMMUNITY

## 2020-02-25 NOTE — PLAN
[FreeTextEntry1] : venipuncture performed by me in my exam room\par Further management and instructions to patient will follow results of pending studies.\par

## 2020-02-25 NOTE — HISTORY OF PRESENT ILLNESS
[FreeTextEntry1] : Initial Internal Medicine evaluation at [facility] to establish care\par  [de-identified] : 45-year-old describes a sequence of medical events. At baseline.She had heavy monthly menses. In 2017 she underwent a vaginal procedure to eliminate this. However, the periods became heavier, BCP started but then became very SOB and went to [facility] 10.6.19: hgb5 (trf 2uprbc), CTA + bilat pul emboli (neg DVT Doppler) attrib to BCP given, and endometrial polyp identified, d&c followed and since then no bleeding. Taking OTC liquid iron 1/d . Took Eliquis  5 ZBID x 3 mo and then it was stopped. Has resumeds normal activities and work and feels back to baseline . Notes bilat leg swelling - increases as day progressed. 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

## 2020-02-25 NOTE — HEALTH RISK ASSESSMENT
[Good] : ~his/her~  mood as  good [1 or 2 (0 pts)] : 1 or 2 (0 points) [2 - 4 times a month (2 pts)] : 2-4 times a month (2 points) [Never (0 pts)] : Never (0 points) [No] : In the past 12 months have you used drugs other than those required for medical reasons? No [No falls in past year] : Patient reported no falls in the past year [0] : 1) Little interest or pleasure doing things: Not at all (0) [Patient reported mammogram was normal] : Patient reported mammogram was normal [Patient reported PAP Smear was normal] : Patient reported PAP Smear was normal [HIV Test offered] : HIV Test offered [Patient reported colonoscopy was normal] : Patient reported colonoscopy was normal [Hepatitis C test offered] : Hepatitis C test offered [None] : None [Alone] : lives alone [# Of Children ___] : has [unfilled] children [Employed] : employed [Fully functional (bathing, dressing, toileting, transferring, walking, feeding)] : Fully functional (bathing, dressing, toileting, transferring, walking, feeding) [Feels Safe at Home] : Feels safe at home [Reports normal functional visual acuity (ie: able to read med bottle)] : Reports normal functional visual acuity [] : No [de-identified] : Pul, Gyn [Audit-CScore] : 2 [de-identified] : no [[id number]] : 0 [de-identified] : trino [Change in mental status noted] : No change in mental status noted [Language] : denies difficulty with language [Handling Complex Tasks] : denies difficulty handling complex tasks [Reports changes in hearing] : Reports no changes in hearing [Reports changes in vision] : Reports no changes in vision [MammogramDate] : 09/19 [Reports changes in dental health] : Reports no changes in dental health [MammogramComments] : R [PapSmearDate] : 09/19 [PapSmearComments] : Dr. Naranjo [FreeTextEntry2] : [employer] [ColonoscopyDate] : 01/08

## 2020-02-25 NOTE — PHYSICAL EXAM
[Normal Sclera/Conjunctiva] : normal sclera/conjunctiva [Normal Outer Ear/Nose] : the outer ears and nose were normal in appearance [Normal Oropharynx] : the oropharynx was normal [Normal TMs] : both tympanic membranes were normal [No Respiratory Distress] : no respiratory distress  [Clear to Auscultation] : lungs were clear to auscultation bilaterally [No Carotid Bruits] : no carotid bruits [Soft] : abdomen soft [Non Tender] : non-tender [No HSM] : no HSM [No Spinal Tenderness] : no spinal tenderness [Grossly Normal Strength/Tone] : grossly normal strength/tone [No Skin Lesions] : no skin lesions [No Focal Deficits] : no focal deficits [Normal] : affect was normal and insight and judgment were intact [de-identified] : Mallampati 4; large tonsils [de-identified] : "stuffed" but well w/o dyspnea [de-identified] : No adenopathy; thyroid not palpable. [de-identified] : bilat tr-1+pretib edema with ankles clear 2/2 tight socks

## 2020-03-01 LAB
ALBUMIN SERPL ELPH-MCNC: 4.4 G/DL
ALP BLD-CCNC: 83 U/L
ALT SERPL-CCNC: 21 U/L
ANION GAP SERPL CALC-SCNC: 13 MMOL/L
APPEARANCE: CLEAR
AST SERPL-CCNC: 19 U/L
BACTERIA: NEGATIVE
BASOPHILS # BLD AUTO: 0.05 K/UL
BASOPHILS NFR BLD AUTO: 1 %
BILIRUB SERPL-MCNC: <0.2 MG/DL
BILIRUBIN URINE: NEGATIVE
BLOOD URINE: NEGATIVE
BUN SERPL-MCNC: 16 MG/DL
CALCIUM SERPL-MCNC: 9.7 MG/DL
CHLORIDE SERPL-SCNC: 103 MMOL/L
CHOLEST SERPL-MCNC: 202 MG/DL
CHOLEST/HDLC SERPL: 3.3 RATIO
CK SERPL-CCNC: 174 U/L
CO2 SERPL-SCNC: 21 MMOL/L
COLOR: NORMAL
CREAT SERPL-MCNC: 0.87 MG/DL
EOSINOPHIL # BLD AUTO: 0.19 K/UL
EOSINOPHIL NFR BLD AUTO: 3.6 %
ESTIMATED AVERAGE GLUCOSE: 128 MG/DL
FERRITIN SERPL-MCNC: 8 NG/ML
FOLATE SERPL-MCNC: 11.5 NG/ML
GLUCOSE QUALITATIVE U: NEGATIVE
GLUCOSE SERPL-MCNC: 95 MG/DL
HBA1C MFR BLD HPLC: 6.1 %
HBV CORE IGG+IGM SER QL: NONREACTIVE
HBV SURFACE AB SER QL: NONREACTIVE
HCT VFR BLD CALC: 39.1 %
HCV AB SER QL: NONREACTIVE
HCV S/CO RATIO: 0.15 S/CO
HDLC SERPL-MCNC: 62 MG/DL
HGB BLD-MCNC: 11.7 G/DL
HIV1+2 AB SPEC QL IA.RAPID: NONREACTIVE
HYALINE CASTS: 0 /LPF
IMM GRANULOCYTES NFR BLD AUTO: 0.2 %
IRON SATN MFR SERPL: 9 %
IRON SERPL-MCNC: 40 UG/DL
KETONES URINE: NEGATIVE
LDLC SERPL CALC-MCNC: 120 MG/DL
LEUKOCYTE ESTERASE URINE: NEGATIVE
LYMPHOCYTES # BLD AUTO: 2.13 K/UL
LYMPHOCYTES NFR BLD AUTO: 40.7 %
MAN DIFF?: NORMAL
MCHC RBC-ENTMCNC: 23.9 PG
MCHC RBC-ENTMCNC: 29.9 GM/DL
MCV RBC AUTO: 80 FL
MICROSCOPIC-UA: NORMAL
MONOCYTES # BLD AUTO: 0.34 K/UL
MONOCYTES NFR BLD AUTO: 6.5 %
NEUTROPHILS # BLD AUTO: 2.51 K/UL
NEUTROPHILS NFR BLD AUTO: 48 %
NITRITE URINE: NEGATIVE
PH URINE: 7
PLATELET # BLD AUTO: 369 K/UL
POTASSIUM SERPL-SCNC: 4.3 MMOL/L
PROT SERPL-MCNC: 7.3 G/DL
PROTEIN URINE: NEGATIVE
RBC # BLD: 4.89 M/UL
RBC # FLD: 18.4 %
RED BLOOD CELLS URINE: 3 /HPF
SODIUM SERPL-SCNC: 137 MMOL/L
SPECIFIC GRAVITY URINE: 1.02
SQUAMOUS EPITHELIAL CELLS: 1 /HPF
T PALLIDUM AB SER QL IA: NEGATIVE
T4 FREE SERPL-MCNC: 1 NG/DL
TIBC SERPL-MCNC: 451 UG/DL
TRIGL SERPL-MCNC: 103 MG/DL
TSH SERPL-ACNC: 1.37 UIU/ML
UIBC SERPL-MCNC: 411 UG/DL
URATE SERPL-MCNC: 5.6 MG/DL
UROBILINOGEN URINE: NORMAL
VIT B12 SERPL-MCNC: 363 PG/ML
WBC # FLD AUTO: 5.23 K/UL
WHITE BLOOD CELLS URINE: 1 /HPF

## 2020-03-31 ENCOUNTER — APPOINTMENT (OUTPATIENT)
Dept: PULMONOLOGY | Facility: CLINIC | Age: 45
End: 2020-03-31

## 2020-05-07 ENCOUNTER — APPOINTMENT (OUTPATIENT)
Dept: HEMATOLOGY ONCOLOGY | Facility: CLINIC | Age: 45
End: 2020-05-07
Payer: COMMERCIAL

## 2020-05-07 PROCEDURE — 99205 OFFICE O/P NEW HI 60 MIN: CPT | Mod: 95

## 2020-05-07 NOTE — HISTORY OF PRESENT ILLNESS
[Home] : at home, [unfilled] , at the time of the visit. [Medical Office: ([facility])___] : at the medical office located in  [Patient] : the patient [Self] : self [de-identified] : 45-year old -American female had her first episode of pulmonary emboli in October 2019 and has been placed on Eliquis since.  She admits to starting hormone replacement therapy 2 weeks prior to her episode of pulmonary emboli... She denies any other risk factors for thrombosis... She has fibroids and since she has been on Eliquis she has been bleeding more and developed slight iron deficiency anemia... She has not been taking oral iron... Patient requested this appointment which was rescheduled multiple times... To discuss cessation of anticoagulation therapy... Patient lost her father to intracranial bleed, after he had portal vein thrombosis from his chronic liver disease...\par \par She saw Dr. Morris for her pulmonary emboli in the past.

## 2020-05-07 NOTE — ASSESSMENT
[FreeTextEntry1] : Discussed with patient the need to continue anticoagulation with Eliquis at present time.  I plan to discuss her case with Dr. Morris and see if he would like to repeat the CTA prior to DC AC...\par \par Patient was also advised to take oral iron daily to improve for her iron deficiency anemia...\par \par Patient states she would have like to have repeat blood work prior to DC the Eliquis...

## 2020-05-07 NOTE — REASON FOR VISIT
[Initial Consultation] : an initial consultation for [FreeTextEntry2] : Length of anticoagulation therapy

## 2020-05-14 ENCOUNTER — APPOINTMENT (OUTPATIENT)
Dept: PULMONOLOGY | Facility: CLINIC | Age: 45
End: 2020-05-14

## 2020-05-15 ENCOUNTER — APPOINTMENT (OUTPATIENT)
Dept: PULMONOLOGY | Facility: CLINIC | Age: 45
End: 2020-05-15

## 2020-05-17 ENCOUNTER — EMERGENCY (EMERGENCY)
Facility: HOSPITAL | Age: 45
LOS: 1 days | Discharge: HOME | End: 2020-05-17
Attending: EMERGENCY MEDICINE | Admitting: EMERGENCY MEDICINE
Payer: COMMERCIAL

## 2020-05-17 VITALS
OXYGEN SATURATION: 100 % | TEMPERATURE: 98 F | DIASTOLIC BLOOD PRESSURE: 98 MMHG | RESPIRATION RATE: 18 BRPM | SYSTOLIC BLOOD PRESSURE: 152 MMHG | HEART RATE: 80 BPM | WEIGHT: 214.95 LBS

## 2020-05-17 VITALS
RESPIRATION RATE: 18 BRPM | OXYGEN SATURATION: 100 % | SYSTOLIC BLOOD PRESSURE: 136 MMHG | TEMPERATURE: 98 F | HEART RATE: 73 BPM | DIASTOLIC BLOOD PRESSURE: 65 MMHG

## 2020-05-17 DIAGNOSIS — Z98.890 OTHER SPECIFIED POSTPROCEDURAL STATES: Chronic | ICD-10-CM

## 2020-05-17 DIAGNOSIS — R20.2 PARESTHESIA OF SKIN: ICD-10-CM

## 2020-05-17 DIAGNOSIS — R20.0 ANESTHESIA OF SKIN: ICD-10-CM

## 2020-05-17 DIAGNOSIS — Z88.0 ALLERGY STATUS TO PENICILLIN: ICD-10-CM

## 2020-05-17 LAB
ALBUMIN SERPL ELPH-MCNC: 4.2 G/DL — SIGNIFICANT CHANGE UP (ref 3.3–5)
ALP SERPL-CCNC: 68 U/L — SIGNIFICANT CHANGE UP (ref 40–120)
ALT FLD-CCNC: 15 U/L — SIGNIFICANT CHANGE UP (ref 10–45)
ANION GAP SERPL CALC-SCNC: 12 MMOL/L — SIGNIFICANT CHANGE UP (ref 5–17)
AST SERPL-CCNC: 20 U/L — SIGNIFICANT CHANGE UP (ref 10–40)
BILIRUB SERPL-MCNC: 0.3 MG/DL — SIGNIFICANT CHANGE UP (ref 0.2–1.2)
BUN SERPL-MCNC: 13 MG/DL — SIGNIFICANT CHANGE UP (ref 7–23)
CALCIUM SERPL-MCNC: 8.9 MG/DL — SIGNIFICANT CHANGE UP (ref 8.4–10.5)
CHLORIDE SERPL-SCNC: 104 MMOL/L — SIGNIFICANT CHANGE UP (ref 96–108)
CO2 SERPL-SCNC: 23 MMOL/L — SIGNIFICANT CHANGE UP (ref 22–31)
CREAT SERPL-MCNC: 0.96 MG/DL — SIGNIFICANT CHANGE UP (ref 0.5–1.3)
GLUCOSE SERPL-MCNC: 97 MG/DL — SIGNIFICANT CHANGE UP (ref 70–99)
HCG SERPL-ACNC: <0 MIU/ML — SIGNIFICANT CHANGE UP
HCT VFR BLD CALC: 40.9 % — SIGNIFICANT CHANGE UP (ref 34.5–45)
HGB BLD-MCNC: 12.8 G/DL — SIGNIFICANT CHANGE UP (ref 11.5–15.5)
MCHC RBC-ENTMCNC: 26.3 PG — LOW (ref 27–34)
MCHC RBC-ENTMCNC: 31.3 GM/DL — LOW (ref 32–36)
MCV RBC AUTO: 84.2 FL — SIGNIFICANT CHANGE UP (ref 80–100)
NRBC # BLD: 0 /100 WBCS — SIGNIFICANT CHANGE UP (ref 0–0)
PLATELET # BLD AUTO: 357 K/UL — SIGNIFICANT CHANGE UP (ref 150–400)
POTASSIUM SERPL-MCNC: 3.9 MMOL/L — SIGNIFICANT CHANGE UP (ref 3.5–5.3)
POTASSIUM SERPL-SCNC: 3.9 MMOL/L — SIGNIFICANT CHANGE UP (ref 3.5–5.3)
PROT SERPL-MCNC: 7.3 G/DL — SIGNIFICANT CHANGE UP (ref 6–8.3)
RBC # BLD: 4.86 M/UL — SIGNIFICANT CHANGE UP (ref 3.8–5.2)
RBC # FLD: 17.6 % — HIGH (ref 10.3–14.5)
SODIUM SERPL-SCNC: 139 MMOL/L — SIGNIFICANT CHANGE UP (ref 135–145)
WBC # BLD: 4.62 K/UL — SIGNIFICANT CHANGE UP (ref 3.8–10.5)
WBC # FLD AUTO: 4.62 K/UL — SIGNIFICANT CHANGE UP (ref 3.8–10.5)

## 2020-05-17 PROCEDURE — 85027 COMPLETE CBC AUTOMATED: CPT

## 2020-05-17 PROCEDURE — 70450 CT HEAD/BRAIN W/O DYE: CPT | Mod: 26

## 2020-05-17 PROCEDURE — 82962 GLUCOSE BLOOD TEST: CPT

## 2020-05-17 PROCEDURE — 96360 HYDRATION IV INFUSION INIT: CPT

## 2020-05-17 PROCEDURE — 93010 ELECTROCARDIOGRAM REPORT: CPT

## 2020-05-17 PROCEDURE — 99284 EMERGENCY DEPT VISIT MOD MDM: CPT | Mod: 25

## 2020-05-17 PROCEDURE — 99285 EMERGENCY DEPT VISIT HI MDM: CPT

## 2020-05-17 PROCEDURE — 93005 ELECTROCARDIOGRAM TRACING: CPT

## 2020-05-17 PROCEDURE — 70450 CT HEAD/BRAIN W/O DYE: CPT

## 2020-05-17 PROCEDURE — 80053 COMPREHEN METABOLIC PANEL: CPT

## 2020-05-17 PROCEDURE — 36415 COLL VENOUS BLD VENIPUNCTURE: CPT

## 2020-05-17 PROCEDURE — 96361 HYDRATE IV INFUSION ADD-ON: CPT

## 2020-05-17 PROCEDURE — 84702 CHORIONIC GONADOTROPIN TEST: CPT

## 2020-05-17 RX ORDER — SODIUM CHLORIDE 9 MG/ML
1000 INJECTION INTRAMUSCULAR; INTRAVENOUS; SUBCUTANEOUS ONCE
Refills: 0 | Status: COMPLETED | OUTPATIENT
Start: 2020-05-17 | End: 2020-05-17

## 2020-05-17 RX ADMIN — SODIUM CHLORIDE 1000 MILLILITER(S): 9 INJECTION INTRAMUSCULAR; INTRAVENOUS; SUBCUTANEOUS at 19:57

## 2020-05-17 RX ADMIN — Medication 0.5 MILLIGRAM(S): at 19:53

## 2020-05-17 RX ADMIN — Medication 0.5 MILLIGRAM(S): at 21:49

## 2020-05-17 RX ADMIN — SODIUM CHLORIDE 1000 MILLILITER(S): 9 INJECTION INTRAMUSCULAR; INTRAVENOUS; SUBCUTANEOUS at 21:49

## 2020-05-17 NOTE — ED PROVIDER NOTE - PATIENT PORTAL LINK FT
You can access the FollowMyHealth Patient Portal offered by [facility] by registering at the following website: [email or web address]. By joining [facility]’s FollowMyHealth portal, you will also be able to view your health information using other applications (apps) compatible with our system.

## 2020-05-17 NOTE — ED PROVIDER NOTE - NSFOLLOWUPINSTRUCTIONS_ED_ALL_ED_FT
Immediately return to the Emergency Department or call 911 for any high fever, trouble breathing, severe vomiting, weakness on one side of your body, worsening pain, or any other concerns.    You were evaluated for acute cardiac emergency. You had an ekg, which did not show an emergency heart problem. You had a CT of your head, which did not show any evidence of stroke or acute emergency.     You must follow up with your primary care physician.

## 2020-05-17 NOTE — ED ADULT NURSE NOTE - OBJECTIVE STATEMENT
44 yo female presents to ED with complaints of palpitations and tingling to face, hands and lips for the past two hours. Patient is on Eliquis for PE diagnosed in .. 44 yo female currently on Eliquis for PE presents to ED with complaints of palpitations and tingling to left face and LUE for the past two hours. Patient states she think she "might be having a panic attack". Prior to symptoms patient was finalizing her father's will after his death. Patient then began to feel worse after goggling her symptoms in the car. Denies current palpitation. Denies current feeling of tingling. Patient denies chest pain/ SOB.

## 2020-05-17 NOTE — ED PROVIDER NOTE - CLINICAL SUMMARY MEDICAL DECISION MAKING FREE TEXT BOX
45F pmh fibroids, PE, anemia p/w perioral numbness, palpitations, then L face tingling, then LUE tingling/pins/needles. Pt had been just finalizing her father's will after his death. Felt worse when googled her symptoms in the car. states here that she thinks she is having a panic attack, but denies ongoing tx for panic/anxiety.   Taking all medications as directed, including AC, denies smoking, OCP (is aware likely led to her PE). Exam w/o focal deficit, no sensation change. Concern anxiety, panic attack, doubt stroke, TIA. Pt improving w/ reassurance during initial interview. 45F pmh fibroids, PE, anemia p/w perioral numbness, palpitations, then L face tingling, then LUE tingling/pins/needles. Pt had been just finalizing her father's will after his death. Felt worse when googled her symptoms in the car. states here that she thinks she is having a panic attack, but denies ongoing tx for panic/anxiety. Taking all medications as directed, including AC, denies smoking, OCP (is aware likely led to her PE). Exam w/o focal deficit, no sensation change. Concern anxiety, panic attack, doubt stroke, TIA. Pt improving w/ reassurance during initial interview. Imaging w/o acute finding. Feeling much improved, no acute life threatening illness. Pt seeking discharge.

## 2020-05-17 NOTE — ED PROVIDER NOTE - OBJECTIVE STATEMENT
45F pmh fibroids, PE, anemia p/w perioral numbness, palpitations, then L face tingling, then LUE tingling/pins/needles. Pt had been just finalizing her father's will after his death. Felt worse when googled her symptoms in the car. states here that she thinks she is having a panic attack, but denies ongoing tx for panic/anxiety.   Taking all medications as directed, including AC, denies smoking, OCP (is aware likely led to her PE).

## 2020-05-17 NOTE — ED PROVIDER NOTE - PHYSICAL EXAMINATION
VITAL SIGNS: I have reviewed nursing notes and confirm.  CONSTITUTIONAL: Well-developed; well-nourished; in min distress.  SKIN: Skin is warm and dry, no acute rash.  HEAD: Normocephalic; atraumatic.  EYES:  EOM intact; conjunctiva and sclera clear.  ENT: No nasal discharge; airway clear.  NECK: Supple; Voluntary FROM  CARD: No rubs appreciated, Regular rate and rhythm.  RESP: No wheezes, no rales. No respiratory distress  ABD: Soft; non-distended; non-tender; no rebound or guarding  EXT: Normal ROM. No cyanosis or edema.  NEURO: Patient is alert, oriented x person, place and time.  Cranial nerves 2-12 are intact.  Normal gait and speech.  Cerebellar testing normal:  negative Romberg, normal coordination and normal finger to nose, heal to shin and rapid alternating movements.  Normal proprioception and sensory exam.  No pronator drift.  5/5 bl upper extremity and lower extremity strength.   PSYCH: anxious but responding to self-calming attempts, more relaxed after discussing the sx and recent home stress/covid/paternal death.

## 2020-05-19 ENCOUNTER — APPOINTMENT (OUTPATIENT)
Dept: PULMONOLOGY | Facility: CLINIC | Age: 45
End: 2020-05-19

## 2020-05-26 ENCOUNTER — APPOINTMENT (OUTPATIENT)
Dept: PULMONOLOGY | Facility: CLINIC | Age: 45
End: 2020-05-26
Payer: COMMERCIAL

## 2020-05-26 PROCEDURE — 99213 OFFICE O/P EST LOW 20 MIN: CPT | Mod: 95

## 2020-05-27 NOTE — ASSESSMENT
[FreeTextEntry1] : Pulmonary Emboli  [provoked]\par - Patient tolerating Eliquis well. Patient to continue Eliquis 5 mg BID for 6 months.Patient to remain off OCP & recommended to follow with OB-GYN to discuss other forms of contraceptives. Discussed etiology of pulmonary emboli, as well as treatment & prognosis. Answered all questions to patient's apparent satisfaction.\par - Discussed with pt due to provoked PE she can stop anticoagulation, however feels more comfortable moving forward with VQ scan prior to ensure there is no signs of chronic clots.  (ordered VQ scan)\par - Pt is followed by Dr. Payne for thrombolic workup\par \par

## 2020-05-27 NOTE — PROCEDURE
[FreeTextEntry1] : \par \par EXAM: ECHOCARDIOGRAM (CARDIOL) \par \par PROCEDURE DATE: 10/07/2019 \par \par \par \par INTERPRETATION: REPORT: \par ----------------------------------- \par TRANSTHORACIC ECHOCARDIOGRAM REPORT \par \par \par ------------------------------------------------------------------------------ \par -- \par Patient Name: LAKHWINDER RAMOS Date of Exam: 10/7/2019 \par Medical Rec #: [id number] Height: 66.9 in \par Account #: [id number] Weight: 213.8 lb \par YOB: 1975 BSA: 2.08 mï¿½ \par Patient Age: 44 years BP: 125/60 mmHg \par Patient Gender: F Sonographer: Marcia Christy \par  Refering Physician: RIANNA MARQUEZ \par \par \par CPT: ECHO TTE WO CON COMP - 34857; - [id number] \par Indication(s): I26.99 - Other pulmonary embolism without acute cor pulmonale \par Procedure: A complete two-dimensional transthoracic echocardiogram was \par  performed: 2D, M-mode, spectral and color flow Doppler. \par \par Study Quality: Study quality was good. \par Study Comments: PULMONARY EMBOLISM \par  [id number] \par \par \par ------------------------------------------------------------------------------ \par -- \par CONCLUSIONS: \par \par  1. Normal left and right ventricular size and systolic function. \par  2. No significant valvular disease. \par  3. No pericardial effusion. \par \par ------------------------------------------------------------------------------ \par -- \par 2D AND M-MODE MEASUREMENTS (normal ranges within parentheses): \par \par Left Ventricle: Normal \par IVSd (2D): 0.82 cm (0.7-1.1) \par LVPWd (2D): 0.85 cm (0.7-1.1) \par LVIDd (2D): 5.61 cm (3.4-5.7) \par LVIDs (2D): 3.95 cm \par LV FS (2D): 29.6 % (>25%) \par LV EF (MOD BP): 54 % (>55%) \par \par LV DIASTOLIC FUNCTION: \par \par MV Peak E: 78.00 cm/s MV e' lat: 6.8 cm/s MV E/e' lat ratio: 11.4 \par MV Peak A: 83.40 cm/s MV e' med: 6.6 cm/s MV E/e' med ratio: 11.9 \par E/A Ratio: 0.94 MV e' av.7 cm/s Decel Time: 124 msec \par \par SPECTRAL DOPPLER ANALYSIS: \par \par Mitral Valve: \par MV Max Howard: MV P1/2 Time: 35.96 msec \par MV Mean Grad: MV Area, PHT: 6.12 cmï¿½ \par \par \par Aortic Valve: AoV Max Howard: AoV Peak PG: AoV Mean PG: \par  2.0 mmHg \par LVOT Vmax: 0.81 m/s LVOT VTI: 0.173 m LVOT Diameter: 2.10 cm \par AoV Area, VMax: AoV Area, VTI: 2.94 cmï¿½ AoV Area, Vmn: 2.70 cmï¿½ \par \par Tricuspid Valve and PA/RV Systolic Pressure: TR Max Velocity: 2.86 m/s RA \par Pressure: 3 mmHg RVSP/PASP: 35.7 mmHg \par \par \par ------------------------------------------------------------------------------ \par -- \par FINDINGS: \par \par Left Ventricle: \par The left ventricle is normal in size, wall thickness, and systolic function \par with a calculated ejection fraction of 54.0 %. Probably normal left \par ventricular wall motion. There is normal left ventricular diastolic function \par and filling pressure. \par \par Right Ventricle: \par The right ventricle is normal in size and systolic function. \par \par Left Atrium: \par The left atrium is normal in size. \par \par Right Atrium: \par The right atrium is normal in size. \par \par Aortic Valve: \par The aortic valve is tricuspid with normal structure and function without \par stenosis. There is no aortic regurgitation. \par \par Pulmonic Valve: \par Structurally normal pulmonic valve with normal leaflet excursion. There is \par trace pulmonic regurgitation. \par \par Mitral Valve: \par Structurally normal mitral valve with normal leaflet excursion. There is \par trace mitral regurgitation. \par \par Tricuspid Valve: \par Structurally normal tricuspid valve with normal leaflet excursion. There is \par mild tricuspid regurgitation. Pulmonary artery systolic pressure (estimated \par using the tricuspid regurgitant gradient and an estimate of right atrial \par pressure) is 35.7 mmHg. \par \par Aorta: \par The aortic root is normal in size and structure. \par \par Venous: \par The inferior vena cava is normal in size (<2.1cm) with normal inspiratory \par collapse (>50%) consistent with normal right atrial pressure ( \par 3, range 0-5mmHg). \par \par Pericardium: \par No pericardial effusion is seen. \par \par Dr. Monica Finney MD. \par \par Electronically signed by Dr. Monica Finney MD. \par Signature Date/Time: 10/7/2019/1:57:53 PM \par \par \par \par *** Final *** \par \par \par \par \par \par \par \par \par "Thank you for the opportunity to participate in the care of this patient." \par \par \par \par MONICA FINNEY \par This document has been electronically signed. Oct 7 2019 11:19AM \par \par \par \par \par \par \par EXAM: CT ANGIO CHEST PE PROTOCOL IC \par \par PROCEDURE DATE: 10/05/2019 \par \par \par \par INTERPRETATION: CTA (CT angiography) of the CHEST dated 10/5/2019 11:09 PM \par \par INDICATION: Shortness of breath, elevated d-dimer. Assess for pulmonary \par embolism. \par \par TECHNIQUE: CT angiography of the chest was performed during bolus injection \par of intravenous contrast. Post-processing including the production of axial, \par coronal and sagittal multiplanar reformatted images and axial and coronal \par maximum intensity projections (MIPs) was performed. \par \par PRIOR STUDY: None. \par \par FINDINGS: Evaluation for pulmonary embolism is somewhat limited due to \par respiratory motion artifact. There are however definite filling defects \par within several subsegmental branches of the right lower lobe consistent with \par pulmonary emboli. There is also a filling defect at the segmental \par bifurcation of the right middle and lower lobes (series 2 image 69) which \par extends slightly into a right middle lobe segmental pulmonary artery. There \par are probable emboli in a few posterior left lower lobe subsegmental \par pulmonary arteries, although evaluation is limited due to respiratory motion \par artifact. The pulmonary trunk is normal caliber. No evidence of right heart \par strain is appreciated. The thoracic aorta is normal in appearance. The heart \par is mildly enlarged. No pericardial effusion is seen. No mediastinal, hilar \par or axillary lymphadenopathy is seen. \par \par No pleural effusions are seen. No consolidations are seen. A few left \par calcified granulomas are noted. Multiple small bullae in the medial right \par upper lobe are noted. \par \par Limited evaluation of the upper abdomen demonstrates a questionable tiny \par hiatal hernia. There is mild nonspecific prominence of the visualized main \par pancreatic duct. \par \par Evaluation of the osseous structures demonstrates no significant abnormality. \par \par \par IMPRESSION: \par Pulmonary emboli within the segmental/subsegmental branches of the right \par middle and lower lobes and possibly in the subsegmental branches of the left \par lower lobe. No CT evidence of right heart strain or pulmonary infarction. \par \par Discussed with Dr. Christianson at 1:33 AM on 10/6/2019. \par \par \par \par \par \par Thank you for the opportunity to participate in the care of this patient. \par \par LUZ MARIA MARINO M.D., RADIOLOGY RESIDENT \par This document has been electronically signed. \par TULIO MARTINEZ M.D., ATTENDING RADIOLOGIST \par This document has been electronically signed. Oct 6 2019 1:43AM \par \par \par \par \par

## 2020-05-27 NOTE — HISTORY OF PRESENT ILLNESS
[Home] : at home, [unfilled] , at the time of the visit. [Other Location: e.g. Home (Enter Location, City,State)___] : at [unfilled] [Verbal consent obtained from patient] : the patient, [unfilled] [Stable] : are stable [TextBox_4] : 10/30/2020\par \par 44 year old female with PMHx of fibroids and MVP presents to our clinic post-hospitalization at [facility] 10/6-10/10/2019 where she originally presented with complaints of a cat bite to RLE as well as 1 week of shortness of breath which she attributed to "life" stress/panic attacks at the time. States the SOB got progressively worse and she started to feel dizzy and then went to ER. Patient later diagnosed with bilateral pulmonary emboli noted on CTA, presumed to be secondary to being on OCP. Patient discontinued OCP & started on heparin drip during hospitalization and discharged on Eliquis 10 mg BID x 6 days.\par \par \par LE Doppler done at hospital negative for LE DVT. \par Echo & CTA negative for right heart strain. ECHO with PASP 37.5. \par \par Today, patient reports feeling better but does still feel SOB at times which she thinks could be due to anxiety/stress. She can walk about 5-10 blocks before feeling SOB which is an improvement from pre-hospitalization. Takes Eliquis daily and tolerating well- denies bloody urine, stool, nose, and/or gums. No coughing up blood. States she was on birth control for less than a month total. Of note, during hospitalization she had endometrial polyp curettage done- pathology benign.\par \par No coughing, wheezing, fever, chills, chest pain. No LE swelling or pain. \par \par Does report snoring at night as well as feeling sleepy during the daytime. \par \par 5/26/2020 (telehealth visit)\par \par Pt presents today for telehealth visit.  First episode of pulmonary emboli in October 2019 and has been placed on Eliquis since. She admits to starting hormone replacement therapy 2 weeks prior to her episode of pulmonary emboli.  She denies any other risk factors for thrombosis.  She has fibroids and since she has been on Eliquis she has been bleeding more and developed slight iron deficiency anemia. She has not been taking oral iron. Patient lost her father to intracranial bleed, after he had portal vein thrombosis from his chronic liver disease.  \par \par She denies SOB, bleeding, edema, coughing or wheezing.  \par \par \par \par \par  [FreeTextEntry1] : \par  [Oxygen] : the patient uses no supplemental oxygen

## 2020-06-21 ENCOUNTER — EMERGENCY (EMERGENCY)
Facility: HOSPITAL | Age: 45
LOS: 1 days | Discharge: HOME | End: 2020-06-21
Attending: EMERGENCY MEDICINE | Admitting: EMERGENCY MEDICINE
Payer: COMMERCIAL

## 2020-06-21 VITALS
HEART RATE: 84 BPM | OXYGEN SATURATION: 99 % | TEMPERATURE: 98 F | RESPIRATION RATE: 18 BRPM | SYSTOLIC BLOOD PRESSURE: 139 MMHG | DIASTOLIC BLOOD PRESSURE: 93 MMHG

## 2020-06-21 DIAGNOSIS — Z98.890 OTHER SPECIFIED POSTPROCEDURAL STATES: Chronic | ICD-10-CM

## 2020-06-21 DIAGNOSIS — Z88.0 ALLERGY STATUS TO PENICILLIN: ICD-10-CM

## 2020-06-21 DIAGNOSIS — M54.5 LOW BACK PAIN: ICD-10-CM

## 2020-06-21 DIAGNOSIS — M62.830 MUSCLE SPASM OF BACK: ICD-10-CM

## 2020-06-21 LAB
ALBUMIN SERPL ELPH-MCNC: 4.3 G/DL — SIGNIFICANT CHANGE UP (ref 3.3–5)
ALP SERPL-CCNC: 93 U/L — SIGNIFICANT CHANGE UP (ref 40–120)
ALT FLD-CCNC: 41 U/L — SIGNIFICANT CHANGE UP (ref 10–45)
ANION GAP SERPL CALC-SCNC: 12 MMOL/L — SIGNIFICANT CHANGE UP (ref 5–17)
APPEARANCE UR: CLEAR — SIGNIFICANT CHANGE UP
AST SERPL-CCNC: 41 U/L — HIGH (ref 10–40)
BASOPHILS # BLD AUTO: 0.03 K/UL — SIGNIFICANT CHANGE UP (ref 0–0.2)
BASOPHILS NFR BLD AUTO: 0.6 % — SIGNIFICANT CHANGE UP (ref 0–2)
BILIRUB SERPL-MCNC: 0.2 MG/DL — SIGNIFICANT CHANGE UP (ref 0.2–1.2)
BILIRUB UR-MCNC: NEGATIVE — SIGNIFICANT CHANGE UP
BUN SERPL-MCNC: 13 MG/DL — SIGNIFICANT CHANGE UP (ref 7–23)
CALCIUM SERPL-MCNC: 9 MG/DL — SIGNIFICANT CHANGE UP (ref 8.4–10.5)
CHLORIDE SERPL-SCNC: 105 MMOL/L — SIGNIFICANT CHANGE UP (ref 96–108)
CO2 SERPL-SCNC: 23 MMOL/L — SIGNIFICANT CHANGE UP (ref 22–31)
COLOR SPEC: YELLOW — SIGNIFICANT CHANGE UP
CREAT SERPL-MCNC: 0.84 MG/DL — SIGNIFICANT CHANGE UP (ref 0.5–1.3)
DIFF PNL FLD: ABNORMAL
EOSINOPHIL # BLD AUTO: 0.12 K/UL — SIGNIFICANT CHANGE UP (ref 0–0.5)
EOSINOPHIL NFR BLD AUTO: 2.4 % — SIGNIFICANT CHANGE UP (ref 0–6)
GLUCOSE SERPL-MCNC: 105 MG/DL — HIGH (ref 70–99)
GLUCOSE UR QL: NEGATIVE — SIGNIFICANT CHANGE UP
HCT VFR BLD CALC: 40.5 % — SIGNIFICANT CHANGE UP (ref 34.5–45)
HGB BLD-MCNC: 12.7 G/DL — SIGNIFICANT CHANGE UP (ref 11.5–15.5)
IMM GRANULOCYTES NFR BLD AUTO: 0.2 % — SIGNIFICANT CHANGE UP (ref 0–1.5)
KETONES UR-MCNC: NEGATIVE — SIGNIFICANT CHANGE UP
LEUKOCYTE ESTERASE UR-ACNC: NEGATIVE — SIGNIFICANT CHANGE UP
LYMPHOCYTES # BLD AUTO: 1.27 K/UL — SIGNIFICANT CHANGE UP (ref 1–3.3)
LYMPHOCYTES # BLD AUTO: 25.8 % — SIGNIFICANT CHANGE UP (ref 13–44)
MCHC RBC-ENTMCNC: 26.7 PG — LOW (ref 27–34)
MCHC RBC-ENTMCNC: 31.4 GM/DL — LOW (ref 32–36)
MCV RBC AUTO: 85.3 FL — SIGNIFICANT CHANGE UP (ref 80–100)
MONOCYTES # BLD AUTO: 0.19 K/UL — SIGNIFICANT CHANGE UP (ref 0–0.9)
MONOCYTES NFR BLD AUTO: 3.9 % — SIGNIFICANT CHANGE UP (ref 2–14)
NEUTROPHILS # BLD AUTO: 3.31 K/UL — SIGNIFICANT CHANGE UP (ref 1.8–7.4)
NEUTROPHILS NFR BLD AUTO: 67.1 % — SIGNIFICANT CHANGE UP (ref 43–77)
NITRITE UR-MCNC: NEGATIVE — SIGNIFICANT CHANGE UP
NRBC # BLD: 0 /100 WBCS — SIGNIFICANT CHANGE UP (ref 0–0)
PH UR: 5.5 — SIGNIFICANT CHANGE UP (ref 5–8)
PLATELET # BLD AUTO: 368 K/UL — SIGNIFICANT CHANGE UP (ref 150–400)
POTASSIUM SERPL-MCNC: 4.2 MMOL/L — SIGNIFICANT CHANGE UP (ref 3.5–5.3)
POTASSIUM SERPL-SCNC: 4.2 MMOL/L — SIGNIFICANT CHANGE UP (ref 3.5–5.3)
PROT SERPL-MCNC: 7.5 G/DL — SIGNIFICANT CHANGE UP (ref 6–8.3)
PROT UR-MCNC: NEGATIVE MG/DL — SIGNIFICANT CHANGE UP
RBC # BLD: 4.75 M/UL — SIGNIFICANT CHANGE UP (ref 3.8–5.2)
RBC # FLD: 16.1 % — HIGH (ref 10.3–14.5)
SODIUM SERPL-SCNC: 140 MMOL/L — SIGNIFICANT CHANGE UP (ref 135–145)
SP GR SPEC: >=1.03 — SIGNIFICANT CHANGE UP (ref 1–1.03)
UROBILINOGEN FLD QL: 0.2 E.U./DL — SIGNIFICANT CHANGE UP
WBC # BLD: 4.93 K/UL — SIGNIFICANT CHANGE UP (ref 3.8–10.5)
WBC # FLD AUTO: 4.93 K/UL — SIGNIFICANT CHANGE UP (ref 3.8–10.5)

## 2020-06-21 PROCEDURE — 99284 EMERGENCY DEPT VISIT MOD MDM: CPT

## 2020-06-21 PROCEDURE — 87086 URINE CULTURE/COLONY COUNT: CPT

## 2020-06-21 PROCEDURE — 80053 COMPREHEN METABOLIC PANEL: CPT

## 2020-06-21 PROCEDURE — 81001 URINALYSIS AUTO W/SCOPE: CPT

## 2020-06-21 PROCEDURE — 85025 COMPLETE CBC W/AUTO DIFF WBC: CPT

## 2020-06-21 PROCEDURE — 99284 EMERGENCY DEPT VISIT MOD MDM: CPT | Mod: 25

## 2020-06-21 PROCEDURE — 36415 COLL VENOUS BLD VENIPUNCTURE: CPT

## 2020-06-21 RX ORDER — LIDOCAINE 4 G/100G
1 CREAM TOPICAL ONCE
Refills: 0 | Status: COMPLETED | OUTPATIENT
Start: 2020-06-21 | End: 2020-06-21

## 2020-06-21 RX ORDER — OXYCODONE AND ACETAMINOPHEN 5; 325 MG/1; MG/1
1 TABLET ORAL ONCE
Refills: 0 | Status: DISCONTINUED | OUTPATIENT
Start: 2020-06-21 | End: 2020-06-21

## 2020-06-21 RX ORDER — LIDOCAINE 4 G/100G
1 CREAM TOPICAL
Qty: 5 | Refills: 0
Start: 2020-06-21 | End: 2020-06-25

## 2020-06-21 RX ORDER — DIAZEPAM 5 MG
5 TABLET ORAL ONCE
Refills: 0 | Status: DISCONTINUED | OUTPATIENT
Start: 2020-06-21 | End: 2020-06-21

## 2020-06-21 RX ORDER — DIAZEPAM 5 MG
1 TABLET ORAL
Qty: 9 | Refills: 0
Start: 2020-06-21 | End: 2020-06-23

## 2020-06-21 RX ORDER — LIDOCAINE 4 G/100G
1 CREAM TOPICAL
Qty: 3 | Refills: 0
Start: 2020-06-21 | End: 2020-06-23

## 2020-06-21 RX ORDER — MENTHOL 16 G/100G
1 CREAM TOPICAL
Qty: 3 | Refills: 0
Start: 2020-06-21 | End: 2020-06-23

## 2020-06-21 RX ADMIN — OXYCODONE AND ACETAMINOPHEN 1 TABLET(S): 5; 325 TABLET ORAL at 21:29

## 2020-06-21 RX ADMIN — LIDOCAINE 1 PATCH: 4 CREAM TOPICAL at 22:44

## 2020-06-21 RX ADMIN — Medication 5 MILLIGRAM(S): at 22:44

## 2020-06-21 NOTE — ED PROVIDER NOTE - CHPI ED SYMPTOMS NEG
no numbness/no tingling/no bowel dysfunction/no abdominal pain, no urinary sx, no chest pain, no sob/no bladder dysfunction

## 2020-06-21 NOTE — ED PROVIDER NOTE - PATIENT PORTAL LINK FT
You can access the FollowMyHealth Patient Portal offered by [facility] by registering at the following website: [email or web address]. By joining [facility]’s FollowMyHealth portal, you will also be able to view your health information using other applications (apps) compatible with our system.

## 2020-06-21 NOTE — ED PROVIDER NOTE - MUSCULOSKELETAL, MLM
Lower lumbar paraspinal tenderness that is reproducible on the L and R side, no midline vertebral tenderness, ambulating well, no motor or sensory deficit to lower extremity muscles, strength b/l 5/5.

## 2020-06-21 NOTE — ED ADULT NURSE NOTE - CAS ELECT INFOMATION PROVIDED
Discharge instructions provided, patient verbalized understanding. No signature required for patient due to infection control./DC instructions

## 2020-06-21 NOTE — ED PROVIDER NOTE - CLINICAL SUMMARY MEDICAL DECISION MAKING FREE TEXT BOX
Patient with back pain and muscle spasm. Pain is reproducible and positional. No focal motor or sensory deficit. Well appearing, NAD and VSS , ambulating well. Recommend follow up with pMD

## 2020-06-21 NOTE — ED ADULT NURSE NOTE - OBJECTIVE STATEMENT
Patient c/o of low back pain since yesterday, radiates to abdomen, no numbness/tingling sensation, no bowel /bladder dysfunction, denies fall or injury, no difficulty ambulating and bearing weight.

## 2020-06-21 NOTE — ED ADULT NURSE NOTE - CHPI ED NUR SYMPTOMS NEG
no numbness/no anorexia/no difficulty bearing weight/no constipation/no fatigue/no motor function loss/no bladder dysfunction/no bowel dysfunction/no neck tenderness/no tingling

## 2020-06-21 NOTE — ED PROVIDER NOTE - OBJECTIVE STATEMENT
44 y/o F with PMHx PE (on Eliquis) presents to the ED c/o lower back pain for the past 24 hours. Pt states she has tried taking 2 tablets of Tylenol 650mg each, and muscle relaxers from 5:30-6pm without relief. Pt states pain is worse with turning, twisting, sitting, and walking. Denies radiating pain. No numbness, tingling, or incontinence. No abdominal pain, urinary sx, chest pain or SOB.

## 2020-06-21 NOTE — ED PROVIDER NOTE - NSFOLLOWUPINSTRUCTIONS_ED_ALL_ED_FT
Acute Back Pain, Adult  Acute back pain is sudden and usually short-lived. It is often caused by an injury to the muscles and tissues in the back. The injury may result from:  A muscle or ligament getting overstretched or torn (strained). Ligaments are tissues that connect bones to each other. Lifting something improperly can cause a back strain.Wear and tear (degeneration) of the spinal disks. Spinal disks are circular tissue that provides cushioning between the bones of the spine (vertebrae).Twisting motions, such as while playing sports or doing yard work.A hit to the back.Arthritis.You may have a physical exam, lab tests, and imaging tests to find the cause of your pain. Acute back pain usually goes away with rest and home care.  Follow these instructions at home:  Managing pain, stiffness, and swelling     Take over-the-counter and prescription medicines only as told by your health care provider.Your health care provider may recommend applying ice during the first 24–48 hours after your pain starts. To do this:  Put ice in a plastic bag.Place a towel between your skin and the bag.Leave the ice on for 20 minutes, 2–3 times a day.If directed, apply heat to the affected area as often as told by your health care provider. Use the heat source that your health care provider recommends, such as a moist heat pack or a heating pad.  Place a towel between your skin and the heat source.Leave the heat on for 20–30 minutes.Remove the heat if your skin turns bright red. This is especially important if you are unable to feel pain, heat, or cold. You have a greater risk of getting burned.Activity        Do not stay in bed. Staying in bed for more than 1–2 days can delay your recovery.Sit up and stand up straight. Avoid leaning forward when you sit, or hunching over when you stand.  If you work at a desk, sit close to it so you do not need to lean over. Keep your chin tucked in. Keep your neck drawn back, and keep your elbows bent at a right angle. Your arms should look like the letter "L."Sit high and close to the steering wheel when you drive. Add lower back (lumbar) support to your car seat, if needed.Take short walks on even surfaces as soon as you are able. Try to increase the length of time you walk each day.Do not sit, drive, or  one place for more than 30 minutes at a time. Sitting or standing for long periods of time can put stress on your back.Do not drive or use heavy machinery while taking prescription pain medicine.Use proper lifting techniques. When you bend and lift, use positions that put less stress on your back:  Bend your knees.Keep the load close to your body.Avoid twisting.Exercise regularly as told by your health care provider. Exercising helps your back heal faster and helps prevent back injuries by keeping muscles strong and flexible.Work with a physical therapist to make a safe exercise program, as recommended by your health care provider. Do any exercises as told by your physical therapist.Lifestyle     Maintain a healthy weight. Extra weight puts stress on your back and makes it difficult to have good posture.Avoid activities or situations that make you feel anxious or stressed. Stress and anxiety increase muscle tension and can make back pain worse. Learn ways to manage anxiety and stress, such as through exercise.General instructions     Sleep on a firm mattress in a comfortable position. Try lying on your side with your knees slightly bent. If you lie on your back, put a pillow under your knees.Follow your treatment plan as told by your health care provider. This may include:  Cognitive or behavioral therapy.Acupuncture or massage therapy.Meditation or yoga.Contact a health care provider if:  You have pain that is not relieved with rest or medicine.You have increasing pain going down into your legs or buttocks.Your pain does not improve after 2 weeks.You have pain at night.You lose weight without trying.You have a fever or chills.Get help right away if:  You develop new bowel or bladder control problems.You have unusual weakness or numbness in your arms or legs.You develop nausea or vomiting.You develop abdominal pain.You feel faint.Summary  Acute back pain is sudden and usually short-lived.Use proper lifting techniques. When you bend and lift, use positions that put less stress on your back.Take over-the-counter and prescription medicines and apply heat or ice as directed by your health care provider.This information is not intended to replace advice given to you by your health care provider. Make sure you discuss any questions you have with your health care provider.    Document Released: 12/18/2006 Document Revised: 04/07/2020 Document Reviewed: 08/01/2018  [employer] Patient Education © 2020 [employer]

## 2020-06-23 LAB
CULTURE RESULTS: SIGNIFICANT CHANGE UP
SPECIMEN SOURCE: SIGNIFICANT CHANGE UP

## 2020-07-10 ENCOUNTER — EMERGENCY (EMERGENCY)
Facility: HOSPITAL | Age: 45
LOS: 1 days | Discharge: HOME | End: 2020-07-10
Attending: EMERGENCY MEDICINE | Admitting: EMERGENCY MEDICINE
Payer: COMMERCIAL

## 2020-07-10 VITALS
OXYGEN SATURATION: 98 % | DIASTOLIC BLOOD PRESSURE: 73 MMHG | SYSTOLIC BLOOD PRESSURE: 140 MMHG | HEART RATE: 92 BPM | WEIGHT: 190.04 LBS | HEIGHT: 67 IN | TEMPERATURE: 98 F | RESPIRATION RATE: 18 BRPM

## 2020-07-10 DIAGNOSIS — Z98.890 OTHER SPECIFIED POSTPROCEDURAL STATES: Chronic | ICD-10-CM

## 2020-07-10 DIAGNOSIS — M79.632 PAIN IN LEFT FOREARM: ICD-10-CM

## 2020-07-10 DIAGNOSIS — Z88.0 ALLERGY STATUS TO PENICILLIN: ICD-10-CM

## 2020-07-10 PROCEDURE — 93971 EXTREMITY STUDY: CPT | Mod: 26,LT

## 2020-07-10 PROCEDURE — 93971 EXTREMITY STUDY: CPT

## 2020-07-10 PROCEDURE — 99284 EMERGENCY DEPT VISIT MOD MDM: CPT | Mod: 25

## 2020-07-10 PROCEDURE — 99285 EMERGENCY DEPT VISIT HI MDM: CPT

## 2020-07-10 PROCEDURE — 73090 X-RAY EXAM OF FOREARM: CPT

## 2020-07-10 PROCEDURE — 73090 X-RAY EXAM OF FOREARM: CPT | Mod: 26,LT

## 2020-07-10 RX ORDER — OXYCODONE AND ACETAMINOPHEN 5; 325 MG/1; MG/1
1 TABLET ORAL ONCE
Refills: 0 | Status: DISCONTINUED | OUTPATIENT
Start: 2020-07-10 | End: 2020-07-10

## 2020-07-10 RX ADMIN — OXYCODONE AND ACETAMINOPHEN 1 TABLET(S): 5; 325 TABLET ORAL at 17:26

## 2020-07-10 NOTE — ED PROVIDER NOTE - NSFOLLOWUPINSTRUCTIONS_ED_ALL_ED_FT
Arm Pain    WHAT YOU NEED TO KNOW:    Your arm pain may be caused by a number of conditions. Examples include arthritis, nerve problems, or an awkward position while you sleep. X-rays did not show a broken bone in your arm or wrist. Arm pain may be a sign of a serious condition that needs immediate care, such as a heart attack.    DISCHARGE INSTRUCTIONS:    Call your local emergency number (911 in the [place]) for any of the following:     You have any of the following signs of a heart attack:   Squeezing, pressure, or pain in your chest      You may also have any of the following:   Discomfort or pain in your back, neck, jaw, stomach, or arm      Shortness of breath      Nausea or vomiting      Lightheadedness or a sudden cold sweat        Return to the emergency department if:     You have severe pain, or pain that spreads from your arm to other areas.      You have swelling, tingling, or numbness in your hand or fingers, or the skin turns blue.      You cannot move your arm.    Call your doctor if:     You have questions or concerns about your condition or care.        Medicines: You may need any of the following:     Prescription pain medicine may be given. Ask your healthcare provider how to take this medicine safely. Some prescription pain medicines contain acetaminophen. Do not take other medicines that contain acetaminophen without talking to your healthcare provider. Too much acetaminophen may cause liver damage. Prescription pain medicine may cause constipation. Ask your healthcare provider how to prevent or treat constipation.       NSAIDs, such as ibuprofen, help decrease swelling, pain, and fever. This medicine is available with or without a doctor's order. NSAIDs can cause stomach bleeding or kidney problems in certain people. If you take blood thinner medicine, always ask your healthcare provider if NSAIDs are safe for you. Always read the medicine label and follow directions.      Take your medicine as directed. Contact your healthcare provider if you think your medicine is not helping or if you have side effects. Tell him or her if you are allergic to any medicine. Keep a list of the medicines, vitamins, and herbs you take. Include the amounts, and when and why you take them. Bring the list or the pill bottles to follow-up visits. Carry your medicine list with you in case of an emergency.    Self-care:     Rest your arm as directed. A sling may be used to keep your arm from moving while it heals.      Apply ice as directed. Ice helps decrease pain and swelling. Ice may also help prevent tissue damage. Use an ice pack, or put crushed ice in a plastic bag. Cover it with a towel. Apply it to your arm for 20 minutes every few hours, or as directed. Ask how many times to apply ice each day, and for how many days.      Elevate your arm above the level of your heart as often as you can. This will help decrease swelling and pain. Prop your arm on pillows or blankets to keep the area elevated comfortably.           Adjust your position if you work in front of a computer. You may need arm or wrist supports or change the height of your chair.      Keep a pain record. Write down when your pain happens and how severe it is. Include any other symptoms you have with your pain. A record will help you keep track of pain cycles. Bring the record with you to your follow-up visits. It may also help your healthcare provider find out what is causing your pain.    Follow up with your doctor as directed: You may need physical therapy. You may need to see an orthopedic specialist. Write down your questions so you remember to ask them during your visits.

## 2020-07-10 NOTE — ED ADULT NURSE NOTE - OBJECTIVE STATEMENT
Patient is a 46yo female reporting left wrist pain and swelling since this morning. Denies any known falls/injuries, fevers, chills, numbness/tingling. Swelling noted to left forearm. Radial pulses 2+ bilaterally.

## 2020-07-10 NOTE — ED ADULT NURSE NOTE - NSIMPLEMENTINTERV_GEN_ALL_ED
Implemented All Universal Safety Interventions:  [place] to call system. Call bell, personal items and telephone within reach. Instruct patient to call for assistance. Room bathroom lighting operational. Non-slip footwear when patient is off stretcher. Physically safe environment: no spills, clutter or unnecessary equipment. Stretcher in lowest position, wheels locked, appropriate side rails in place.

## 2020-07-10 NOTE — ED PROVIDER NOTE - CCCP TRG CHIEF CMPLNT
T1DM diagnosed 40 years ago, on medtronic mini med insulin pump with neuropathy, retinopathy, ESRD on HD, CAD, CVA, PAD s/p bypass, recent LLE angioplasty admitted due to CP/SOB at HD, found to be in DKA, now resolved post MICU admission. wrist pain/injury

## 2020-07-10 NOTE — ED PROVIDER NOTE - OBJECTIVE STATEMENT
46 y/o f hx PE on eliquis presents c/o pain, swelling to left forearm for the past day.  Pt reports pain came on gradually yesterday with mild swelling, is worse with movement and if she touches it.  Pt also reports mild pain to back of right knee for the past 3-4 days which is also worse with movement.  Pt reports spending some time outside in the past few days although didn't notice any insect bites.  Denies trauma, numbness/tingling to ext, fever, chills, weakness, CP, SOB, all other ROS negative.

## 2020-07-10 NOTE — ED PROVIDER NOTE - MUSCULOSKELETAL, MLM
left forearm- +mild swelling to distal forearm with +tenderness to palpation, no skin discoloration, no increased warmth, no fluctuance, no involvement of wrist or elbow with +FROM of both joints, radial pulse 2+, strength 5/5, sensation intact distally.  right leg +mild tenderness to posterior calf and popliteal area with no skin discoloration, no swelling, +FROM knee with no bony tenderness

## 2020-07-10 NOTE — ED ADULT TRIAGE NOTE - CHIEF COMPLAINT QUOTE
Pt CO pain to Left wrist/ FA today.  Pt states "I think something must have bit me because I don't remember falling or injuring myself."  Pt denies N/V/D, SOB, Fevers, CP and Dizziness.  Masked.

## 2020-07-10 NOTE — ED PROVIDER NOTE - PATIENT PORTAL LINK FT
You can access the FollowMyHealth Patient Portal offered by [facility] by registering at the following website: [email or web address]. By joining [employer]’s FollowMyHealth portal, you will also be able to view your health information using other applications (apps) compatible with our system.

## 2020-07-10 NOTE — ED PROVIDER NOTE - CLINICAL SUMMARY MEDICAL DECISION MAKING FREE TEXT BOX
44 y/o f hx PE on eliquis presents c/o pain and swelling to left forearm for the past day, pain to right lower leg x 3 days; both ext NVI, vss, no hx trauma.  xr left forearm unremarkable, no evidence of cellulitis or abscess on exam, swelling if of uncertain etiology although mild.  Will get u/s of both left arm and right leg to r/o dvt, if neg, likely will ace wrap, treat supportively and f/u with pmd

## 2020-07-10 NOTE — ED PROVIDER NOTE - ATTENDING CONTRIBUTION TO CARE
Pt w/ PMHx PE on Eliquis c/o pain, swelling to left forearm for the past day.  Pt reports pain came on gradually yesterday with mild swelling, is worse with movement and if she touches it.  Pt also reports mild pain to back of right knee for the past 3-4 days which is also worse with movement.  Pt reports spending some time outside in the past few days although didn't notice any insect bites.  Denies trauma, numbness/tingling to ext, fever, chills, weakness, CP, SOB, all other ROS negative.  Limited PE performed in the setting of the COVID10 pandemic, in efforts to limit exposure and cross-contamination  Constitutional: Well appearing, well nourished, awake, alert, oriented to person, place, time/situation and in no apparent distress.  ENMT: Airway patent.   Musculoskeletal: Range of motion is not limited at wrist / hand /elbow / shoulder. + mild soft tissue swelling isolated to the dorsum of the L forearm, w/ mild ttp. no overlying erythema / warmth. no induration nor fluctuance. no bites nor discernable nidus. median / ulnar / radial nn intact distally. cap refill < 3 sec. 2+ radial pulses b/l  Neuro: Alert and oriented x 3, face symmetric and speech fluent. Nml gross motor movement, grossly non focal   Skin: Skin normal color for race, warm, dry and intact. No evidence of rash.  Psych: Alert and oriented to person, place, time/situation. normal mood and affect. no apparent risk to self or others.   Forearm INTERPRETATION:  no acute fracture; + soft tissue swelling noted; normal bony alignment.   Isolated soft tissue swelling w/o clear etiology. No other signs of infection. No trauma. No bony ttp. Doubt DVT given presentation. No signs of compartment syndrome. XR / US neg for acute pathology. Recommended RICE, NSAIDs, close f/u PCP. Given strict return precautions for signs of infection or other worsening sx

## 2020-07-13 ENCOUNTER — RX RENEWAL (OUTPATIENT)
Age: 45
End: 2020-07-13

## 2020-07-13 PROBLEM — I26.99 OTHER PULMONARY EMBOLISM WITHOUT ACUTE COR PULMONALE: Chronic | Status: ACTIVE | Noted: 2020-06-21

## 2020-08-25 ENCOUNTER — EMERGENCY (EMERGENCY)
Facility: HOSPITAL | Age: 45
LOS: 1 days | Discharge: HOME | End: 2020-08-25
Attending: EMERGENCY MEDICINE | Admitting: EMERGENCY MEDICINE
Payer: COMMERCIAL

## 2020-08-25 VITALS
HEART RATE: 108 BPM | OXYGEN SATURATION: 99 % | DIASTOLIC BLOOD PRESSURE: 83 MMHG | HEIGHT: 67 IN | TEMPERATURE: 98 F | WEIGHT: 220.02 LBS | SYSTOLIC BLOOD PRESSURE: 124 MMHG | RESPIRATION RATE: 18 BRPM
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
## 2020-08-25 DIAGNOSIS — Z98.890 OTHER SPECIFIED POSTPROCEDURAL STATES: Chronic | ICD-10-CM

## 2020-08-25 DIAGNOSIS — Z88.0 ALLERGY STATUS TO PENICILLIN: ICD-10-CM

## 2020-08-25 DIAGNOSIS — L08.9 LOCAL INFECTION OF THE SKIN AND SUBCUTANEOUS TISSUE, UNSPECIFIED: ICD-10-CM
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
## 2020-08-25 DIAGNOSIS — M79.89 OTHER SPECIFIED SOFT TISSUE DISORDERS: ICD-10-CM
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
## 2020-08-25 DIAGNOSIS — S81.831A PUNCTURE WOUND WITHOUT FOREIGN BODY, RIGHT LOWER LEG, INITIAL ENCOUNTER: ICD-10-CM

## 2020-08-25 PROCEDURE — 73590 X-RAY EXAM OF LOWER LEG: CPT | Mod: 26,RT

## 2020-08-25 PROCEDURE — 73610 X-RAY EXAM OF ANKLE: CPT

## 2020-08-25 PROCEDURE — 73590 X-RAY EXAM OF LOWER LEG: CPT

## 2020-08-25 PROCEDURE — 73610 X-RAY EXAM OF ANKLE: CPT | Mod: 26,RT

## 2020-08-25 PROCEDURE — 99284 EMERGENCY DEPT VISIT MOD MDM: CPT

## 2020-08-25 RX ORDER — AZTREONAM 2 G
1 VIAL (EA) INJECTION
Qty: 20 | Refills: 0
Start: 2020-08-25 | End: 2020-09-03

## 2020-08-25 RX ORDER — IBUPROFEN 200 MG
600 TABLET ORAL ONCE
Refills: 0 | Status: COMPLETED | OUTPATIENT
Start: 2020-08-25 | End: 2020-08-25

## 2020-08-25 RX ADMIN — Medication 100 MILLIGRAM(S): at 20:58

## 2020-08-25 RX ADMIN — Medication 100 MILLIGRAM(S): at 19:56

## 2020-08-25 RX ADMIN — Medication 1 TABLET(S): at 19:56

## 2020-08-25 RX ADMIN — Medication 1 TABLET(S): at 20:58

## 2020-08-25 NOTE — ED PROVIDER NOTE - PATIENT PORTAL LINK FT
You can access the FollowMyHealth Patient Portal offered by [facility] by registering at the following website: [email or web address]. By joining [employer]’s FollowMyHealth portal, you will also be able to view your health information using other applications (apps) compatible with our system.

## 2020-08-25 NOTE — ED PROVIDER NOTE - ATTENDING CONTRIBUTION TO CARE
45 F pmh PE on eliquis p/w pain and swelling to R lower leg after her house cat bit her 3 days ago.  States cat is UTD vaccines, cat was playing and it bit the side of her leg causing few puncture wounds.  Pt cleaned area, but over last 3 days noticed some pain/swelling and redness around wounds- no discharge from wounds.  Tetanus UTD.  Denies f/c, headache, dizziness, fainting, chest pain, sob, cough, uri sxs, abd pain, nvd, calf pain, numbness/weakness, fall    Agree with HPI and PE as documented by PA  Pt with right lower leg - 3 cm area of erythema and cellulitis - no fluctuance to warrant drainage - xray without gas  Pt requires antibiotics and understands discharge instructions to return with worsening redness, fever, other symptoms

## 2020-08-25 NOTE — ED PROVIDER NOTE - NSFOLLOWUPINSTRUCTIONS_ED_ALL_ED_FT
Pasteurella Multocida Infection   Pasteurella multocida is a type of bacteria that can cause a skin infection. The skin infection may spread into the joints, bones, and tissues that connect muscles to bones (tendons). The infection may also spread to:  The surface of the brain (meningitis).The blood. This is rare. If the infection does spread to your blood, you may develop a heart infection (endocarditis).Infection with Pasteurella multocida can be treated with antibiotic medicine. It is important to get treatment as soon as possible so that more serious symptoms or conditions do not develop. This condition cannot spread from person to person (is not contagious).  What are the causes?  This infection is caused by the Pasteurella multocida bacteria. You may become infected through a bite or a scratch from an animal that carries the bacteria in its saliva. You may also become infected after an infected animal licks an open skin wound (like a cut or scratch) or licks near your eyes, nose, or mouth.  The following animals can carry the bacteria:  Cats and dogs. Most cats and many dogs have the bacteria in their mouths.Poultry, such as chickens and turkeys.Livestock, such as cows, horses, and sheep.What increases the risk?  This condition is more likely to develop in people who:  Live with a dog or cat.Work with live poultry or livestock.Have a weak disease-fighting system (immune system).Have a sore or an open wound.Have liver disease.Have a respiratory illness.What are the signs or symptoms?  Symptoms usually start within 24 hours after contact with an infected animal. Symptoms include:  Pain, redness, warmth, and swelling (cellulitis) around the bite, cut, or scratch.Swollen glands near the bite, cut, or scratch.Fluid leaking from the bite, cut, or scratch area.Fever.Symptoms of a more complicated disease may develop later. These may include:  Joint pain. This can make it hard for you to move.Bone pain.How is this diagnosed?  This condition may be diagnosed based on:  Your symptoms and medical history.A physical exam.Removal of fluid samples from a wound or a joint, to be tested for bacteria.Blood tests.Imaging tests, such as CT scan or MRI.How is this treated?  This condition is treated with antibiotic medicines. These medicines may be given by mouth (orally) or through an IV at the hospital, depending on the severity and location of your infection. You may also need a tetanus shot to help prevent further infection. If you have a wound, such as a bite or scratch, your health care provider may cover it with a bandage (dressing).  Follow these instructions at home:  Medicines     Take your antibiotic medicine as told by your health care provider. Do not stop taking the antibiotic even if you start to feel better.Take other over-the-counter and prescription medicines only as told by your health care provider.Wound care     If you have a wound, follow instructions from your health care provider about how to take care of your wound. Make sure you:  Wash your hands with soap and water before you change your dressing. If soap and water are not available, use hand .Change your dressing as told by your health care provider.Check your wound every day for signs that the infection is getting worse. Check for:  More redness, swelling, or pain.Fluid or blood.Warmth.Pus or a bad smell.General instructions        Rest and return to your normal activities as told by your health care provider. Ask your health care provider what activities are safe for you.To prevent future infections:  Avoid close contact with animals, including pets, especially when you have open wounds.Wash your hands with soap and water after every time you have contact with an animal.Keep all follow-up visits as told by your health care provider. This is important.Contact a health care provider if you:  Received a tetanus shot during treatment and you have any of the following at your injection site:  Swelling.Severe pain.Redness.Bleeding.Have any of the following:  More redness, swelling, or pain around your wound.More fluid or blood coming from your wound.Pus or a bad smell coming from your wound. Trouble moving the affected area.Swollen joints.Notice that your wound feels warm to the touch.Get help right away if you have:  A bad headache.A stiff neck.Chest pain.Difficulty breathing.Summary  Pasteurella multocida is a type of bacteria that can cause a skin infection. The skin infection may spread into the joints, bones, and tissues that connect muscles to bones (tendons).You may become infected through a bite or a scratch from an animal that carries the bacteria in its saliva.This condition is treated with antibiotic medicines.Take your antibiotic medicine as told by your health care provider. Do not stop taking the antibiotic even if you start to feel better.If you have a wound, follow instructions from your health care provider about how to take care of your wound.This information is not intended to replace advice given to you by your health care provider. Make sure you discuss any questions you have with your health care provider. Complete full course of antibiotics and monitor area of redness- if it extends past outline after 24 hours please return to ED.  Take tylenol for pain and elevate your leg.  Please call to make appointment with primary care doctor within 3-5 days.  Return to ED for extending redness/streaking, increased pain/swelling, discharge from wounds, fever, chills, vomiting or other concerns     Pasteurella Multocida Infection   Pasteurella multocida is a type of bacteria that can cause a skin infection. The skin infection may spread into the joints, bones, and tissues that connect muscles to bones (tendons). The infection may also spread to:  The surface of the brain (meningitis).The blood. This is rare. If the infection does spread to your blood, you may develop a heart infection (endocarditis).Infection with Pasteurella multocida can be treated with antibiotic medicine. It is important to get treatment as soon as possible so that more serious symptoms or conditions do not develop. This condition cannot spread from person to person (is not contagious).  What are the causes?  This infection is caused by the Pasteurella multocida bacteria. You may become infected through a bite or a scratch from an animal that carries the bacteria in its saliva. You may also become infected after an infected animal licks an open skin wound (like a cut or scratch) or licks near your eyes, nose, or mouth.  The following animals can carry the bacteria:  Cats and dogs. Most cats and many dogs have the bacteria in their mouths.Poultry, such as chickens and turkeys.Livestock, such as cows, horses, and sheep.What increases the risk?  This condition is more likely to develop in people who:  Live with a dog or cat.Work with live poultry or livestock.Have a weak disease-fighting system (immune system).Have a sore or an open wound.Have liver disease.Have a respiratory illness.What are the signs or symptoms?  Symptoms usually start within 24 hours after contact with an infected animal. Symptoms include:  Pain, redness, warmth, and swelling (cellulitis) around the bite, cut, or scratch.Swollen glands near the bite, cut, or scratch.Fluid leaking from the bite, cut, or scratch area.Fever.Symptoms of a more complicated disease may develop later. These may include:  Joint pain. This can make it hard for you to move.Bone pain.How is this diagnosed?  This condition may be diagnosed based on:  Your symptoms and medical history.A physical exam.Removal of fluid samples from a wound or a joint, to be tested for bacteria.Blood tests.Imaging tests, such as CT scan or MRI.How is this treated?  This condition is treated with antibiotic medicines. These medicines may be given by mouth (orally) or through an IV at the hospital, depending on the severity and location of your infection. You may also need a tetanus shot to help prevent further infection. If you have a wound, such as a bite or scratch, your health care provider may cover it with a bandage (dressing).  Follow these instructions at home:  Medicines     Take your antibiotic medicine as told by your health care provider. Do not stop taking the antibiotic even if you start to feel better.Take other over-the-counter and prescription medicines only as told by your health care provider.Wound care     If you have a wound, follow instructions from your health care provider about how to take care of your wound. Make sure you:  Wash your hands with soap and water before you change your dressing. If soap and water are not available, use hand .Change your dressing as told by your health care provider.Check your wound every day for signs that the infection is getting worse. Check for:  More redness, swelling, or pain.Fluid or blood.Warmth.Pus or a bad smell.General instructions        Rest and return to your normal activities as told by your health care provider. Ask your health care provider what activities are safe for you.To prevent future infections:  Avoid close contact with animals, including pets, especially when you have open wounds.Wash your hands with soap and water after every time you have contact with an animal.Keep all follow-up visits as told by your health care provider. This is important.Contact a health care provider if you:  Received a tetanus shot during treatment and you have any of the following at your injection site:  Swelling.Severe pain.Redness.Bleeding.Have any of the following:  More redness, swelling, or pain around your wound.More fluid or blood coming from your wound.Pus or a bad smell coming from your wound. Trouble moving the affected area.Swollen joints.Notice that your wound feels warm to the touch.Get help right away if you have:  A bad headache.A stiff neck.Chest pain.Difficulty breathing.Summary  Pasteurella multocida is a type of bacteria that can cause a skin infection. The skin infection may spread into the joints, bones, and tissues that connect muscles to bones (tendons).You may become infected through a bite or a scratch from an animal that carries the bacteria in its saliva.This condition is treated with antibiotic medicines.Take your antibiotic medicine as told by your health care provider. Do not stop taking the antibiotic even if you start to feel better.If you have a wound, follow instructions from your health care provider about how to take care of your wound.This information is not intended to replace advice given to you by your health care provider. Make sure you discuss any questions you have with your health care provider.

## 2020-08-25 NOTE — ED ADULT NURSE NOTE - OBJECTIVE STATEMENT
pt received into spot C A&Ox3 ambulatory appears comfortable arrives via walk in triage for eval fo RLE pain swelling and redness after her cat bit her 3 days ago. Appears slightly red tender to touch. Denies fever chills. Resp even and unlabored. Refused Ibuprofen that was ordered stating "they say it will kill you if you have covid." CADEN Sanchez made aware pt medicated per orders and sent ot XR will monitor and reassess, pt in nad

## 2020-08-25 NOTE — ED PROVIDER NOTE - PHYSICAL EXAMINATION
Vitals reviewed  Gen: well appearing, nad, speaking in full sentences  Skin: wwp, 4 puncture wounds noted to R lateral lower leg just above lateral malleolus with overlying scabs and 3cm surround erythema (does not cross into joint)- no streaking, no discharge/bleeding, no crepitus, induration or fluctuance  HEENT: ncat, eomi, mmm  CV: rrr, no audible m/r/g  Resp: symmetrical expansion, ctab, no w/r/r  Ext: FROM throughout including R ankle, no peripheral edema/calf ttp, DP/PT pulse 2+  Neuro: alert/oriented, no focal deficits, steady gait

## 2020-08-25 NOTE — ED PROVIDER NOTE - CLINICAL SUMMARY MEDICAL DECISION MAKING FREE TEXT BOX
45 F pmh PE on eliquis p/w pain and swelling to R lower leg after her house cat bit her 3 days ago. on exam pt afebrile, RLE w/ 4 puncture wounds noted to lateral lower leg just above lateral malleolus with overlying scabs and 3cm surround erythema- no streaking, no discharge/bleeding, no crepitus, induration or fluctuance;  RLE w/ FROM and NVI.  xray** 45 F pmh PE on eliquis p/w pain and swelling to R lower leg after her house cat bit her 3 days ago. on exam pt afebrile, RLE w/ 4 puncture wounds noted to lateral lower leg just above lateral malleolus with overlying scabs and 3cm surround erythema- no streaking, no discharge/bleeding, no crepitus, induration or fluctuance;  RLE w/ FROM and NVI.  xray R ankle/tib/fib shows no evidence of gas, no fracture/dislocation. given doxy/bactrim in ED, rxs sent to pharmacy.  declined NSAIDs, offered tylenol and said she will take her own.  area of cellulitis outlined and instructed to return if redness extends. discussed strict return parameters

## 2020-08-25 NOTE — ED PROVIDER NOTE - OBJECTIVE STATEMENT
45 F pmh PE on eliquis p/w pain and swelling to R lower leg after her house cat bit her 3 days ago.  States cat is UTD vaccines, cat was playing and it bit the side of her leg causing few puncture wounds.  Pt cleaned area, but over last 3 days noticed some pain/swelling and redness around wounds- no discharge from wounds.  Tetanus UTD.  Denies f/c, headache, dizziness, fainting, chest pain, sob, cough, uri sxs, abd pain, nvd, calf pain, numbness/weakness, fall

## 2020-09-24 ENCOUNTER — TRANSCRIPTION ENCOUNTER (OUTPATIENT)
Age: 45
End: 2020-09-24

## 2020-09-25 ENCOUNTER — TRANSCRIPTION ENCOUNTER (OUTPATIENT)
Age: 45
End: 2020-09-25

## 2020-09-28 ENCOUNTER — TRANSCRIPTION ENCOUNTER (OUTPATIENT)
Age: 45
End: 2020-09-28

## 2020-10-12 ENCOUNTER — LABORATORY RESULT (OUTPATIENT)
Age: 45
End: 2020-10-12

## 2020-10-12 ENCOUNTER — APPOINTMENT (OUTPATIENT)
Dept: PULMONOLOGY | Facility: CLINIC | Age: 45
End: 2020-10-12

## 2020-10-12 ENCOUNTER — APPOINTMENT (OUTPATIENT)
Dept: HEMATOLOGY ONCOLOGY | Facility: CLINIC | Age: 45
End: 2020-10-12
Payer: COMMERCIAL

## 2020-10-12 VITALS
WEIGHT: 225 LBS | OXYGEN SATURATION: 98 % | HEART RATE: 79 BPM | TEMPERATURE: 98.7 F | SYSTOLIC BLOOD PRESSURE: 130 MMHG | HEIGHT: 67 IN | DIASTOLIC BLOOD PRESSURE: 90 MMHG | RESPIRATION RATE: 12 BRPM | BODY MASS INDEX: 35.31 KG/M2

## 2020-10-12 VITALS
OXYGEN SATURATION: 95 % | HEART RATE: 106 BPM | WEIGHT: 225.38 LBS | TEMPERATURE: 97.8 F | BODY MASS INDEX: 35.37 KG/M2 | DIASTOLIC BLOOD PRESSURE: 98 MMHG | HEIGHT: 67 IN | SYSTOLIC BLOOD PRESSURE: 151 MMHG

## 2020-10-12 PROCEDURE — 99214 OFFICE O/P EST MOD 30 MIN: CPT | Mod: 25

## 2020-10-12 PROCEDURE — 36415 COLL VENOUS BLD VENIPUNCTURE: CPT

## 2020-10-13 ENCOUNTER — TRANSCRIPTION ENCOUNTER (OUTPATIENT)
Age: 45
End: 2020-10-13

## 2020-10-13 RX ORDER — ERGOCALCIFEROL 1.25 MG/1
1.25 MG CAPSULE ORAL
Qty: 4 | Refills: 2 | Status: ACTIVE | COMMUNITY
Start: 2020-10-13 | End: 1900-01-01

## 2020-10-14 NOTE — HISTORY OF PRESENT ILLNESS
[de-identified] : October 12, 2020 patient returns for follow-up and to discuss possible DC of AC... She was seen in Dr. Morris's office and got a referral for a VQ scan... She states her friend who came down with a pulmonary emboli at about the same time she did get much better care with frequent blood work at  [facility]... [de-identified] : 45-year old -American female had her first episode of pulmonary emboli in October 2019 and has been placed on Eliquis since.  She admits to starting hormone replacement therapy 2 weeks prior to her episode of pulmonary emboli... She denies any other risk factors for thrombosis... She has fibroids and since she has been on Eliquis she has been bleeding more and developed slight iron deficiency anemia... She has not been taking oral iron... Patient requested this appointment which was rescheduled multiple times... To discuss cessation of anticoagulation therapy... Patient lost her father to intracranial bleed, after he had portal vein thrombosis from his chronic liver disease...\par \par She saw Dr. Morris for her pulmonary emboli in the past.

## 2020-10-14 NOTE — REASON FOR VISIT
[Initial Consultation] : an initial consultation for [Blood Count Assessment] : blood count assessment [FreeTextEntry2] : Length of anticoagulation therapy

## 2020-10-23 ENCOUNTER — RESULT REVIEW (OUTPATIENT)
Age: 45
End: 2020-10-23

## 2020-10-23 ENCOUNTER — OUTPATIENT (OUTPATIENT)
Dept: OUTPATIENT SERVICES | Facility: HOSPITAL | Age: 45
LOS: 1 days | End: 2020-10-23
Payer: COMMERCIAL

## 2020-10-23 DIAGNOSIS — Z98.890 OTHER SPECIFIED POSTPROCEDURAL STATES: Chronic | ICD-10-CM

## 2020-10-23 PROCEDURE — A9540: CPT

## 2020-10-23 PROCEDURE — 78582 LUNG VENTILAT&PERFUS IMAGING: CPT

## 2020-10-23 PROCEDURE — 78582 LUNG VENTILAT&PERFUS IMAGING: CPT | Mod: 26

## 2020-10-23 PROCEDURE — A9567: CPT

## 2020-11-02 ENCOUNTER — NON-APPOINTMENT (OUTPATIENT)
Age: 45
End: 2020-11-02

## 2020-11-02 ENCOUNTER — APPOINTMENT (OUTPATIENT)
Dept: HEART AND VASCULAR | Facility: CLINIC | Age: 45
End: 2020-11-02
Payer: COMMERCIAL

## 2020-11-02 VITALS
HEART RATE: 92 BPM | SYSTOLIC BLOOD PRESSURE: 134 MMHG | DIASTOLIC BLOOD PRESSURE: 88 MMHG | HEIGHT: 67 IN | WEIGHT: 228 LBS | BODY MASS INDEX: 35.79 KG/M2 | TEMPERATURE: 98.3 F

## 2020-11-02 DIAGNOSIS — I36.1 NONRHEUMATIC TRICUSPID (VALVE) INSUFFICIENCY: ICD-10-CM

## 2020-11-02 DIAGNOSIS — R94.31 ABNORMAL ELECTROCARDIOGRAM [ECG] [EKG]: ICD-10-CM

## 2020-11-02 PROCEDURE — 93000 ELECTROCARDIOGRAM COMPLETE: CPT

## 2020-11-02 PROCEDURE — 99072 ADDL SUPL MATRL&STAF TM PHE: CPT

## 2020-11-02 PROCEDURE — 99204 OFFICE O/P NEW MOD 45 MIN: CPT | Mod: 25

## 2020-11-02 PROCEDURE — 93306 TTE W/DOPPLER COMPLETE: CPT

## 2020-11-02 RX ORDER — IRON POLYSACCHARIDE COMPLEX 15MG/0.5ML
15 DROPS ORAL
Refills: 0 | Status: COMPLETED | COMMUNITY
End: 2020-11-02

## 2020-11-02 RX ORDER — SULFAMETHOXAZOLE AND TRIMETHOPRIM 800; 160 MG/1; MG/1
800-160 TABLET ORAL
Qty: 20 | Refills: 0 | Status: COMPLETED | COMMUNITY
Start: 2020-08-25 | End: 2020-11-02

## 2020-11-02 RX ORDER — OXYCODONE AND ACETAMINOPHEN 5; 325 MG/1; MG/1
5-325 TABLET ORAL
Qty: 8 | Refills: 0 | Status: COMPLETED | COMMUNITY
Start: 2020-06-21 | End: 2020-11-02

## 2020-11-02 RX ORDER — DOXYCYCLINE HYCLATE 100 MG/1
100 CAPSULE ORAL
Qty: 28 | Refills: 0 | Status: COMPLETED | COMMUNITY
Start: 2020-08-25 | End: 2020-11-02

## 2020-11-02 RX ORDER — APIXABAN 5 MG/1
5 TABLET, FILM COATED ORAL
Refills: 0 | Status: COMPLETED | COMMUNITY
End: 2020-11-02

## 2020-11-02 RX ORDER — DIAZEPAM 5 MG/1
5 TABLET ORAL
Qty: 9 | Refills: 0 | Status: COMPLETED | COMMUNITY
Start: 2020-06-21 | End: 2020-11-02

## 2020-11-02 NOTE — PHYSICAL EXAM
[General Appearance - Well Developed] : well developed [Normal Appearance] : normal appearance [Well Groomed] : well groomed [General Appearance - Well Nourished] : well nourished [No Deformities] : no deformities [General Appearance - In No Acute Distress] : no acute distress [Normal Conjunctiva] : the conjunctiva exhibited no abnormalities [Normal Oral Mucosa] : normal oral mucosa [Normal Jugular Venous A Waves Present] : normal jugular venous A waves present [Normal Jugular Venous V Waves Present] : normal jugular venous V waves present [No Jugular Venous Walter A Waves] : no jugular venous walter A waves [Heart Rate And Rhythm] : heart rate and rhythm were normal [Heart Sounds] : normal S1 and S2 [Murmurs] : no murmurs present [Edema] : no peripheral edema present [] : no respiratory distress [Respiration, Rhythm And Depth] : normal respiratory rhythm and effort [Exaggerated Use Of Accessory Muscles For Inspiration] : no accessory muscle use [Auscultation Breath Sounds / Voice Sounds] : lungs were clear to auscultation bilaterally [Bowel Sounds] : normal bowel sounds [Abdomen Soft] : soft [Abdomen Tenderness] : non-tender [Abdomen Mass (___ Cm)] : no abdominal mass palpated [Abnormal Walk] : normal gait [Nail Clubbing] : no clubbing of the fingernails [Skin Color & Pigmentation] : normal skin color and pigmentation [Oriented To Time, Place, And Person] : oriented to person, place, and time [FreeTextEntry1] : carotids w/o bruits

## 2020-11-02 NOTE — DISCUSSION/SUMMARY
[FreeTextEntry1] : EKG:NSR,NSSTT abn\par echo: LVH,normal LVEF,minimal MR/TR\par to see dietician for weight/LDL\par feel carrera is weight related??post PE- but will get CCTA\par cont eliquis for Hx DVT\par monitor for palpitations\par weight loss for elevated BP

## 2020-11-02 NOTE — HISTORY OF PRESENT ILLNESS
[FreeTextEntry1] : 45 year old female, non smoker, with PMHX of PE, DVT (4/2019 on Eliquis 5mg BID) with FMHX of MI ( father 50's) and angina in paternal aunt. \par \par Patient is not very active and wants clearance before starting an exercise regimen. She does report shortness of breath on exertion with no chest pains. She does report chronic bilateral leg swelling after long periods of standing but denies any PND or orthopnea. \par MUÑOZ occurs when walks too fast\par She reports occasional palpitations when feeling "hot" Denies any sob or dizziness. \par \par She never had a stress test. \par has chronic ankle edema

## 2020-11-02 NOTE — REVIEW OF SYSTEMS
[Dyspnea on exertion] : dyspnea during exertion [Lower Ext Edema] : lower extremity edema [Palpitations] : palpitations [Recent Weight Gain (___ Lbs)] : recent [unfilled] ~Ulb weight gain [Negative] : Heme/Lymph [Fever] : no fever [Headache] : no headache [Chills] : no chills [Feeling Fatigued] : not feeling fatigued [Shortness Of Breath] : no shortness of breath [Chest  Pressure] : no chest pressure [Chest Pain] : no chest pain [Leg Claudication] : no intermittent leg claudication [Cough] : no cough [Abdominal Pain] : no abdominal pain [Nausea] : no nausea [Vomiting] : no vomiting [Heartburn] : no heartburn [Dysuria] : no dysuria [Muscle Cramps] : no muscle cramps [Dizziness] : no dizziness [Easy Bleeding] : no tendency for easy bleeding [Easy Bruising] : no tendency for easy bruising

## 2020-11-16 ENCOUNTER — MED ADMIN CHARGE (OUTPATIENT)
Age: 45
End: 2020-11-16

## 2020-11-16 ENCOUNTER — APPOINTMENT (OUTPATIENT)
Dept: PULMONOLOGY | Facility: CLINIC | Age: 45
End: 2020-11-16
Payer: COMMERCIAL

## 2020-11-16 VITALS
OXYGEN SATURATION: 98 % | RESPIRATION RATE: 12 BRPM | BODY MASS INDEX: 34.84 KG/M2 | WEIGHT: 222 LBS | SYSTOLIC BLOOD PRESSURE: 120 MMHG | HEART RATE: 84 BPM | DIASTOLIC BLOOD PRESSURE: 90 MMHG | HEIGHT: 67 IN | TEMPERATURE: 98.1 F

## 2020-11-16 DIAGNOSIS — Z28.21 IMMUNIZATION NOT CARRIED OUT BECAUSE OF PATIENT REFUSAL: ICD-10-CM

## 2020-11-16 PROCEDURE — 99072 ADDL SUPL MATRL&STAF TM PHE: CPT

## 2020-11-16 PROCEDURE — 99214 OFFICE O/P EST MOD 30 MIN: CPT | Mod: 25

## 2020-11-16 NOTE — HISTORY OF PRESENT ILLNESS
[Stable] : are stable [Difficulty Breathing During Exertion] : improved dyspnea on exertion [Feelings Of Weakness On Exertion] : improved exercise intolerance [Cough] : denies coughing [Wheezing] : denies wheezing [Regional Soft Tissue Swelling Both Lower Extremities] : denies lower extremity edema [Chest Pain Or Discomfort] : denies chest pain [Snoring] : snoring [TextBox_4] : 10/30/2020\par \par 44 year old female with PMHx of fibroids and MVP presents to our clinic post-hospitalization at [facility] 10/6-10/10/2019 where she originally presented with complaints of a cat bite to RLE as well as 1 week of shortness of breath which she attributed to "life" stress/panic attacks at the time. States the SOB got progressively worse and she started to feel dizzy and then went to ER. Patient later diagnosed with bilateral pulmonary emboli noted on CTA, presumed to be secondary to being on OCP. Patient discontinued OCP & started on heparin drip during hospitalization and discharged on Eliquis 10 mg BID x 6 days.\par \par \par LE Doppler done at hospital negative for LE DVT. \par Echo & CTA negative for right heart strain. ECHO with PASP 37.5. \par \par Today, patient reports feeling better but does still feel SOB at times which she thinks could be due to anxiety/stress. She can walk about 5-10 blocks before feeling SOB which is an improvement from pre-hospitalization. Takes Eliquis daily and tolerating well- denies bloody urine, stool, nose, and/or gums. No coughing up blood. States she was on birth control for less than a month total. Of note, during hospitalization she had endometrial polyp curettage done- pathology benign.\par \par No coughing, wheezing, fever, chills, chest pain. No LE swelling or pain. \par \par Does report snoring at night as well as feeling sleepy during the daytime. \par \par 5/26/2020 (telehealth visit)\par \par Pt presents today for telehealth visit.  First episode of pulmonary emboli in October 2019 and has been placed on Eliquis since. She admits to starting hormone replacement therapy 2 weeks prior to her episode of pulmonary emboli.  She denies any other risk factors for thrombosis.  She has fibroids and since she has been on Eliquis she has been bleeding more and developed slight iron deficiency anemia. She has not been taking oral iron. Patient lost her father to intracranial bleed, after he had portal vein thrombosis from his chronic liver disease.  \par \par She denies SOB, bleeding, edema, coughing or wheezing.  \par \par \par 11/16/2020\par \par VQ scan 11/6/2020 Normal study.  No evidence of chronic thrombolic pulmonary HTN.  She lost weight.  She follow with cardiology and doing for testing.  Denies coughing, wheezing, fever or chills.  She is on Eliquis 5 BID without any bleeding.  pt with complaints of SOB after 2 blocks\par \par Refused flu vaccine.\par \par \par \par  [Oxygen] : the patient uses no supplemental oxygen [FreeTextEntry1] : \par

## 2020-11-16 NOTE — ASSESSMENT
[FreeTextEntry1] : Pulmonary Emboli  [provoked]\par - Patient tolerating Eliquis well. Patient to continue Eliquis 5 mg BID for 6 months.Patient to remain off OCP.  Discussed etiology of pulmonary emboli, as well as treatment & prognosis. Answered all questions to patient's apparent satisfaction.\par - Discussed with pt due to provoked PE she can stop anticoagulation.\par - Discussed VQ scan is negative for chronic thrombolic disease.  \par - Follow with stress echo if negative for pulmonary HTN will discontinue anticoagulin.  \par -  Dr. Payne thrombolic workup negative\par - Follow with CTA coronaries and Holter monitor with Dr. Novoa.\par - Refused flu vaccine\par

## 2020-11-16 NOTE — PROCEDURE
[FreeTextEntry1] : \par \par EXAM: ECHOCARDIOGRAM (CARDIOL) \par \par PROCEDURE DATE: 10/07/2019 \par \par \par \par INTERPRETATION: REPORT: \par ----------------------------------- \par TRANSTHORACIC ECHOCARDIOGRAM REPORT \par \par \par ------------------------------------------------------------------------------ \par -- \par Patient Name: LAKHWINDER RAMOS Date of Exam: 10/7/2019 \par Medical Rec #: [id number] Height: 66.9 in \par Account #: [id number] Weight: 213.8 lb \par YOB: 1975 BSA: 2.08 mï¿½ \par Patient Age: 44 years BP: 125/60 mmHg \par Patient Gender: F Sonographer: Marcia Christy \par  Refering Physician: RIANNA MARQUEZ \par \par \par CPT: ECHO TTE WO CON COMP - 45307; - [id number] \par Indication(s): I26.99 - Other pulmonary embolism without acute cor pulmonale \par Procedure: A complete two-dimensional transthoracic echocardiogram was \par  performed: 2D, M-mode, spectral and color flow Doppler. \par \par Study Quality: Study quality was good. \par Study Comments: PULMONARY EMBOLISM \par  [id number] \par \par \par ------------------------------------------------------------------------------ \par -- \par CONCLUSIONS: \par \par  1. Normal left and right ventricular size and systolic function. \par  2. No significant valvular disease. \par  3. No pericardial effusion. \par \par ------------------------------------------------------------------------------ \par -- \par 2D AND M-MODE MEASUREMENTS (normal ranges within parentheses): \par \par Left Ventricle: Normal \par IVSd (2D): 0.82 cm (0.7-1.1) \par LVPWd (2D): 0.85 cm (0.7-1.1) \par LVIDd (2D): 5.61 cm (3.4-5.7) \par LVIDs (2D): 3.95 cm \par LV FS (2D): 29.6 % (>25%) \par LV EF (MOD BP): 54 % (>55%) \par \par LV DIASTOLIC FUNCTION: \par \par MV Peak E: 78.00 cm/s MV e' lat: 6.8 cm/s MV E/e' lat ratio: 11.4 \par MV Peak A: 83.40 cm/s MV e' med: 6.6 cm/s MV E/e' med ratio: 11.9 \par E/A Ratio: 0.94 MV e' av.7 cm/s Decel Time: 124 msec \par \par SPECTRAL DOPPLER ANALYSIS: \par \par Mitral Valve: \par MV Max Howard: MV P1/2 Time: 35.96 msec \par MV Mean Grad: MV Area, PHT: 6.12 cmï¿½ \par \par \par Aortic Valve: AoV Max Howard: AoV Peak PG: AoV Mean PG: \par  2.0 mmHg \par LVOT Vmax: 0.81 m/s LVOT VTI: 0.173 m LVOT Diameter: 2.10 cm \par AoV Area, VMax: AoV Area, VTI: 2.94 cmï¿½ AoV Area, Vmn: 2.70 cmï¿½ \par \par Tricuspid Valve and PA/RV Systolic Pressure: TR Max Velocity: 2.86 m/s RA \par Pressure: 3 mmHg RVSP/PASP: 35.7 mmHg \par \par \par ------------------------------------------------------------------------------ \par -- \par FINDINGS: \par \par Left Ventricle: \par The left ventricle is normal in size, wall thickness, and systolic function \par with a calculated ejection fraction of 54.0 %. Probably normal left \par ventricular wall motion. There is normal left ventricular diastolic function \par and filling pressure. \par \par Right Ventricle: \par The right ventricle is normal in size and systolic function. \par \par Left Atrium: \par The left atrium is normal in size. \par \par Right Atrium: \par The right atrium is normal in size. \par \par Aortic Valve: \par The aortic valve is tricuspid with normal structure and function without \par stenosis. There is no aortic regurgitation. \par \par Pulmonic Valve: \par Structurally normal pulmonic valve with normal leaflet excursion. There is \par trace pulmonic regurgitation. \par \par Mitral Valve: \par Structurally normal mitral valve with normal leaflet excursion. There is \par trace mitral regurgitation. \par \par Tricuspid Valve: \par Structurally normal tricuspid valve with normal leaflet excursion. There is \par mild tricuspid regurgitation. Pulmonary artery systolic pressure (estimated \par using the tricuspid regurgitant gradient and an estimate of right atrial \par pressure) is 35.7 mmHg. \par \par Aorta: \par The aortic root is normal in size and structure. \par \par Venous: \par The inferior vena cava is normal in size (<2.1cm) with normal inspiratory \par collapse (>50%) consistent with normal right atrial pressure ( \par 3, range 0-5mmHg). \par \par Pericardium: \par No pericardial effusion is seen. \par \par Dr. Monica Finney MD. \par \par Electronically signed by Dr. Monica Finney MD. \par Signature Date/Time: 10/7/2019/1:57:53 PM \par \par \par \par *** Final *** \par \par \par \par \par \par \par \par \par "Thank you for the opportunity to participate in the care of this patient." \par \par \par \par MONICA FINNEY \par This document has been electronically signed. Oct 7 2019 11:19AM \par \par \par \par \par \par \par EXAM: CT ANGIO CHEST PE PROTOCOL IC \par \par PROCEDURE DATE: 10/05/2019 \par \par \par \par INTERPRETATION: CTA (CT angiography) of the CHEST dated 10/5/2019 11:09 PM \par \par INDICATION: Shortness of breath, elevated d-dimer. Assess for pulmonary \par embolism. \par \par TECHNIQUE: CT angiography of the chest was performed during bolus injection \par of intravenous contrast. Post-processing including the production of axial, \par coronal and sagittal multiplanar reformatted images and axial and coronal \par maximum intensity projections (MIPs) was performed. \par \par PRIOR STUDY: None. \par \par FINDINGS: Evaluation for pulmonary embolism is somewhat limited due to \par respiratory motion artifact. There are however definite filling defects \par within several subsegmental branches of the right lower lobe consistent with \par pulmonary emboli. There is also a filling defect at the segmental \par bifurcation of the right middle and lower lobes (series 2 image 69) which \par extends slightly into a right middle lobe segmental pulmonary artery. There \par are probable emboli in a few posterior left lower lobe subsegmental \par pulmonary arteries, although evaluation is limited due to respiratory motion \par artifact. The pulmonary trunk is normal caliber. No evidence of right heart \par strain is appreciated. The thoracic aorta is normal in appearance. The heart \par is mildly enlarged. No pericardial effusion is seen. No mediastinal, hilar \par or axillary lymphadenopathy is seen. \par \par No pleural effusions are seen. No consolidations are seen. A few left \par calcified granulomas are noted. Multiple small bullae in the medial right \par upper lobe are noted. \par \par Limited evaluation of the upper abdomen demonstrates a questionable tiny \par hiatal hernia. There is mild nonspecific prominence of the visualized main \par pancreatic duct. \par \par Evaluation of the osseous structures demonstrates no significant abnormality. \par \par \par IMPRESSION: \par Pulmonary emboli within the segmental/subsegmental branches of the right \par middle and lower lobes and possibly in the subsegmental branches of the left \par lower lobe. No CT evidence of right heart strain or pulmonary infarction. \par \par Discussed with Dr. Christianson at 1:33 AM on 10/6/2019. \par \par \par \par \par \par Thank you for the opportunity to participate in the care of this patient. \par \par LUZ MARIA MARINO M.D., RADIOLOGY RESIDENT \par This document has been electronically signed. \par TULIO MARTINEZ M.D., ATTENDING RADIOLOGIST \par This document has been electronically signed. Oct 6 2019 1:43AM \par \par \par \par \par

## 2020-11-23 ENCOUNTER — APPOINTMENT (OUTPATIENT)
Dept: SLEEP CENTER | Facility: HOME HEALTH | Age: 45
End: 2020-11-23

## 2020-11-24 LAB
25(OH)D3 SERPL-MCNC: 13.6 NG/ML
ALBUMIN SERPL ELPH-MCNC: 4.6 G/DL
ALP BLD-CCNC: 75 U/L
ALT SERPL-CCNC: 25 U/L
ANION GAP SERPL CALC-SCNC: 10 MMOL/L
APTT 2H P 1:4 NP PPP: 37.6 SEC
APTT 2H P INC PPP: 38.5 SEC
APTT IMM NP/PRE NP PPP: 34.9 SEC
APTT INV RATIO PPP: 36.4 SEC
AST SERPL-CCNC: 22 U/L
B2 GLYCOPROT1 IGA SERPL IA-ACNC: <5 SAU
B2 GLYCOPROT1 IGG SER-ACNC: <5 SGU
B2 GLYCOPROT1 IGM SER-ACNC: <5 SMU
BASOPHILS # BLD AUTO: 0.03 K/UL
BASOPHILS NFR BLD AUTO: 0.7 %
BILIRUB SERPL-MCNC: 0.3 MG/DL
BUN SERPL-MCNC: 12 MG/DL
CALCIUM SERPL-MCNC: 9.7 MG/DL
CARDIOLIPIN AB SER IA-ACNC: NEGATIVE
CHLORIDE SERPL-SCNC: 103 MMOL/L
CO2 SERPL-SCNC: 26 MMOL/L
CONFIRM: 45.3 SEC
CREAT SERPL-MCNC: 0.83 MG/DL
DEPRECATED D DIMER PPP IA-ACNC: 290 NG/ML DDU
DRVVT IMM 1:2 NP PPP: NORMAL
DRVVT SCREEN TO CONFIRM RATIO: 0.8 RATIO
EOSINOPHIL # BLD AUTO: 0.12 K/UL
EOSINOPHIL NFR BLD AUTO: 2.7 %
ERYTHROCYTE [SEDIMENTATION RATE] IN BLOOD BY WESTERGREN METHOD: 22 MM/HR
FERRITIN SERPL-MCNC: 16 NG/ML
GLUCOSE SERPL-MCNC: 102 MG/DL
HAPTOGLOB SERPL-MCNC: 149 MG/DL
HCT VFR BLD CALC: 45.3 %
HGB BLD-MCNC: 14.3 G/DL
IMM GRANULOCYTES NFR BLD AUTO: 0 %
IRON SATN MFR SERPL: 18 %
IRON SERPL-MCNC: 80 UG/DL
LDH SERPL-CCNC: 283 U/L
LYMPHOCYTES # BLD AUTO: 1.98 K/UL
LYMPHOCYTES NFR BLD AUTO: 44.2 %
MAN DIFF?: NORMAL
MCHC RBC-ENTMCNC: 28.4 PG
MCHC RBC-ENTMCNC: 31.6 GM/DL
MCV RBC AUTO: 90.1 FL
MONOCYTES # BLD AUTO: 0.25 K/UL
MONOCYTES NFR BLD AUTO: 5.6 %
NEUTROPHILS # BLD AUTO: 2.1 K/UL
NEUTROPHILS NFR BLD AUTO: 46.8 %
NPP NORMAL POOLED PLASMA: 33.5 SECS
PLATELET # BLD AUTO: 380 K/UL
POTASSIUM SERPL-SCNC: 4.2 MMOL/L
PROT SERPL-MCNC: 7.3 G/DL
RBC # BLD: 5.03 M/UL
RBC # FLD: 15.6 %
SARS-COV-2 IGG SERPL IA-ACNC: 118 INDEX
SARS-COV-2 IGG SERPL QL IA: POSITIVE
SCREEN DRVVT: 40.1 SEC
SILICA CLOTTING TIME INTERPRETATION: NORMAL
SILICA CLOTTING TIME S/C: 0.84 RATIO
SODIUM SERPL-SCNC: 138 MMOL/L
TIBC SERPL-MCNC: 439 UG/DL
UIBC SERPL-MCNC: 358 UG/DL
VIT B12 SERPL-MCNC: 822 PG/ML
WBC # FLD AUTO: 4.48 K/UL

## 2020-12-14 ENCOUNTER — NON-APPOINTMENT (OUTPATIENT)
Age: 45
End: 2020-12-14
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
## 2021-04-27 ENCOUNTER — APPOINTMENT (OUTPATIENT)
Dept: FAMILY MEDICINE | Facility: CLINIC | Age: 46
End: 2021-04-27
Payer: COMMERCIAL

## 2021-04-27 VITALS
HEIGHT: 67 IN | DIASTOLIC BLOOD PRESSURE: 80 MMHG | BODY MASS INDEX: 35.16 KG/M2 | OXYGEN SATURATION: 100 % | HEART RATE: 80 BPM | SYSTOLIC BLOOD PRESSURE: 120 MMHG | WEIGHT: 224 LBS

## 2021-04-27 DIAGNOSIS — I26.99 OTHER PULMONARY EMBOLISM W/OUT ACUTE COR PULMONALE: ICD-10-CM

## 2021-04-27 DIAGNOSIS — Z20.822 CONTACT WITH AND (SUSPECTED) EXPOSURE TO COVID-19: ICD-10-CM

## 2021-04-27 DIAGNOSIS — R60.0 LOCALIZED EDEMA: ICD-10-CM

## 2021-04-27 DIAGNOSIS — E66.3 OVERWEIGHT: ICD-10-CM

## 2021-04-27 DIAGNOSIS — R00.2 PALPITATIONS: ICD-10-CM

## 2021-04-27 DIAGNOSIS — Z79.01 LONG TERM (CURRENT) USE OF ANTICOAGULANTS: ICD-10-CM

## 2021-04-27 DIAGNOSIS — Z20.9 CONTACT WITH AND (SUSPECTED) EXPOSURE TO UNSPECIFIED COMMUNICABLE DISEASE: ICD-10-CM

## 2021-04-27 DIAGNOSIS — Z91.89 OTHER SPECIFIED PERSONAL RISK FACTORS, NOT ELSEWHERE CLASSIFIED: ICD-10-CM

## 2021-04-27 DIAGNOSIS — R03.0 ELEVATED BLOOD-PRESSURE READING, W/OUT DIAGNOSIS OF HYPERTENSION: ICD-10-CM

## 2021-04-27 DIAGNOSIS — N92.0 EXCESSIVE AND FREQUENT MENSTRUATION WITH REGULAR CYCLE: ICD-10-CM

## 2021-04-27 DIAGNOSIS — Z86.59 PERSONAL HISTORY OF OTHER MENTAL AND BEHAVIORAL DISORDERS: ICD-10-CM

## 2021-04-27 PROCEDURE — 99215 OFFICE O/P EST HI 40 MIN: CPT

## 2021-04-27 PROCEDURE — 99072 ADDL SUPL MATRL&STAF TM PHE: CPT

## 2021-04-28 PROBLEM — R60.0 LEG EDEMA: Status: ACTIVE | Noted: 2020-02-25

## 2021-04-28 PROBLEM — R03.0 ELEVATED BLOOD PRESSURE READING WITHOUT DIAGNOSIS OF HYPERTENSION: Status: RESOLVED | Noted: 2020-11-02 | Resolved: 2021-04-27

## 2021-04-28 PROBLEM — Z20.9 EXPOSURE TO COMMUNICABLE DISEASE: Status: RESOLVED | Noted: 2020-02-25 | Resolved: 2021-04-28

## 2021-04-28 PROBLEM — Z20.822 EXPOSURE TO COVID-19 VIRUS: Status: RESOLVED | Noted: 2020-10-13 | Resolved: 2021-04-28

## 2021-04-28 PROBLEM — N92.0 MENORRHAGIA WITH REGULAR CYCLE: Status: ACTIVE | Noted: 2020-02-25

## 2021-04-28 PROBLEM — I26.99 PULMONARY EMBOLISM WITHOUT ACUTE COR PULMONALE: Status: RESOLVED | Noted: 2019-10-30 | Resolved: 2021-04-27

## 2021-04-28 PROBLEM — R00.2 INTERMITTENT PALPITATIONS: Status: RESOLVED | Noted: 2020-11-02 | Resolved: 2021-04-28

## 2021-04-28 PROBLEM — Z79.01 CHRONIC ANTICOAGULATION: Status: ACTIVE | Noted: 2020-05-07

## 2021-04-28 NOTE — HISTORY OF PRESENT ILLNESS
[FreeTextEntry1] : Establish care [de-identified] : Ms. LAKHWINDER RAMOS is a 46 year old female here today to establish care. \par Hx of PE in 10/19- had taken OCP for three days. \par Saw hematology and pulmonary- told by Dr Morris that she could stop AC by May 2021.\par

## 2021-04-28 NOTE — REVIEW OF SYSTEMS
[Negative] : Psychiatric [Recent Change In Weight] : ~T no recent weight change [FreeTextEntry8] : heavy menses

## 2021-04-28 NOTE — HEALTH RISK ASSESSMENT
[Fair] : ~his/her~ current health as fair  [Good] : ~his/her~  mood as  good [Yes] : Yes [2 - 4 times a month (2 pts)] : 2-4 times a month (2 points) [1 or 2 (0 pts)] : 1 or 2 (0 points) [Never (0 pts)] : Never (0 points) [No] : In the past 12 months have you used drugs other than those required for medical reasons? No [No falls in past year] : Patient reported no falls in the past year [0] : 2) Feeling down, depressed, or hopeless: Not at all (0) [Patient reported mammogram was normal] : Patient reported mammogram was normal [Patient reported PAP Smear was normal] : Patient reported PAP Smear was normal [Patient reported colonoscopy was normal] : Patient reported colonoscopy was normal [HIV Test offered] : HIV Test offered [Hepatitis C test offered] : Hepatitis C test offered [None] : None [Alone] : lives alone [Employed] : employed [Single] : single [Feels Safe at Home] : Feels safe at home [Fully functional (bathing, dressing, toileting, transferring, walking, feeding)] : Fully functional (bathing, dressing, toileting, transferring, walking, feeding) [Fully functional (using the telephone, shopping, preparing meals, housekeeping, doing laundry, using] : Fully functional and needs no help or supervision to perform IADLs (using the telephone, shopping, preparing meals, housekeeping, doing laundry, using transportation, managing medications and managing finances) [Reports normal functional visual acuity (ie: able to read med bottle)] : Reports normal functional visual acuity [Smoke Detector] : smoke detector [Carbon Monoxide Detector] : carbon monoxide detector [Safety elements used in home] : safety elements used in home [Seat Belt] :  uses seat belt [] : No [de-identified] : walking [de-identified] : normal [Change in mental status noted] : No change in mental status noted [Language] : denies difficulty with language [Behavior] : denies difficulty with behavior [Learning/Retaining New Information] : denies difficulty learning/retaining new information [Handling Complex Tasks] : denies difficulty handling complex tasks [Reasoning] : denies difficulty with reasoning [Reports changes in hearing] : Reports no changes in hearing [Spatial Ability and Orientation] : denies difficulty with spatial ability and orientation [Reports changes in vision] : Reports no changes in vision [Reports changes in dental health] : Reports no changes in dental health [Guns at Home] : no guns at home [Sunscreen] : does not use sunscreen [Travel to Developing Areas] : does not  travel to developing areas [TB Exposure] : is not being exposed to tuberculosis [Caregiver Concerns] : does not have caregiver concerns [MammogramDate] : 10/20 [PapSmearDate] : 10/20 [ColonoscopyDate] : 01/08

## 2021-04-28 NOTE — PHYSICAL EXAM
[Normal] : affect was normal and insight and judgment were intact [de-identified] : Obese female [de-identified] : peripheral edema

## 2021-07-09 ENCOUNTER — NON-APPOINTMENT (OUTPATIENT)
Age: 46
End: 2021-07-09

## 2021-07-09 ENCOUNTER — APPOINTMENT (OUTPATIENT)
Dept: ENDOCRINOLOGY | Facility: CLINIC | Age: 46
End: 2021-07-09

## 2021-09-08 ENCOUNTER — RX RENEWAL (OUTPATIENT)
Age: 46
End: 2021-09-08

## 2021-10-15 ENCOUNTER — RX RENEWAL (OUTPATIENT)
Age: 46
End: 2021-10-15

## 2022-02-04 NOTE — ED ADULT NURSE NOTE - NS ED NOTE ABUSE RESPONSE YN
Yes Nosebleeds Normal Treatment: I explained this is common when taking isotretinoin. I recommended saline mist in each nostril multiple times a day. If this worsens they will contact us.

## 2022-03-30 ENCOUNTER — APPOINTMENT (OUTPATIENT)
Dept: HEMATOLOGY ONCOLOGY | Facility: CLINIC | Age: 47
End: 2022-03-30

## 2022-04-07 NOTE — ED PROVIDER NOTE - SECONDARY DIAGNOSIS.
I reviewed the H&P, I examined the patient, and there are no changes in the patient's condition.    
Muscle spasm

## 2022-09-21 ENCOUNTER — APPOINTMENT (OUTPATIENT)
Dept: HEMATOLOGY ONCOLOGY | Facility: CLINIC | Age: 47
End: 2022-09-21

## 2022-09-21 VITALS
WEIGHT: 236 LBS | BODY MASS INDEX: 37.04 KG/M2 | SYSTOLIC BLOOD PRESSURE: 138 MMHG | HEART RATE: 94 BPM | DIASTOLIC BLOOD PRESSURE: 84 MMHG | TEMPERATURE: 97 F | OXYGEN SATURATION: 97 % | HEIGHT: 67 IN

## 2022-09-21 DIAGNOSIS — D50.0 IRON DEFICIENCY ANEMIA SECONDARY TO BLOOD LOSS (CHRONIC): ICD-10-CM

## 2022-09-21 PROCEDURE — 36415 COLL VENOUS BLD VENIPUNCTURE: CPT

## 2022-09-21 PROCEDURE — 99214 OFFICE O/P EST MOD 30 MIN: CPT | Mod: 25

## 2022-09-28 ENCOUNTER — NON-APPOINTMENT (OUTPATIENT)
Age: 47
End: 2022-09-28

## 2022-09-28 ENCOUNTER — APPOINTMENT (OUTPATIENT)
Dept: ENDOCRINOLOGY | Facility: CLINIC | Age: 47
End: 2022-09-28

## 2022-09-28 VITALS — OXYGEN SATURATION: 97 % | HEART RATE: 81 BPM | WEIGHT: 238 LBS | BODY MASS INDEX: 37.35 KG/M2 | HEIGHT: 67 IN

## 2022-09-28 PROCEDURE — 99203 OFFICE O/P NEW LOW 30 MIN: CPT | Mod: 25

## 2022-09-28 PROCEDURE — 36415 COLL VENOUS BLD VENIPUNCTURE: CPT

## 2022-09-28 NOTE — HISTORY OF PRESENT ILLNESS
[FreeTextEntry1] : Sep 28, 2022 \par \par PCP:  Dr. 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

## 2022-09-29 LAB
25(OH)D3 SERPL-MCNC: 22.2 NG/ML
ALBUMIN SERPL ELPH-MCNC: 4.5 G/DL
ALP BLD-CCNC: 85 U/L
ALT SERPL-CCNC: 29 U/L
ANION GAP SERPL CALC-SCNC: 13 MMOL/L
AST SERPL-CCNC: 24 U/L
BASOPHILS # BLD AUTO: 0.04 K/UL
BASOPHILS NFR BLD AUTO: 0.9 %
BILIRUB SERPL-MCNC: 0.2 MG/DL
BUN SERPL-MCNC: 9 MG/DL
CALCIUM SERPL-MCNC: 9.3 MG/DL
CHLORIDE SERPL-SCNC: 104 MMOL/L
CO2 SERPL-SCNC: 22 MMOL/L
CREAT SERPL-MCNC: 0.8 MG/DL
DEPRECATED D DIMER PPP IA-ACNC: 201 NG/ML DDU
EGFR: 91 ML/MIN/1.73M2
EOSINOPHIL # BLD AUTO: 0.16 K/UL
EOSINOPHIL NFR BLD AUTO: 3.8 %
ERYTHROCYTE [SEDIMENTATION RATE] IN BLOOD BY WESTERGREN METHOD: 15 MM/HR
FERRITIN SERPL-MCNC: 39 NG/ML
GLUCOSE SERPL-MCNC: 97 MG/DL
HCT VFR BLD CALC: 43.6 %
HGB BLD-MCNC: 14 G/DL
IMM GRANULOCYTES NFR BLD AUTO: 0 %
IRON SATN MFR SERPL: 21 %
IRON SERPL-MCNC: 78 UG/DL
LDH SERPL-CCNC: 309 U/L
LYMPHOCYTES # BLD AUTO: 1.62 K/UL
LYMPHOCYTES NFR BLD AUTO: 38 %
MAN DIFF?: NORMAL
MCHC RBC-ENTMCNC: 30.3 PG
MCHC RBC-ENTMCNC: 32.1 GM/DL
MCV RBC AUTO: 94.4 FL
MONOCYTES # BLD AUTO: 0.23 K/UL
MONOCYTES NFR BLD AUTO: 5.4 %
NEUTROPHILS # BLD AUTO: 2.21 K/UL
NEUTROPHILS NFR BLD AUTO: 51.9 %
PLATELET # BLD AUTO: 305 K/UL
POTASSIUM SERPL-SCNC: 4 MMOL/L
PROT SERPL-MCNC: 7.2 G/DL
RBC # BLD: 4.62 M/UL
RBC # FLD: 13.4 %
SODIUM SERPL-SCNC: 139 MMOL/L
TIBC SERPL-MCNC: 380 UG/DL
TSH SERPL-ACNC: 1.17 UIU/ML
UIBC SERPL-MCNC: 302 UG/DL
VIT B12 SERPL-MCNC: 502 PG/ML
WBC # FLD AUTO: 4.26 K/UL

## 2022-09-29 NOTE — HISTORY OF PRESENT ILLNESS
[de-identified] : 45-year old -American female had her first episode of pulmonary emboli in October 2019 and has been placed on Eliquis since.  She admits to starting hormone replacement therapy 2 weeks prior to her episode of pulmonary emboli... She denies any other risk factors for thrombosis... She has fibroids and since she has been on Eliquis she has been bleeding more and developed slight iron deficiency anemia... She has not been taking oral iron... Patient requested this appointment which was rescheduled multiple times... To discuss cessation of anticoagulation therapy... Patient lost her father to intracranial bleed, after he had portal vein thrombosis from his chronic liver disease...\par \par She saw Dr. Morris for her pulmonary emboli in the past. [de-identified] : October 12, 2020 patient returns for follow-up and to discuss possible DC of AC... She was seen in Dr. Morris's office and got a referral for a VQ scan... She states her friend who came down with a pulmonary emboli at about the same time she did get much better care with frequent blood work at [facility]...\par \par September 21, 2022 returns for follow-up stating she thinks she needs intravenous iron because she feels tired... Patient still takes Eliquis... Wants to go for hysterectomy with Dr. Ruiz in February 2023.

## 2022-09-29 NOTE — CONSULT LETTER
[Dear  ___] : Dear  [unfilled], [Consult Letter:] : I had the pleasure of evaluating your patient, [unfilled]. [Please see my note below.] : Please see my note below. [Consult Closing:] : Thank you very much for allowing me to participate in the care of this patient.  If you have any questions, please do not hesitate to contact me. [Sincerely,] : Sincerely, [FreeTextEntry3] : Fatimah Payne MD\par

## 2022-09-29 NOTE — ASSESSMENT
[FreeTextEntry1] : Repeat blood work done... Including D-dimers... All within normal limits, please see above.\par \par Patient is currently hematologically stable.\par \par Follow-up here as needed\par

## 2022-10-05 ENCOUNTER — APPOINTMENT (OUTPATIENT)
Dept: FAMILY MEDICINE | Facility: CLINIC | Age: 47
End: 2022-10-05

## 2022-12-29 LAB
ACTH SER-ACNC: 6.6 PG/ML
ANION GAP SERPL CALC-SCNC: 17 MMOL/L
BUN SERPL-MCNC: 17 MG/DL
C PEPTIDE SERPL-MCNC: 3.3 NG/ML
CALCIUM SERPL-MCNC: 9.3 MG/DL
CHLORIDE SERPL-SCNC: 105 MMOL/L
CO2 SERPL-SCNC: 21 MMOL/L
CORTIS SERPL-MCNC: 7.3 UG/DL
CREAT SERPL-MCNC: 0.82 MG/DL
EGFR: 89 ML/MIN/1.73M2
ESTIMATED AVERAGE GLUCOSE: 120 MG/DL
FSH SERPL-MCNC: 6.7 IU/L
GLUCOSE SERPL-MCNC: 73 MG/DL
HBA1C MFR BLD HPLC: 5.8 %
INSULIN SERPL-MCNC: 13.3 UU/ML
LH SERPL-ACNC: 27.9 IU/L
POTASSIUM SERPL-SCNC: 3.8 MMOL/L
SODIUM SERPL-SCNC: 143 MMOL/L
TSH SERPL-ACNC: 1.24 UIU/ML

## 2023-02-14 ENCOUNTER — APPOINTMENT (OUTPATIENT)
Dept: ENDOCRINOLOGY | Facility: CLINIC | Age: 48
End: 2023-02-14
Payer: COMMERCIAL

## 2023-02-14 VITALS
HEIGHT: 67 IN | BODY MASS INDEX: 35.31 KG/M2 | DIASTOLIC BLOOD PRESSURE: 70 MMHG | OXYGEN SATURATION: 97 % | HEART RATE: 87 BPM | WEIGHT: 225 LBS | SYSTOLIC BLOOD PRESSURE: 118 MMHG

## 2023-02-14 DIAGNOSIS — E16.2 HYPOGLYCEMIA, UNSPECIFIED: ICD-10-CM

## 2023-02-14 DIAGNOSIS — N91.2 AMENORRHEA, UNSPECIFIED: ICD-10-CM

## 2023-02-14 DIAGNOSIS — L68.0 HIRSUTISM: ICD-10-CM

## 2023-02-14 PROCEDURE — 99214 OFFICE O/P EST MOD 30 MIN: CPT

## 2023-02-14 RX ORDER — METFORMIN HYDROCHLORIDE 500 MG/1
500 TABLET, COATED ORAL
Qty: 90 | Refills: 3 | Status: ACTIVE | COMMUNITY
Start: 2023-02-14 | End: 1900-01-01

## 2023-02-15 ENCOUNTER — EMERGENCY (EMERGENCY)
Facility: HOSPITAL | Age: 48
LOS: 1 days | Discharge: HOME | End: 2023-02-15
Attending: EMERGENCY MEDICINE | Admitting: EMERGENCY MEDICINE
Payer: COMMERCIAL

## 2023-02-15 ENCOUNTER — NON-APPOINTMENT (OUTPATIENT)
Age: 48
End: 2023-02-15

## 2023-02-15 VITALS
DIASTOLIC BLOOD PRESSURE: 82 MMHG | HEIGHT: 67 IN | OXYGEN SATURATION: 98 % | SYSTOLIC BLOOD PRESSURE: 118 MMHG | WEIGHT: 229.94 LBS | TEMPERATURE: 98 F | HEART RATE: 92 BPM | RESPIRATION RATE: 18 BRPM

## 2023-02-15 DIAGNOSIS — Z88.0 ALLERGY STATUS TO PENICILLIN: ICD-10-CM

## 2023-02-15 DIAGNOSIS — I51.7 CARDIOMEGALY: ICD-10-CM

## 2023-02-15 DIAGNOSIS — Z98.890 OTHER SPECIFIED POSTPROCEDURAL STATES: Chronic | ICD-10-CM

## 2023-02-15 DIAGNOSIS — M25.471 EFFUSION, RIGHT ANKLE: ICD-10-CM

## 2023-02-15 DIAGNOSIS — J43.9 EMPHYSEMA, UNSPECIFIED: ICD-10-CM

## 2023-02-15 DIAGNOSIS — K44.9 DIAPHRAGMATIC HERNIA WITHOUT OBSTRUCTION OR GANGRENE: ICD-10-CM

## 2023-02-15 DIAGNOSIS — Z86.711 PERSONAL HISTORY OF PULMONARY EMBOLISM: ICD-10-CM

## 2023-02-15 DIAGNOSIS — E86.0 DEHYDRATION: ICD-10-CM

## 2023-02-15 DIAGNOSIS — Z79.84 LONG TERM (CURRENT) USE OF ORAL HYPOGLYCEMIC DRUGS: ICD-10-CM

## 2023-02-15 DIAGNOSIS — M25.472 EFFUSION, LEFT ANKLE: ICD-10-CM

## 2023-02-15 DIAGNOSIS — Z79.01 LONG TERM (CURRENT) USE OF ANTICOAGULANTS: ICD-10-CM

## 2023-02-15 DIAGNOSIS — R00.2 PALPITATIONS: ICD-10-CM

## 2023-02-15 DIAGNOSIS — R73.03 PREDIABETES: ICD-10-CM

## 2023-02-15 LAB
ALBUMIN SERPL ELPH-MCNC: 3.7 G/DL — SIGNIFICANT CHANGE UP (ref 3.3–5)
ALP SERPL-CCNC: 66 U/L — SIGNIFICANT CHANGE UP (ref 40–120)
ALT FLD-CCNC: 30 U/L — SIGNIFICANT CHANGE UP (ref 10–45)
ANION GAP SERPL CALC-SCNC: 10 MMOL/L — SIGNIFICANT CHANGE UP (ref 5–17)
APTT BLD: 33.1 SEC — SIGNIFICANT CHANGE UP (ref 27.5–35.5)
AST SERPL-CCNC: 24 U/L — SIGNIFICANT CHANGE UP (ref 10–40)
BASOPHILS # BLD AUTO: 0.03 K/UL — SIGNIFICANT CHANGE UP (ref 0–0.2)
BASOPHILS NFR BLD AUTO: 0.8 % — SIGNIFICANT CHANGE UP (ref 0–2)
BILIRUB SERPL-MCNC: 0.4 MG/DL — SIGNIFICANT CHANGE UP (ref 0.2–1.2)
BUN SERPL-MCNC: 17 MG/DL — SIGNIFICANT CHANGE UP (ref 7–23)
CALCIUM SERPL-MCNC: 8.5 MG/DL — SIGNIFICANT CHANGE UP (ref 8.4–10.5)
CHLORIDE SERPL-SCNC: 105 MMOL/L — SIGNIFICANT CHANGE UP (ref 96–108)
CO2 SERPL-SCNC: 24 MMOL/L — SIGNIFICANT CHANGE UP (ref 22–31)
CREAT SERPL-MCNC: 0.92 MG/DL — SIGNIFICANT CHANGE UP (ref 0.5–1.3)
EGFR: 77 ML/MIN/1.73M2 — SIGNIFICANT CHANGE UP
EOSINOPHIL # BLD AUTO: 0.14 K/UL — SIGNIFICANT CHANGE UP (ref 0–0.5)
EOSINOPHIL NFR BLD AUTO: 3.8 % — SIGNIFICANT CHANGE UP (ref 0–6)
GLUCOSE SERPL-MCNC: 91 MG/DL — SIGNIFICANT CHANGE UP (ref 70–99)
HCG SERPL-ACNC: <0 MIU/ML — SIGNIFICANT CHANGE UP
HCT VFR BLD CALC: 42.1 % — SIGNIFICANT CHANGE UP (ref 34.5–45)
HGB BLD-MCNC: 14 G/DL — SIGNIFICANT CHANGE UP (ref 11.5–15.5)
IMM GRANULOCYTES NFR BLD AUTO: 0 % — SIGNIFICANT CHANGE UP (ref 0–0.9)
INR BLD: 1.02 — SIGNIFICANT CHANGE UP (ref 0.88–1.16)
LYMPHOCYTES # BLD AUTO: 1.57 K/UL — SIGNIFICANT CHANGE UP (ref 1–3.3)
LYMPHOCYTES # BLD AUTO: 42.4 % — SIGNIFICANT CHANGE UP (ref 13–44)
MAGNESIUM SERPL-MCNC: 1.6 MG/DL — SIGNIFICANT CHANGE UP (ref 1.6–2.6)
MCHC RBC-ENTMCNC: 30.6 PG — SIGNIFICANT CHANGE UP (ref 27–34)
MCHC RBC-ENTMCNC: 33.3 GM/DL — SIGNIFICANT CHANGE UP (ref 32–36)
MCV RBC AUTO: 92.1 FL — SIGNIFICANT CHANGE UP (ref 80–100)
MONOCYTES # BLD AUTO: 0.24 K/UL — SIGNIFICANT CHANGE UP (ref 0–0.9)
MONOCYTES NFR BLD AUTO: 6.5 % — SIGNIFICANT CHANGE UP (ref 2–14)
NEUTROPHILS # BLD AUTO: 1.72 K/UL — LOW (ref 1.8–7.4)
NEUTROPHILS NFR BLD AUTO: 46.5 % — SIGNIFICANT CHANGE UP (ref 43–77)
NRBC # BLD: 0 /100 WBCS — SIGNIFICANT CHANGE UP (ref 0–0)
NT-PROBNP SERPL-SCNC: 194 PG/ML — SIGNIFICANT CHANGE UP (ref 0–300)
PHOSPHATE SERPL-MCNC: 3.2 MG/DL — SIGNIFICANT CHANGE UP (ref 2.5–4.5)
PLATELET # BLD AUTO: 281 K/UL — SIGNIFICANT CHANGE UP (ref 150–400)
POTASSIUM SERPL-MCNC: 4 MMOL/L — SIGNIFICANT CHANGE UP (ref 3.5–5.3)
POTASSIUM SERPL-SCNC: 4 MMOL/L — SIGNIFICANT CHANGE UP (ref 3.5–5.3)
PROT SERPL-MCNC: 6.8 G/DL — SIGNIFICANT CHANGE UP (ref 6–8.3)
PROTHROM AB SERPL-ACNC: 12.1 SEC — SIGNIFICANT CHANGE UP (ref 10.5–13.4)
RBC # BLD: 4.57 M/UL — SIGNIFICANT CHANGE UP (ref 3.8–5.2)
RBC # FLD: 13.1 % — SIGNIFICANT CHANGE UP (ref 10.3–14.5)
SODIUM SERPL-SCNC: 139 MMOL/L — SIGNIFICANT CHANGE UP (ref 135–145)
TROPONIN T SERPL-MCNC: 0.01 NG/ML — SIGNIFICANT CHANGE UP (ref 0–0.01)
WBC # BLD: 3.7 K/UL — LOW (ref 3.8–10.5)
WBC # FLD AUTO: 3.7 K/UL — LOW (ref 3.8–10.5)

## 2023-02-15 PROCEDURE — 85610 PROTHROMBIN TIME: CPT

## 2023-02-15 PROCEDURE — 71275 CT ANGIOGRAPHY CHEST: CPT | Mod: 26,MA

## 2023-02-15 PROCEDURE — 99285 EMERGENCY DEPT VISIT HI MDM: CPT

## 2023-02-15 PROCEDURE — 84702 CHORIONIC GONADOTROPIN TEST: CPT

## 2023-02-15 PROCEDURE — 71275 CT ANGIOGRAPHY CHEST: CPT | Mod: MA

## 2023-02-15 PROCEDURE — 84484 ASSAY OF TROPONIN QUANT: CPT

## 2023-02-15 PROCEDURE — 93970 EXTREMITY STUDY: CPT | Mod: 26

## 2023-02-15 PROCEDURE — 85025 COMPLETE CBC W/AUTO DIFF WBC: CPT

## 2023-02-15 PROCEDURE — 99285 EMERGENCY DEPT VISIT HI MDM: CPT | Mod: 25

## 2023-02-15 PROCEDURE — 85730 THROMBOPLASTIN TIME PARTIAL: CPT

## 2023-02-15 PROCEDURE — 83880 ASSAY OF NATRIURETIC PEPTIDE: CPT

## 2023-02-15 PROCEDURE — 93005 ELECTROCARDIOGRAM TRACING: CPT

## 2023-02-15 PROCEDURE — 93970 EXTREMITY STUDY: CPT

## 2023-02-15 PROCEDURE — 83735 ASSAY OF MAGNESIUM: CPT

## 2023-02-15 PROCEDURE — 84100 ASSAY OF PHOSPHORUS: CPT

## 2023-02-15 PROCEDURE — 80053 COMPREHEN METABOLIC PANEL: CPT

## 2023-02-15 PROCEDURE — 36415 COLL VENOUS BLD VENIPUNCTURE: CPT

## 2023-02-15 RX ORDER — SODIUM CHLORIDE 9 MG/ML
1000 INJECTION INTRAMUSCULAR; INTRAVENOUS; SUBCUTANEOUS ONCE
Refills: 0 | Status: COMPLETED | OUTPATIENT
Start: 2023-02-15 | End: 2023-02-15

## 2023-02-15 RX ADMIN — SODIUM CHLORIDE 1000 MILLILITER(S): 9 INJECTION INTRAMUSCULAR; INTRAVENOUS; SUBCUTANEOUS at 16:19

## 2023-02-15 NOTE — ED PROVIDER NOTE - CLINICAL SUMMARY MEDICAL DECISION MAKING FREE TEXT BOX
48F PMH pre-DM (started metformin yesterday), PE (taken off eliquis ~2mos ago) p/w several complaints. Pt states she feels dehydrated since yesterday. Also "a few days of intermittent cramping to anterior shin region (no calf symptoms). Also chronic fluctuating b/l ankle swelling. Also c/o unchanged mild baseline SOB that is worse w/ exertion ever since her PE a few yrs ago. Also intermittent palpitations. Concerned she may have a PE so came to ED. No other systemic symptoms.   Vitals wnl, exam as above.  ddx: Very low suspicion PE/DVT or ACS. Possible metabolic component.   Pt very nervous about DVT/PE. After discussion w/ pt regarding risks/benefits of CTA/US, given pt's concern, will CTA/US.   Labs, IVF, reassess.

## 2023-02-15 NOTE — ED ADULT TRIAGE NOTE - INTERNATIONAL TRAVEL
Pt states she is still having right sided sciatica pain.  She is taking gabapentin but it is making her sick to her stomach.  She states she cannot do PT right now because she has started to spotting blood and was told to wait until after her appt with Dr Kern.  She is requesting a MRI, please advise.   No

## 2023-02-15 NOTE — ED ADULT TRIAGE NOTE - ARRIVAL INFO ADDITIONAL COMMENTS
Pt to ED c/o left leg cramping starting last night associated with sob. Hx of past PE in 2019. Pt reports "stopped taking my Eliquis 2 months ago". EKG in progress.

## 2023-02-15 NOTE — ED PROVIDER NOTE - NSFOLLOWUPINSTRUCTIONS_ED_ALL_ED_FT
Stay well hydrated.    Return for fevers, persistent vomit, uncontrolled pain, worsening breathing, worsening lightheaded.    Follow up with primary doctor within 1-2 days.     Follow up with cardiologist.     Follow up with pulmonologist regarding your CT findings. Can call [phone] to schedule appointment.     Palpitations    A palpitation is the feeling that your heartbeat is irregular or is faster than normal. It may feel like your heart is fluttering or skipping a beat. They may be caused by many things, including smoking, caffeine, alcohol, stress, and certain medicines. Although most causes of palpitations are not serious, palpitations can be a sign of a serious medical problem. Avoid caffeine, alcohol, and tobacco products at home. Try to reduce stress and anxiety and make sure to get plenty of rest.     SEEK IMMEDIATE MEDICAL CARE IF YOU HAVE ANY OF THE FOLLOWING SYMPTOMS: chest pain, shortness of breath, severe headache, dizziness/lightheadedness, or fainting.     Follow up with cardiologist. Can call [phone] (HEART BEAT) to schedule appointment.    Can also call the following offices:    Dr. Marce Briggs, Dr. Shyam Carter  [phone]  [address], [address]  [place], [place]    Dr. Ruiz Christy  [phone]  [address], [address]  [place], [place]    Dr. Jose Ann  [phone]    [address], [address] [place], [place]  [phone]  Dr. Emory Batista, Dr. Michelle Miranda, Dr. Andra Houser, Dr. Emory Vasquez, Dr. Rashi Briseno, Dr. Hasmukh Sim, Dr. Sabi Bee, Dr. Ruiz Christy    [address], [address]  [place], [place]  [phone]  Dr. Mukesh Novoa, Dr. Amber Roe, Dr. Nighat Cuevas, Dr. Jose Ann, Dr. Brittany Monzon, Dr. Yobani Pang, Dr. Sidney Llanes    [address], 4th floor  [place], [place]  [phone]  Dr. Chauncey Pang, Dr. Oleg Herrera, Dr. Ellis Goins, Dr. Hasmukh Garnett, Dr. Shyam Carter, Dr. Lilly Robert    [address], 9 [facility]  [phone]  Dr. Cj Leahy, Dr. Beatrice Frye, Dr. Bruce Chan, Dr. Nighat Cuevas, Dr. John Blevins, Dr. Yobani Pang, Dr. Demond Art, Dr. Elroy Jauregui, Dr. Job Hussein    [phone]  Dr. Kike White    [phone]  Dr. Jackie Nicolas, Dr. Hasmukh Sim    [address]  [place], [place]  [phone]  Dr. Marce Briggs    [phone]  Dr. Loc Perez, Dr. Anthony Buckley, Dr. Tyesha Vigil, Dr. Ricky Hines, Dr. Trinidad Good, Dr. Mecca Avendano, Dr. Brittany Colbert, Dr. Jj Hernandez, Dr. Sabi Bee    [phone]  Dr. Guanaco Rivera, Dr. Elroy Jauregui, Dr. Job Hussein    [address] [facility] [address]  [place], [place]  [phone]  Dr. Son Mckeon, Dr. Osmin Collins, Dr. Avtar Jiang, Dr. Carmelo Lorenzana    [address] [place], [place]  [phone]  Dr. Son Mckeon    [address] [place], [place]  [phone]  Dr. Hasmukh Sim    [address]  [place], [place]  [phone]  Dr. Avtar Jiang    [address]  [place], [place]  [phone]  Dr. Sidney Llanes    [address] [place] [address]  [phone]  Dr. Hasmukh Sim    [address]  [place], [place]  [phone]  Dr. Emory Batista    [address]  [place], [place]  [phone]  Dr. Son Mckeon, Dr. Osmin Collins, Dr. Avtar Jiang, Dr. Carmelo Lorenzana    5 [facility], 2nd floor  [place]  [phone]  Dr. Shyam Carter    [address] [place] [address], 3rd floor  [place], [phone]  Dr. Bruce Chan, Dr. Nighat Cuevas, Dr. Taras Mathis    [address] [place] [address]  [phone]  Dr. Son Mckeon, Dr. Osmin Collins, Dr. Avtar Jiang, Dr. Carmelo Lorenzana

## 2023-02-15 NOTE — ED ADULT NURSE REASSESSMENT NOTE - NS ED NURSE REASSESS COMMENT FT1
call made to lab that labs were sent at 1401 but have still not been received. lab confirmed that they have not received labs at this time. labs redrawn and sent to lab.

## 2023-02-15 NOTE — ED PROVIDER NOTE - PROGRESS NOTE DETAILS
Klepfish: WBC 3.7, other labs grossly wnl. US results pending. CT pending. PT ws raising her voice very loudly in ED (I heard it from other side of ER). When I went over to investigate, pt states that a nurse was "politely disrespecting me" and that "I am not some women from the projects." States she is going to write a letter. Pt now calmer.   Will reassess. Klepfish: US w/ no acute pathology. CT showed "1. No evidence of acute or chronic pulmonary embolism. 2. Emphysema. 3. Cardiomegaly. 4. Hiatal hernia."  Pt remains very well appearing.   Discussed all results with patient, including any incidental radiological findings and lab abnormalities. Given copy of results and instructed to bring copy to primary doctor.   Discussed importance of outpt follow up and return precautions. Clinically no indication for further emergent ED workup or hospitalization at this time. Stable for dc, outpt f/u.

## 2023-02-15 NOTE — ED PROVIDER NOTE - OBJECTIVE STATEMENT
48F PMH pre-DM (started metformin yesterday), PE (taken off eliquis ~2mos ago) p/w several complaints. Pt states she feels dehydrated since yesterday. Also "a few days of intermittent cramping to anterior shin region (no calf symptoms). Also chronic fluctuating b/l ankle swelling. Also c/o unchanged mild baseline SOB that is worse w/ exertion ever since her PE a few yrs ago. Also intermittent palpitations. Concerned she may have a PE so came to ED. No other systemic symptoms.   Denies HA, URI symptoms, f/c, NVD, lightheaded, diaphoresis, abd pain, urinary complaints, focal weakness/numbness, black/bloody stool, recent travel/immobilization.

## 2023-02-15 NOTE — ED PROVIDER NOTE - PATIENT PORTAL LINK FT
You can access the FollowMyHealth Patient Portal offered by [facility] by registering at the following website: [email or web address]. By joining [facility]’s FollowMyHealth portal, you will also be able to view your health information using other applications (apps) compatible with our system.

## 2023-02-15 NOTE — ED ADULT NURSE NOTE - ALCOHOL PRE SCREEN (AUDIT - C)
Continuity of Care Form    Patient Name: Sadaf Perez   :  1994  MRN:  [id number]    Admit date:  2021  Discharge date:  ***    Code Status Order: Full Code   Advance Directives:      Admitting Physician:  Mita Atkinson MD  PCP: No primary care provider on file. Discharging Nurse: [facility] Unit/Room#: 5320/5320-01  Discharging Unit Phone Number: ***    Emergency Contact:   Extended Emergency Contact Information  Primary Emergency Contact: [address], [address] Phone: [phone]  Relation: Parent    Past Surgical History:  Past Surgical History:   Procedure Laterality Date    ARTHRODESIS Right 2021    LISFRANC ARTHRODESIS, ORIF METATARSALS 3, 4, SECOND RAY ARTHRODESIS, APPLICATION OF BELOW KNEE SPLINT, RIGHT FOOT performed by Dania Hanks DPM at [place] [address]         Immunization History: There is no immunization history on file for this patient.     Active Problems:  Patient Active Problem List   Diagnosis Code    Ankle pain, right M25.571       Isolation/Infection:   Isolation            No Isolation          Patient Infection Status       None to display            Nurse Assessment:  Last Vital Signs: /65   Pulse 99   Temp 99.9 °F (37.7 °C) (Oral)   Resp 16   Ht 5' 11\" (1.803 m)   Wt 190 lb (86.2 kg)   LMP 2021   SpO2 96%   BMI 26.50 kg/m²     Last documented pain score (0-10 scale): Pain Level: 7  Last Weight:   Wt Readings from Last 1 Encounters:   21 190 lb (86.2 kg)     Mental Status:  {IP PT MENTAL STATUS::::0}    IV Access:  { OTTO IV ACCESS:869770870:::0}    Nursing Mobility/ADLs:  Walking   {CHP DME ADLs:789499512:::0}  Transfer  {CHP DME ADLs:143834774:::0}  Bathing  {CHP DME ADLs:379870682:::0}  Dressing  {CHP DME ADLs:697196209:::0}  Toileting  {CHP DME ADLs:199077627:::0}  Feeding  {CHP DME ADLs:705415540:::0}  Med Admin  {CHP DME ADLs:334671120:::0}  Med Delivery   {[facility] MED Delivery:758301810:::0}    Wound Care Documentation and Therapy:        Elimination:  Continence: Bowel: {YES / XX:65608}  Bladder: {YES / GM:34586}  Urinary Catheter: {Urinary Catheter:934997837:::0}   Colostomy/Ileostomy/Ileal Conduit: {YES / RU:14841}       Date of Last BM: ***    Intake/Output Summary (Last 24 hours) at 2021 0748  Last data filed at 2021  Gross per 24 hour   Intake 2065 ml   Output 250 ml   Net 1815 ml     I/O last 3 completed shifts:   In:  [I.V.:1965; IV Piggyback:100]  Out: 250 [Urine:200; Blood:50]    Safety Concerns:     [address] Safety Concerns:956215105:::0}    Impairments/Disabilities:      [address] Impairments/Disabilities:095891735:::0}    Nutrition Therapy:  Current Nutrition Therapy:   [address] Diet List:568663213:::0}    Routes of Feeding: {[facility] DME Other Feedings:894179597:::0}  Liquids: {Slp liquid thickness:60076}  Daily Fluid Restriction: {CHP DME Yes amt example:328065673:::0}  Last Modified Barium Swallow with Video (Video Swallowing Test): {Done Not Done ZTXU:887453318:::3}    Treatments at the Time of Hospital Discharge:   Respiratory Treatments: ***  Oxygen Therapy:  {Therapy; copd oxygen:33536:::0}  Ventilator:    {[facility] Vent List:660207465:::0}    Rehab Therapies: {THERAPEUTIC INTERVENTION:3870927155}  Weight Bearing Status/Restrictions: [address] Weight Bearin:::0}  Other Medical Equipment (for information only, NOT a DME order):  {EQUIPMENT:279612448}  Other Treatments: ***    Patient's personal belongings (please select all that are sent with patient):  {P DME Belongings:546498073:::0}    RN SIGNATURE:  {Esignature:158742981:::0}    CASE MANAGEMENT/SOCIAL WORK SECTION    Inpatient Status Date: ***    Readmission Risk Assessment Score:  Readmission Risk              Risk of Unplanned Readmission:  8           Discharging to Facility/ Agency   Name:   Address:  Phone:  Fax:    Dialysis Facility (if applicable)   Name:  Address:  Dialysis Schedule:  Phone:  Fax:    / signature: {Esignature:952123921:::0}    PHYSICIAN SECTION    Prognosis: {Prognosis:5030067714:::0}    Condition at Discharge: Medhat Pompa Patient Condition:070125896:::0}    Rehab Potential (if transferring to Rehab): {Prognosis:8624364056:::0}    Recommended Labs or Other Treatments After Discharge: ***    Physician Certification: I certify the above information and transfer of Raquel Bartlett  is necessary for the continuing treatment of the diagnosis listed and that she requires {Admit to Appropriate Level of Care:66678:::0} for {GREATER/LESS:041316563} 30 days.      Update Admission H&P: {CHP DME Changes in HandP:673692131:::0}    PHYSICIAN SIGNATURE:  {Esignature:552608195:::0} Statement Selected

## 2023-02-17 NOTE — HISTORY OF PRESENT ILLNESS
[FreeTextEntry1] : Feb 14, 2023     in person   - fasting\par \par PCP:  Dr. Seth Sheldon\par          [facility]  [address] (fibroids) \par          Pulm:  Dr. 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[employer].\par Told of prediabetes\par \par : :\par Constitutional:  Alert, well nourished, healthy appearance, no acute distress \par Eyes:  No proptosis, no stare\par Thyroid:\par Pulmonary:  No respiratory distress, no accessory muscle use; normal rate and effort\par Cardiac:  Normal rate\par Vascular: \par Endocrine:  No stigmata of Cushing’s Syndrome\par Musculoskeletal:  Normal gait, no involuntary movements\par Neurology:  No tremors\par Affect/Mood/Psych:  Oriented x 3; normal affect, normal insight/judgement, normal mood \par .\par \par Impression:  Reports she is doing well.\par Plan: Updated A1c.\par Same Rx.  \par \par \par Sep 28, 2022 \par \par PCP:  Dr. 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 Told of prediabetes\par \par : :\par Constitutional:  Alert, well nourished, healthy appearance, no acute distress \par Eyes:  No proptosis, no stare\par Thyroid:\par Pulmonary:  No respiratory distress, no accessory muscle use; normal rate and effort\par Cardiac:  Normal rate\par Vascular: \par Endocrine:  No stigmata of Cushing’s Syndrome\par Musculoskeletal:  Normal gait, no involuntary movements\par Neurology:  No tremors\par Affect/Mood/Psych:  Oriented x 3; normal affect, normal insight/judgement, normal mood \par .\par \par Plan:  A1c and ROV in February \par Start metformin 500 mg in PM

## 2023-02-19 LAB
ALBUMIN SERPL ELPH-MCNC: 4.5 G/DL
ALP BLD-CCNC: 76 U/L
ALT SERPL-CCNC: 28 U/L
ANION GAP SERPL CALC-SCNC: 19 MMOL/L
AST SERPL-CCNC: 25 U/L
BASOPHILS # BLD AUTO: 0.03 K/UL
BASOPHILS NFR BLD AUTO: 0.8 %
BILIRUB DIRECT SERPL-MCNC: 0.1 MG/DL
BILIRUB INDIRECT SERPL-MCNC: 0.2 MG/DL
BILIRUB SERPL-MCNC: 0.3 MG/DL
BUN SERPL-MCNC: 13 MG/DL
CALCIUM SERPL-MCNC: 10.2 MG/DL
CHLORIDE SERPL-SCNC: 100 MMOL/L
CO2 SERPL-SCNC: 17 MMOL/L
CORTIS SERPL-MCNC: 7.3 UG/DL
CREAT SERPL-MCNC: 0.86 MG/DL
EGFR: 83 ML/MIN/1.73M2
EOSINOPHIL # BLD AUTO: 0.18 K/UL
EOSINOPHIL NFR BLD AUTO: 4.7 %
ESTIMATED AVERAGE GLUCOSE: 134 MG/DL
FRUCTOSAMINE SERPL-MCNC: 265 UMOL/L
GLUCOSE SERPL-MCNC: 114 MG/DL
HBA1C MFR BLD HPLC: 6.3 %
HCT VFR BLD CALC: 48.9 %
HGB BLD-MCNC: 16.4 G/DL
IMM GRANULOCYTES NFR BLD AUTO: 0.3 %
INSULIN SERPL-MCNC: 9.2 UU/ML
LYMPHOCYTES # BLD AUTO: 1.64 K/UL
LYMPHOCYTES NFR BLD AUTO: 42.5 %
MAN DIFF?: NORMAL
MCHC RBC-ENTMCNC: 31 PG
MCHC RBC-ENTMCNC: 33.5 GM/DL
MCV RBC AUTO: 92.4 FL
MONOCYTES # BLD AUTO: 0.28 K/UL
MONOCYTES NFR BLD AUTO: 7.3 %
NEUTROPHILS # BLD AUTO: 1.72 K/UL
NEUTROPHILS NFR BLD AUTO: 44.4 %
PLATELET # BLD AUTO: 323 K/UL
POTASSIUM SERPL-SCNC: 4.9 MMOL/L
PROT SERPL-MCNC: 7.8 G/DL
RBC # BLD: 5.29 M/UL
RBC # FLD: 13.5 %
SODIUM SERPL-SCNC: 136 MMOL/L
WBC # FLD AUTO: 3.86 K/UL

## 2023-02-21 ENCOUNTER — APPOINTMENT (OUTPATIENT)
Dept: FAMILY MEDICINE | Facility: CLINIC | Age: 48
End: 2023-02-21
Payer: COMMERCIAL

## 2023-02-21 VITALS
DIASTOLIC BLOOD PRESSURE: 78 MMHG | SYSTOLIC BLOOD PRESSURE: 130 MMHG | WEIGHT: 225 LBS | OXYGEN SATURATION: 98 % | HEART RATE: 82 BPM | BODY MASS INDEX: 35.31 KG/M2 | HEIGHT: 67 IN

## 2023-02-21 DIAGNOSIS — R73.09 OTHER ABNORMAL GLUCOSE: ICD-10-CM

## 2023-02-21 DIAGNOSIS — R06.00 DYSPNEA, UNSPECIFIED: ICD-10-CM

## 2023-02-21 DIAGNOSIS — Z76.89 PERSONS ENCOUNTERING HEALTH SERVICES IN OTHER SPECIFIED CIRCUMSTANCES: ICD-10-CM

## 2023-02-21 DIAGNOSIS — E78.00 PURE HYPERCHOLESTEROLEMIA, UNSPECIFIED: ICD-10-CM

## 2023-02-21 DIAGNOSIS — Z00.00 ENCOUNTER FOR GENERAL ADULT MEDICAL EXAMINATION W/OUT ABNORMAL FINDINGS: ICD-10-CM

## 2023-02-21 DIAGNOSIS — D75.1 SECONDARY POLYCYTHEMIA: ICD-10-CM

## 2023-02-21 PROCEDURE — 99386 PREV VISIT NEW AGE 40-64: CPT | Mod: 25

## 2023-02-21 PROCEDURE — 99396 PREV VISIT EST AGE 40-64: CPT

## 2023-02-21 RX ORDER — APIXABAN 5 MG/1
5 TABLET, FILM COATED ORAL
Qty: 60 | Refills: 6 | Status: DISCONTINUED | COMMUNITY
Start: 2020-07-13 | End: 2023-02-21

## 2023-02-21 NOTE — HISTORY OF PRESENT ILLNESS
[FreeTextEntry1] : Annual [de-identified] : Ms. LAKHWINDER RAMOS is a 48 year old female here today for her annual.\par Went to ER and had a CT that she had emphysema.\par Chronic snorer- has been told to get a sleep study, \par Covid x 2\par Flu: No\par

## 2023-02-21 NOTE — REVIEW OF SYSTEMS
[Negative] : Psychiatric [Recent Change In Weight] : ~T no recent weight change [FreeTextEntry6] : chronic snoring

## 2023-02-21 NOTE — PHYSICAL EXAM
[No Acute Distress] : no acute distress [Well Nourished] : well nourished [Normal Oropharynx] : the oropharynx was normal [Normal TMs] : both tympanic membranes were normal [No Lymphadenopathy] : no lymphadenopathy [Thyroid Normal, No Nodules] : the thyroid was normal and there were no nodules present [Clear to Auscultation] : lungs were clear to auscultation bilaterally [Normal] : normal rate, regular rhythm, normal S1 and S2 and no murmur heard

## 2023-02-21 NOTE — HEALTH RISK ASSESSMENT
[Fair] : ~his/her~ current health as fair  [Good] : ~his/her~  mood as  good [No falls in past year] : Patient reported no falls in the past year [0] : 2) Feeling down, depressed, or hopeless: Not at all (0) [Patient reported mammogram was normal] : Patient reported mammogram was normal [Patient reported PAP Smear was normal] : Patient reported PAP Smear was normal [Patient reported colonoscopy was normal] : Patient reported colonoscopy was normal [HIV Test offered] : HIV Test offered [Hepatitis C test offered] : Hepatitis C test offered [None] : None [Alone] : lives alone [Employed] : employed [Single] : single [Feels Safe at Home] : Feels safe at home [Fully functional (bathing, dressing, toileting, transferring, walking, feeding)] : Fully functional (bathing, dressing, toileting, transferring, walking, feeding) [Fully functional (using the telephone, shopping, preparing meals, housekeeping, doing laundry, using] : Fully functional and needs no help or supervision to perform IADLs (using the telephone, shopping, preparing meals, housekeeping, doing laundry, using transportation, managing medications and managing finances) [Reports normal functional visual acuity (ie: able to read med bottle)] : Reports normal functional visual acuity [Smoke Detector] : smoke detector [Carbon Monoxide Detector] : carbon monoxide detector [Safety elements used in home] : safety elements used in home [Seat Belt] :  uses seat belt [No] : No [Never] : Never [de-identified] : walking [de-identified] : normal [EyeExamDate] : 02/22 [Change in mental status noted] : No change in mental status noted [Language] : denies difficulty with language [Behavior] : denies difficulty with behavior [Learning/Retaining New Information] : denies difficulty learning/retaining new information [Handling Complex Tasks] : denies difficulty handling complex tasks [Reasoning] : denies difficulty with reasoning [Spatial Ability and Orientation] : denies difficulty with spatial ability and orientation [Reports changes in hearing] : Reports no changes in hearing [Reports changes in vision] : Reports no changes in vision [Reports changes in dental health] : Reports no changes in dental health [Guns at Home] : no guns at home [Sunscreen] : does not use sunscreen [Travel to Developing Areas] : does not  travel to developing areas [TB Exposure] : is not being exposed to tuberculosis [Caregiver Concerns] : does not have caregiver concerns [MammogramDate] : 09/22 [PapSmearDate] : 02/23 [ColonoscopyDate] : 01/08 [de-identified] : 02/23 [de-identified] : 01/22

## 2023-02-22 ENCOUNTER — APPOINTMENT (OUTPATIENT)
Dept: PULMONOLOGY | Facility: CLINIC | Age: 48
End: 2023-02-22
Payer: COMMERCIAL

## 2023-02-22 VITALS
TEMPERATURE: 97.7 F | WEIGHT: 221 LBS | HEIGHT: 67 IN | DIASTOLIC BLOOD PRESSURE: 88 MMHG | BODY MASS INDEX: 34.69 KG/M2 | OXYGEN SATURATION: 98 % | SYSTOLIC BLOOD PRESSURE: 139 MMHG | HEART RATE: 110 BPM

## 2023-02-22 DIAGNOSIS — Z86.711 PERSONAL HISTORY OF PULMONARY EMBOLISM: ICD-10-CM

## 2023-02-22 PROCEDURE — 99214 OFFICE O/P EST MOD 30 MIN: CPT

## 2023-02-22 NOTE — ASSESSMENT
[FreeTextEntry1] : Snoring\par I discussed the short and long term health effect of the obstructive seep apnea with the patient. These effects include, but not limited to, uncontrolled hypertension, CAD, arrhythmias, sudden death, CVA, and pulmonary hypertension. I advised the patient to avoid sedatives, narcotics, driving, and sleeping pills in the meantime. I discussed the therapeutic options including but not limited to CPAP, surgery, and oral appliance. Further recommendations will follow after sleep study.\par \par History of pulmonary embolism\par \par The patient was in the emergency room.  The CT scan was negative for thromboembolic disease.  The previous venous Doppler and VQ scan were unremarkable.  The report indicated that there is emphysema.  I did not improve she had any emphysematous changes where the paraseptal or centrilobular.  There are some areas somewhat of mosaic appearance was my concern is underlying pulmonary hypertension.  We will follow-up with PFT echocardiogram and sleep study

## 2023-02-22 NOTE — HISTORY OF PRESENT ILLNESS
[TextBox_4] : She went to the ER once told that she has emphysema on the CT scan of the chest.  She has gotten nervous about it.  The reason she wanted it is just to make sure she does not have a blood clot.  She has no shortness of breath coughing wheezingshe is feeling well.  [ESS] : 0

## 2023-02-24 ENCOUNTER — APPOINTMENT (OUTPATIENT)
Dept: PULMONOLOGY | Facility: CLINIC | Age: 48
End: 2023-02-24
Payer: COMMERCIAL

## 2023-02-24 PROCEDURE — 94729 DIFFUSING CAPACITY: CPT

## 2023-02-24 PROCEDURE — 94727 GAS DIL/WSHOT DETER LNG VOL: CPT

## 2023-02-24 PROCEDURE — 94010 BREATHING CAPACITY TEST: CPT

## 2023-02-26 LAB — DHEA-SULFATE, SERUM: 133 UG/DL

## 2023-02-27 ENCOUNTER — APPOINTMENT (OUTPATIENT)
Dept: PULMONOLOGY | Facility: CLINIC | Age: 48
End: 2023-02-27
Payer: COMMERCIAL

## 2023-02-27 VITALS
HEART RATE: 103 BPM | TEMPERATURE: 98.06 F | SYSTOLIC BLOOD PRESSURE: 131 MMHG | BODY MASS INDEX: 34.84 KG/M2 | HEIGHT: 67 IN | WEIGHT: 222 LBS | OXYGEN SATURATION: 97 % | RESPIRATION RATE: 12 BRPM | DIASTOLIC BLOOD PRESSURE: 80 MMHG

## 2023-02-27 DIAGNOSIS — R93.89 ABNORMAL FINDINGS ON DIAGNOSTIC IMAGING OF OTHER SPECIFIED BODY STRUCTURES: ICD-10-CM

## 2023-02-27 DIAGNOSIS — R06.83 SNORING: ICD-10-CM

## 2023-02-27 LAB
BASOPHILS # BLD AUTO: 0.03 K/UL
BASOPHILS NFR BLD AUTO: 0.6 %
CHOLEST SERPL-MCNC: 223 MG/DL
EOSINOPHIL # BLD AUTO: 0.12 K/UL
EOSINOPHIL NFR BLD AUTO: 2.5 %
HCT VFR BLD CALC: 47.1 %
HDLC SERPL-MCNC: 53 MG/DL
HGB BLD-MCNC: 15.1 G/DL
IMM GRANULOCYTES NFR BLD AUTO: 0.2 %
LDLC SERPL CALC-MCNC: 151 MG/DL
LYMPHOCYTES # BLD AUTO: 2.17 K/UL
LYMPHOCYTES NFR BLD AUTO: 45.7 %
MAN DIFF?: NORMAL
MCHC RBC-ENTMCNC: 30.4 PG
MCHC RBC-ENTMCNC: 32.1 GM/DL
MCV RBC AUTO: 94.8 FL
MONOCYTES # BLD AUTO: 0.27 K/UL
MONOCYTES NFR BLD AUTO: 5.7 %
NEUTROPHILS # BLD AUTO: 2.15 K/UL
NEUTROPHILS NFR BLD AUTO: 45.3 %
NONHDLC SERPL-MCNC: 170 MG/DL
PLATELET # BLD AUTO: 349 K/UL
RBC # BLD: 4.97 M/UL
RBC # FLD: 13 %
TRIGL SERPL-MCNC: 95 MG/DL
WBC # FLD AUTO: 4.75 K/UL

## 2023-02-27 PROCEDURE — 99213 OFFICE O/P EST LOW 20 MIN: CPT

## 2023-02-27 NOTE — ASSESSMENT
[FreeTextEntry1] : Snoring\par \par The patient is scheduled for sleep study\par \par Discussed with the patient the abnormal CT scan finding.  The PFT was consistent with mild/moderate restrictive lung disease with normal diffusion capacity.  Most likely related to her truncal obesity.  No clinical evidence of underlying obstructive lung disease on the PFT.  The patient is scheduled for the sleep study to rule out underlying obstructive sleep apnea.  The patient is also scheduled for echocardiogram.  I will reviewed the CT scan of the chest with the radiologist whether this is more consistent with Zenk appearance and more consistent with old chronic thromboembolic disease.  Patient might need a VQ scan.  I discussed the PFT in details with the patient I gave a copy of the PFT

## 2023-02-28 DIAGNOSIS — E55.9 VITAMIN D DEFICIENCY, UNSPECIFIED: ICD-10-CM

## 2023-02-28 DIAGNOSIS — Z11.59 ENCOUNTER FOR SCREENING FOR OTHER VIRAL DISEASES: ICD-10-CM

## 2023-02-28 RX ORDER — MULTIVIT-MIN/IRON/FOLIC ACID/K 18-600-40
50 MCG CAPSULE ORAL
Qty: 90 | Refills: 2 | Status: ACTIVE | COMMUNITY
Start: 2023-02-28 | End: 1900-01-01

## 2023-03-02 ENCOUNTER — APPOINTMENT (OUTPATIENT)
Dept: FAMILY MEDICINE | Facility: CLINIC | Age: 48
End: 2023-03-02

## 2023-03-08 ENCOUNTER — APPOINTMENT (OUTPATIENT)
Dept: HEART AND VASCULAR | Facility: CLINIC | Age: 48
End: 2023-03-08

## 2023-03-10 ENCOUNTER — APPOINTMENT (OUTPATIENT)
Dept: SLEEP CENTER | Facility: HOSPITAL | Age: 48
End: 2023-03-10

## 2023-03-31 ENCOUNTER — APPOINTMENT (OUTPATIENT)
Dept: SLEEP CENTER | Facility: HOSPITAL | Age: 48
End: 2023-03-31

## 2023-04-03 NOTE — ED PROVIDER NOTE - INTERPRETATION
[FreeTextEntry2] : Patient is coming in today neck, back, left shoulder, left elbow, left foot and ankle and left knee pain. 
normal sinus rhythm

## 2023-04-07 ENCOUNTER — OUTPATIENT (OUTPATIENT)
Dept: OUTPATIENT SERVICES | Facility: HOSPITAL | Age: 48
LOS: 1 days | End: 2023-04-07

## 2023-04-07 DIAGNOSIS — Z98.890 OTHER SPECIFIED POSTPROCEDURAL STATES: Chronic | ICD-10-CM

## 2023-04-07 DIAGNOSIS — R06.02 SHORTNESS OF BREATH: ICD-10-CM

## 2023-04-07 DIAGNOSIS — R06.83 SNORING: ICD-10-CM

## 2023-04-15 ENCOUNTER — APPOINTMENT (OUTPATIENT)
Dept: NUCLEAR MEDICINE | Facility: HOSPITAL | Age: 48
End: 2023-04-15

## 2023-05-19 ENCOUNTER — APPOINTMENT (OUTPATIENT)
Dept: ENDOCRINOLOGY | Facility: CLINIC | Age: 48
End: 2023-05-19

## 2023-05-19 ENCOUNTER — NON-APPOINTMENT (OUTPATIENT)
Age: 48
End: 2023-05-19

## 2023-06-02 ENCOUNTER — APPOINTMENT (OUTPATIENT)
Dept: ENDOCRINOLOGY | Facility: CLINIC | Age: 48
End: 2023-06-02
Payer: COMMERCIAL

## 2023-06-02 VITALS
BODY MASS INDEX: 34.06 KG/M2 | HEART RATE: 84 BPM | DIASTOLIC BLOOD PRESSURE: 78 MMHG | SYSTOLIC BLOOD PRESSURE: 112 MMHG | OXYGEN SATURATION: 97 % | HEIGHT: 67 IN | WEIGHT: 217 LBS

## 2023-06-02 DIAGNOSIS — R73.03 PREDIABETES.: ICD-10-CM

## 2023-06-02 DIAGNOSIS — E66.9 OBESITY, UNSPECIFIED: ICD-10-CM

## 2023-06-02 PROCEDURE — 99215 OFFICE O/P EST HI 40 MIN: CPT

## 2023-06-02 RX ORDER — FLASH GLUCOSE SENSOR
KIT MISCELLANEOUS
Qty: 1 | Refills: 0 | Status: ACTIVE | COMMUNITY
Start: 2023-06-02 | End: 1900-01-01

## 2023-06-04 NOTE — HISTORY OF PRESENT ILLNESS
[FreeTextEntry1] : Jun 02, 2023     in person   - scheduled for 7:15 am  \par \par PCP:  Dr. Seth Sheldon\par          Gyn: Kevin Omalley in [facility]  [address] (fibroids) \par          Pulm:  Dr. 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[employer].\par Has been told of prediabetes.\par By 2/14/2024 A1c up to 6.3% from previous 5.8%\par \par : :\par Constitutional:  Alert, well nourished, healthy appearance, no acute distress \par Eyes:  No proptosis, no stare\par Thyroid:\par Pulmonary:  No respiratory distress, no accessory muscle use; normal rate and effort\par Cardiac:  Normal rate\par Vascular: \par Endocrine:  No stigmata of Cushing’s Syndrome\par Musculoskeletal:  Normal gait, no involuntary movements\par Neurology:  No tremors\par Affect/Mood/Psych:  Oriented x 3; normal affect, normal insight/judgement, normal mood \par .\par \par Impression:  She needs a means to monitor her sugars as A1c trajectory is up.\par She is also interested in use of a GLP-1 agonist as suggested to her by her ?cardiologist>\par \par Plan:  Today instructed in use of Hima 2.  \par \par \par \par Feb 14, 2023     in person   - fasting\par \par PCP:  Dr. Seth Sheldon\par          Gyn: Kevin Omalley in [facility]  [address] (fibroids) \par          Pulm:  Dr. 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\par Same Rx.  \par \par \par Sep 28, 2022 \par \par PCP:  Dr. 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 Told of prediabetes\par \par : :\par Constitutional:  Alert, well nourished, healthy appearance, no acute distress \par Eyes:  No proptosis, no stare\par Thyroid:\par Pulmonary:  No respiratory distress, no accessory muscle use; normal rate and effort\par Cardiac:  Normal rate\par Vascular: \par Endocrine:  No stigmata of Cushing’s Syndrome\par Musculoskeletal:  Normal gait, no involuntary movements\par Neurology:  No tremors\par Affect/Mood/Psych:  Oriented x 3; normal affect, normal insight/judgement, normal mood \par .\par \par Plan:  A1c and ROV in February \par Start metformin 500 mg in PM

## 2024-01-11 NOTE — ED ADULT TRIAGE NOTE - PAIN RATING/NUMBER SCALE (0-10): ACTIVITY
[FreeTextEntry1] : Patient is a 52 yo M w/ PMHx hiatal hernia, HLD, pre-diabetes (on diabetic agent, does not know which one), anxiety, back pain, elevated transaminases presenting for evaluation of generalized abdominal pain x 2 months.   Patient states the pain started about 2 months ago. It has no clear inciting factors, though he thinks it is worse when lying down. Pain is constant, sharp, sometimes travels to the back. He also endorses nausea without vomiting, GERD, and occasional dark blood per rectum after consuming hot peppers. He denies weight loss, fevers, chills, dysphagia, odynophagia, constipation, diarrhea.  He went to the ED for this pain in 2023 at which time he had a CT A/P IVC that was normal. Labs were notable for elevated AST/ALT (78/140) which he has had in the past. States he has a hx of heavy etoh use in the past but not for many years now. No known hx of hepatitis. No hx of liver disease in the family.   He has established care at a GI clinic affiliated with [facility] and underwent an EGD/colonoscopy a year ago which he states was performed for the reported GI bleeding and abdominal pain. He states these were normal except for a hiatal hernia. He says he was told this was not the cause for his symptoms.  He takes a PPI for his GERD symptoms which helps.   Of note, patient also endorses chest pain and SOB that occurs when he walks a few blocks. He states his father  of a heart attack when he was in his 50s.   He has no known family hx of GI issues or malignancy.   
5

## 2024-02-23 ENCOUNTER — EMERGENCY (EMERGENCY)
Facility: HOSPITAL | Age: 49
LOS: 1 days | Discharge: HOME | End: 2024-02-23
Attending: EMERGENCY MEDICINE | Admitting: EMERGENCY MEDICINE
Payer: COMMERCIAL

## 2024-02-23 VITALS
HEART RATE: 86 BPM | TEMPERATURE: 98 F | SYSTOLIC BLOOD PRESSURE: 136 MMHG | OXYGEN SATURATION: 97 % | RESPIRATION RATE: 18 BRPM | DIASTOLIC BLOOD PRESSURE: 89 MMHG

## 2024-02-23 VITALS
RESPIRATION RATE: 18 BRPM | TEMPERATURE: 98 F | HEART RATE: 96 BPM | DIASTOLIC BLOOD PRESSURE: 93 MMHG | SYSTOLIC BLOOD PRESSURE: 161 MMHG | OXYGEN SATURATION: 96 %

## 2024-02-23 DIAGNOSIS — Z98.890 OTHER SPECIFIED POSTPROCEDURAL STATES: Chronic | ICD-10-CM

## 2024-02-23 LAB
ALBUMIN SERPL ELPH-MCNC: 4.5 G/DL — SIGNIFICANT CHANGE UP (ref 3.3–5)
ALP SERPL-CCNC: 52 U/L — SIGNIFICANT CHANGE UP (ref 40–120)
ALT FLD-CCNC: 12 U/L — SIGNIFICANT CHANGE UP (ref 10–45)
ANION GAP SERPL CALC-SCNC: 12 MMOL/L — SIGNIFICANT CHANGE UP (ref 5–17)
AST SERPL-CCNC: 17 U/L — SIGNIFICANT CHANGE UP (ref 10–40)
BASOPHILS # BLD AUTO: 0.02 K/UL — SIGNIFICANT CHANGE UP (ref 0–0.2)
BASOPHILS NFR BLD AUTO: 0.4 % — SIGNIFICANT CHANGE UP (ref 0–2)
BILIRUB SERPL-MCNC: 0.4 MG/DL — SIGNIFICANT CHANGE UP (ref 0.2–1.2)
BUN SERPL-MCNC: 8 MG/DL — SIGNIFICANT CHANGE UP (ref 7–23)
C DIFF GDH STL QL: NEGATIVE — SIGNIFICANT CHANGE UP
C DIFF GDH STL QL: SIGNIFICANT CHANGE UP
CALCIUM SERPL-MCNC: 9.1 MG/DL — SIGNIFICANT CHANGE UP (ref 8.4–10.5)
CHLORIDE SERPL-SCNC: 101 MMOL/L — SIGNIFICANT CHANGE UP (ref 96–108)
CO2 SERPL-SCNC: 25 MMOL/L — SIGNIFICANT CHANGE UP (ref 22–31)
CREAT SERPL-MCNC: 0.97 MG/DL — SIGNIFICANT CHANGE UP (ref 0.5–1.3)
EGFR: 72 ML/MIN/1.73M2 — SIGNIFICANT CHANGE UP
EOSINOPHIL # BLD AUTO: 0.29 K/UL — SIGNIFICANT CHANGE UP (ref 0–0.5)
EOSINOPHIL NFR BLD AUTO: 6 % — SIGNIFICANT CHANGE UP (ref 0–6)
GI PCR PANEL: SIGNIFICANT CHANGE UP
GLUCOSE SERPL-MCNC: 82 MG/DL — SIGNIFICANT CHANGE UP (ref 70–99)
HCT VFR BLD CALC: 47.1 % — HIGH (ref 34.5–45)
HGB BLD-MCNC: 15.6 G/DL — HIGH (ref 11.5–15.5)
IMM GRANULOCYTES NFR BLD AUTO: 0.2 % — SIGNIFICANT CHANGE UP (ref 0–0.9)
LIDOCAIN IGE QN: 27 U/L — SIGNIFICANT CHANGE UP (ref 7–60)
LYMPHOCYTES # BLD AUTO: 1.72 K/UL — SIGNIFICANT CHANGE UP (ref 1–3.3)
LYMPHOCYTES # BLD AUTO: 35.5 % — SIGNIFICANT CHANGE UP (ref 13–44)
MCHC RBC-ENTMCNC: 30.8 PG — SIGNIFICANT CHANGE UP (ref 27–34)
MCHC RBC-ENTMCNC: 33.1 GM/DL — SIGNIFICANT CHANGE UP (ref 32–36)
MCV RBC AUTO: 92.9 FL — SIGNIFICANT CHANGE UP (ref 80–100)
MONOCYTES # BLD AUTO: 0.26 K/UL — SIGNIFICANT CHANGE UP (ref 0–0.9)
MONOCYTES NFR BLD AUTO: 5.4 % — SIGNIFICANT CHANGE UP (ref 2–14)
NEUTROPHILS # BLD AUTO: 2.54 K/UL — SIGNIFICANT CHANGE UP (ref 1.8–7.4)
NEUTROPHILS NFR BLD AUTO: 52.5 % — SIGNIFICANT CHANGE UP (ref 43–77)
NRBC # BLD: 0 /100 WBCS — SIGNIFICANT CHANGE UP (ref 0–0)
PLATELET # BLD AUTO: 362 K/UL — SIGNIFICANT CHANGE UP (ref 150–400)
POTASSIUM SERPL-MCNC: 3.9 MMOL/L — SIGNIFICANT CHANGE UP (ref 3.5–5.3)
POTASSIUM SERPL-SCNC: 3.9 MMOL/L — SIGNIFICANT CHANGE UP (ref 3.5–5.3)
PROT SERPL-MCNC: 7.2 G/DL — SIGNIFICANT CHANGE UP (ref 6–8.3)
RBC # BLD: 5.07 M/UL — SIGNIFICANT CHANGE UP (ref 3.8–5.2)
RBC # FLD: 13 % — SIGNIFICANT CHANGE UP (ref 10.3–14.5)
SODIUM SERPL-SCNC: 138 MMOL/L — SIGNIFICANT CHANGE UP (ref 135–145)
WBC # BLD: 4.84 K/UL — SIGNIFICANT CHANGE UP (ref 3.8–10.5)
WBC # FLD AUTO: 4.84 K/UL — SIGNIFICANT CHANGE UP (ref 3.8–10.5)

## 2024-02-23 PROCEDURE — 87449 NOS EACH ORGANISM AG IA: CPT

## 2024-02-23 PROCEDURE — 36415 COLL VENOUS BLD VENIPUNCTURE: CPT

## 2024-02-23 PROCEDURE — 96374 THER/PROPH/DIAG INJ IV PUSH: CPT

## 2024-02-23 PROCEDURE — 85025 COMPLETE CBC W/AUTO DIFF WBC: CPT

## 2024-02-23 PROCEDURE — 83690 ASSAY OF LIPASE: CPT

## 2024-02-23 PROCEDURE — 99284 EMERGENCY DEPT VISIT MOD MDM: CPT | Mod: 25

## 2024-02-23 PROCEDURE — 87324 CLOSTRIDIUM AG IA: CPT

## 2024-02-23 PROCEDURE — 87507 IADNA-DNA/RNA PROBE TQ 12-25: CPT

## 2024-02-23 PROCEDURE — 80053 COMPREHEN METABOLIC PANEL: CPT

## 2024-02-23 PROCEDURE — 99284 EMERGENCY DEPT VISIT MOD MDM: CPT

## 2024-02-23 RX ORDER — SODIUM CHLORIDE 9 MG/ML
1000 INJECTION INTRAMUSCULAR; INTRAVENOUS; SUBCUTANEOUS ONCE
Refills: 0 | Status: COMPLETED | OUTPATIENT
Start: 2024-02-23 | End: 2024-02-23

## 2024-02-23 RX ORDER — ACETAMINOPHEN 500 MG
650 TABLET ORAL ONCE
Refills: 0 | Status: COMPLETED | OUTPATIENT
Start: 2024-02-23 | End: 2024-02-23

## 2024-02-23 RX ORDER — KETOROLAC TROMETHAMINE 30 MG/ML
15 SYRINGE (ML) INJECTION ONCE
Refills: 0 | Status: DISCONTINUED | OUTPATIENT
Start: 2024-02-23 | End: 2024-02-23

## 2024-02-23 RX ADMIN — SODIUM CHLORIDE 1000 MILLILITER(S): 9 INJECTION INTRAMUSCULAR; INTRAVENOUS; SUBCUTANEOUS at 18:47

## 2024-02-23 RX ADMIN — SODIUM CHLORIDE 1000 MILLILITER(S): 9 INJECTION INTRAMUSCULAR; INTRAVENOUS; SUBCUTANEOUS at 20:14

## 2024-02-23 RX ADMIN — Medication 20 MILLIGRAM(S): at 18:45

## 2024-02-23 RX ADMIN — Medication 650 MILLIGRAM(S): at 21:25

## 2024-02-23 RX ADMIN — Medication 15 MILLIGRAM(S): at 18:46

## 2024-02-23 NOTE — ED PROVIDER NOTE - ATTENDING APP SHARED VISIT CONTRIBUTION OF CARE
a few days loose watery diarrhea. associated abd cramps. no vomiting, fever. no blood. well appearing, soft non tender abdomen. labs with normal lytes, wbc. given ivf, pain control, feels better. stool studies sent. to continue supportive care. return precautions discussed

## 2024-02-23 NOTE — ED PROVIDER NOTE - PATIENT PORTAL LINK FT
You can access the FollowMyHealth Patient Portal offered by [facility] by registering at the following website: [email or web address]. By joining [facility]’s FollowMyHealth portal, you will also be able to view your health information using other applications (apps) compatible with our system.

## 2024-02-23 NOTE — ED PROVIDER NOTE - NSICDXPASTMEDICALHX_GEN_ALL_CORE_FT
PAST MEDICAL HISTORY:  Anemia     Arthritis     Menorrhagia     Mitral valve regurgitation     Pulmonary embolism

## 2024-02-23 NOTE — ED PROVIDER NOTE - NS ED ROS FT
Constitutional: No fever. No chills.  Eyes: No redness. No discharge. No vision change.   ENT: No sore throat. No ear pain.  Cardiovascular: No chest pain. No leg swelling.  Respiratory: No cough. No shortness of breath.  GI: No abdominal pain. No vomiting. +diarrhea.   MSK: No joint pain. No back pain.   Skin: No rash. No abrasions.   Neuro: No numbness. No weakness.   Psych: No known mental health issues.

## 2024-02-23 NOTE — ED PROVIDER NOTE - OBJECTIVE STATEMENT
48yo female with no reported pmhx presents with diarrhea. Pt reports onset of diarrhea 3d ago. Associated with lower abdominal cramping prior to having a bowel movement. She denies fever, chills, nausea/vomiting, urinary symptoms. She has taken tylenol, pepto and imodium at home without relief. Onset of symptoms after eating at a potluck dinner.

## 2024-02-23 NOTE — ED ADULT NURSE NOTE - OBJECTIVE STATEMENT
50 y/o F c/o diarrhea x 3 days. Endorses lower abdominal pain, emesis x1 on 02/21/24. Denies chills, fever, chest pain, hematuria, urinary frequency, dysuria, blood in stool. PT A&Ox4, respirations even and unlabored, skin color WDL warm and dry, pt is ambulatory with a steady gait. No acute distress observed.

## 2024-02-23 NOTE — ED PROVIDER NOTE - NSFOLLOWUPINSTRUCTIONS_ED_ALL_ED_FT
Diarrhea    Diarrhea is frequent loose or watery bowel movements that has many causes. Diarrhea can make you feel weak and cause you to become dehydrated. Diarrhea typically lasts 2–3 days, but can last longer if it is a sign of something more serious. Drink clear fluids to prevent dehydration. Eat bland, easy-to-digest foods as tolerated. Take bentyl up to four times daily for cramping. You can take ibuprofen 600mg up to three times daily in addition for cramping and pain.    You will be contacted if your stool results are positive. You can also call [phone] to check on your results.     SEEK IMMEDIATE MEDICAL CARE IF YOU HAVE ANY OF THE FOLLOWING SYMPTOMS: high fevers, lightheadedness/dizziness, chest pain, black or bloody stools, shortness of breath, severe abdominal or back pain, or any signs of dehydration.

## 2024-02-23 NOTE — ED PROVIDER NOTE - CLINICAL SUMMARY MEDICAL DECISION MAKING FREE TEXT BOX
Pt afebrile and hemodynamically stable. She is non-toxic appearing with soft non-tender abdomen on exam. Onset of symptoms after eating at a potluck. She has no leukocytosis. There is no acute metabolic abnormality. Lipase wnl. C. diff neg. GI PCR panel pending. Pt given 2L NS bolus, bentyl and toradol - reports symptomatic improvement. suspect viral vs bacterial etiology which is likely self limited. Counseled on supportive care. Return precautions given.

## 2024-02-27 DIAGNOSIS — R19.7 DIARRHEA, UNSPECIFIED: ICD-10-CM

## 2024-02-27 DIAGNOSIS — Z88.0 ALLERGY STATUS TO PENICILLIN: ICD-10-CM

## 2024-02-27 DIAGNOSIS — R10.30 LOWER ABDOMINAL PAIN, UNSPECIFIED: ICD-10-CM

## 2024-04-11 NOTE — ED PROVIDER NOTE - NS HIV RISK FACTOR YES
CERTIFICATE OF SCHOOL       April 11, 2024      Re: Ryder Burnett  [address] [address]      This is to certify that Ryder Burnett has been under my care from 4/11/2024 and can return to school on 4/12/24    Electronically signed by:  Vishal Najera MD  [facility], [address] [place] [address] 4th [address]  [address] [facility] [address]  Phone: [phone]  Fax: [phone]   Declined

## 2024-08-01 ENCOUNTER — APPOINTMENT (OUTPATIENT)
Dept: OPHTHALMOLOGY | Facility: CLINIC | Age: 49
End: 2024-08-01

## 2024-08-02 NOTE — ED PROVIDER NOTE - CARE PLAN
New Onboarding  Diagnosis: Prostate cancer metastatic to bone [C61, C79.51]      Drug & Non-Drug Allergies:    - EMR Reviewed. No concerning drug or non-drug allergies.                                                                                          Drug Therapy (name/formulation/dose/route of admin/frequency): Abiraterone 1000mg ([id number] tablets) by mouth once daily. + [Prednisone + Leuprolide every 3 months]  EMR Reviewed   - Dose Appropriateness (Y/N): Y   - Renal or Hepatic Adjustments (Y/N): N   - Any comorbidities, PMH, precautions, or contraindications that pose a medication safety concern? (Y/N): N   - Any relevant lab work needed that affects the initial start date? (Y/N): N       - List Past Treatments: Bicalutamide, Lupron   - List DDI’s:     - CatC: Abiraterone + Tamsulosin = may enhance Tamsulosin concentration. Review warnings with patient during initial.    - Patient’s ability to self-administer medication     - No issues identified per EMR   List Goals of Therapy:      - Reduce progression of disease (POD) and/or improve quality of life    - Mitigation of side effects   - Promote optimal medication administration and adherence    - Improve overall survival (OS) and progression free survival (PFS)    - Reduce tumor biomarkers        Initial Assessment **NEW**   Dx: Prostate cancer metastatic to bone [C61, C79.51]      Tx prescribed: Abiraterone 1000mg (5q295rd tablets) by mouth once daily. + [Prednisone + Leuprolide every 3 months]    Administration: patient will take Abiraterone #4 tablets by mouth once daily; planning to take first thing in the morning; reviewed medicaiton should be taken on an empty stomach 1 hour before or 2 hours after a meal; swallow whole    Proper Handling/Disposal: patient will be the only one handling Abiraterone; reminded pt to wash hands before and after handling; advised to take unused medicine back to pharmacy for disposal; do not throw away in trash and do not  "flush down the sink or toilet    Storage/Excursion: Pt will keep in provided packaging before taking daily dose. Reminded to keep at room temperature. Protect from heat, light, and moisture. Keep out of the reach of children and pets.    Emetogenic Potential: minimal to low    Duration of therapy: until progression, toxicity, or no longer clinically beneficial         Adherence & Potential Barriers: adherence predictor score = 0; no adherence barriers identified     Missed dose mgmt: Reviewed if you miss a dose of this medicine, skip the missed dose and go back to your regular dosing schedule. Do not double doses.       List Common, Serious SE & Mitigation Reviewed:     hot flashes (mild exercise program such as walking; find a cool environment; use portable fan; wear layers you can remove)    fatigue (try to do some activity every day; plan/execute activities during the time of day you feel most energetic; maintain adequate protein in diet)    hypertension (notify provider if blood pressure is elevated)    arthralgia (alternate between ice pack (decrease inflammation) and heat pack (improve blood flow; bath with Epsom salts)    nausea/vomiting (BRAT diet; eat and drink slowly)  List Precautions Reviewed:     hepatotoxicity (notify provider if whites of eyes turn yellow, dark \"tea-colored\" urine, light colored stools, loss of appetite for several days or longer, nausea or stomach pain area)    cardiovascular effects (notify provider for tachycardia, chest pain)    fever/illness/infection (notify provider)    adrenal insufficiency (notify provider for extreme fatigue, weight loss, nausea/vomiting, fluid retention)   List Current SE Reviewed (if applicable): N/A as not yet started     Mitigation/mgmt: N/A as not yet started       S/Sx: urinary retention   Clinically Relevant, Abnormal Labs from 7/29/24 unless otherwise noted    CBC: RBC 4.66 (L), ANC 5.43 (WNL)    Chem7: BUN 24 (H), SCr 1.5 (H)    CrCl/GFR: eGFR " 50mL/min    LFTs:  (H)    BP/HR: WNL (8/1/24)    Drug Specific Labs: none additional     Cancer Specific Biomarkers:     ? PSA: 67.9 (H)      Wellness/Lifestyle Counseling: DEFER     Immunizations: recommended PCV20, Shingrix, RSV, hesitant towards most vaccines and strongly decline yearly flu + COVID       Med Rec/Updated drug list:  EMR inaccurate, medication changes reviewed with patient:    (-) Bicalutamide   DI Check:      CatC: Abiraterone + Tamsulosin = may enhance Tamsulosin concentration. Reviewed warnings with patient during initial where patient reported no current side effects to Tamsulosin. He is aware to use caution when getting up at night to urinate.   Supportive PPx Meds: Tamsulosin   Common DI & Dietary Avoidance:     Reviewed to not start any new meds/herbs/OTCs/supplements without speaking to clinic/pharmacist to check for DDIs      Goals of Therapy:     Reduce progression of disease (POD) and/or improve quality of life     Mitigation of side effects    Promote optimal medication administration and adherence     Improve overall survival (OS) and progression free survival (PFS)     Reduce tumor biomarkers  Patient has agreed/understands to goals of therapy during education/counseling    Additional: I spoke with Benedict for new initial counseling on Abiraterone. Counseling points reviewed in detail as noted above. Benedict noted his spouse will be picking up medication today and he plans to start 8/4/24. He shared Bicalutamide was very tough on him for tolerability where he is hopefully Abiraterone won't be as impactful with fatigue/overall malaise. In-basket sent to provider to clarify Prednisone dose with Rx directions for once daily and written for #60 tablets with 11 refills and provider notes detailing twice daily dosing. Benedict will plan to start taking Prednisone once daily when he starts Abiraterone until further direction/clarification is communicated on Prednisone. Benedict noted frequent  nighttime awakenings to urinate. All questions were answered during the call. He welcomes first cycle call from pharmacy after starting.       **Addendum 8/2/24 @ 3:44pm**  Dr. Yang clarified Prednisone dosing for 5mg BID. New Rx has been sent to pt's local [facility]. I reached Benedict to relay dosing clarification with confirmed understanding.    1 Principal Discharge DX:	Diarrhea

## 2024-08-28 NOTE — ED ADULT NURSE NOTE - NS ED NURSE LEVEL OF CONSCIOUSNESS ORIENTATION
2024    Kole Peralta MD  [address] [place] [address]    Patient: Trinh Christopher   YOB: 1952   Date of Visit: 2024     Encounter Diagnosis     ICD-10-CM    1. Osteoarthritis of left hip, unspecified osteoarthritis type  M16.12           Dear Dr. Peralta:    Thank you for your recent referral of Trinh Christopher. Please review the attached evaluation summary from Trinh's recent visit.     Please verify that you agree with the plan of care by signing the attached order.     If you have any questions or concerns, please do not hesitate to call.     I sincerely appreciate the opportunity to share in the care of one of your patients and hope to have another opportunity to work with you in the near future.       Sincerely,    Mansi García, PT      Referring Provider:      I certify that I have read the below Plan of Care and certify the need for these services furnished under this plan of treatment while under my care.                    Kole Peralta MD  [address] [place] [address]  Via Fax: [phone]          PT Evaluation     Today's date: 2024  Patient name: Trinh Christopher  : 1952  MRN: [id number]  Referring provider: Kole Peralta*  Dx:   Encounter Diagnosis     ICD-10-CM    1. Osteoarthritis of left hip, unspecified osteoarthritis type  M16.12           Start Time: 1404  Stop Time: 1445  Total time in clinic (min): 41 minutes    Assessment  Impairments: abnormal gait, abnormal or restricted ROM, activity intolerance, impaired physical strength, lacks appropriate home exercise program and pain with function    Assessment details: Trinh Christopher is a 71 y.o. year old female who presents to  with Osteoarthritis of left hip, unspecified osteoarthritis type  (primary encounter diagnosis). Trinh presents with limitations in left hip range of motion and significant strength deficits in bilateral glutes. She  is currently limited with walking, standing and stair climbing which she is required to do at her job as a . Trinh presents with the impairments as listed above and would benefit from Physical Therapy to address these impairments, improved quality of life and to maximize function.       Goals  Short-Term Goals: 2-4 weeks  1. Patient will report no greater than 3/10 pain in the left hip.  2. Patient will demonstrate improvement in BLE hip strength to at least 4/5.    Long-Term Goals: 4-6 weeks  1. Patient will demonstrate near normal gait pattern without limitation from pain.  2. Patient independent with HEP at time of discharge.       Plan  Patient would benefit from: skilled physical therapy  Planned modality interventions: cryotherapy, TENS and thermotherapy: hydrocollator packs    Frequency: 2x week  Duration in weeks: 8  Plan of Care beginning date: 8/28/2024  Plan of Care expiration date: 10/23/2024  Treatment plan discussed with: patient        Subjective Evaluation    History of Present Illness  Mechanism of injury: Patient is a 71 y.o. presenting to physical therapy with complaints of chronic left hip pain over the last few months. Pain began in the left groin and radiating to the outside of the hip. It can also radiate to the lateral thigh. She began to have difficulty with walking and limping. She received a let hip joint cortisone injection on 7/31 with relief for about 2 weeks.    Imaging: x-rays - osteoarthritis    Next f/u with MD October           Patient Goals  Patient goals for therapy: decreased pain, increased motion, increased strength, independence with ADLs/IADLs and return to sport/leisure activities  Patient goal: help relieve the pain, delay potential surgery, work as a  without limitation  Pain  Current pain rating: 10  At best pain rating: 3  At worst pain rating: 10  Location: left groin and lateral hip  Quality: dull ache, sharp, throbbing and  "radiating (constant ache)  Relieving factors: medications and heat (Tylenol, Advil, Aleve prn)  Aggravating factors: walking, standing and stair climbing    Social Support  Steps to enter house: no  Stairs in house: yes (RHR)   13  Lives in: multiple-level home  Lives with: spouse    Employment status: working (retired- working as a  3-4 days/week)    Diagnostic Tests  X-ray: abnormal  Treatments  Previous treatment: chiropractic  Current treatment: injection treatment, medication and physical therapy        Objective     Active Range of Motion   Left Hip   Flexion: 95 degrees with pain  Abduction: 10 degrees with pain    Passive Range of Motion   Left Hip   Flexion: 95 degrees with pain  External rotation (90/90): WFL  Internal rotation (90/90): WFL    Right Hip   Flexion: WFL  External rotation (90/90): WFL  Internal rotation (90/90): WFL    Strength/Myotome Testing     Left Hip   Planes of Motion   Flexion: 4 (pain)  Extension: 2  Abduction: 3-  External rotation: 4  Internal rotation: 4    Right Hip   Planes of Motion   Flexion: 4  Extension: 2+  Abduction: 3  External rotation: 4+  Internal rotation: 4+    Left Knee   Flexion: 5  Extension: 5    Right Knee   Flexion: 5  Extension: 5    Left Ankle/Foot   Dorsiflexion: 5    Right Ankle/Foot   Dorsiflexion: 5    Tests     Lumbar     Left   Negative passive SLR.     Right   Negative passive SLR.     Left Hip   90/90 SLR: Negative.     Right Hip   90/90 SLR: Positive.              Precautions: HTN, bilateral TKAs      Manuals 8/28            L hip PROM                                                    Neuro Re-Ed             Supine iso clamshells GTB 2x10 b/l            Bridging 2x10            Standing hip abd nv            Standing hip ext nv            TB side stepping nv                                      Ther Ex             Bike - strength/ROM nv            Seated HS stretch 3x20\" b/l            LTR 10x5\"                                    "                Ther Activity             STS w TB at hips for glute drive **            Step ups             Lateral step ups                                       Modalities                                                           Oriented - self; Oriented - place; Oriented - time

## 2024-09-27 NOTE — ED PROVIDER NOTE - CONSTITUTIONAL, MLM
normal... Well appearing, well nourished, awake, alert, oriented to person, place, time/situation and in no apparent distress. .

## 2025-01-08 ENCOUNTER — APPOINTMENT (OUTPATIENT)
Dept: OTOLARYNGOLOGY | Facility: CLINIC | Age: 50
End: 2025-01-08

## 2025-03-11 ENCOUNTER — EMERGENCY (EMERGENCY)
Facility: HOSPITAL | Age: 50
LOS: 1 days | Discharge: HOME | End: 2025-03-11
Attending: EMERGENCY MEDICINE | Admitting: EMERGENCY MEDICINE
Payer: COMMERCIAL

## 2025-03-11 VITALS
SYSTOLIC BLOOD PRESSURE: 131 MMHG | TEMPERATURE: 98 F | HEART RATE: 80 BPM | DIASTOLIC BLOOD PRESSURE: 77 MMHG | RESPIRATION RATE: 18 BRPM | OXYGEN SATURATION: 98 %

## 2025-03-11 DIAGNOSIS — Z98.890 OTHER SPECIFIED POSTPROCEDURAL STATES: Chronic | ICD-10-CM

## 2025-03-11 DIAGNOSIS — D25.1 INTRAMURAL LEIOMYOMA OF UTERUS: ICD-10-CM

## 2025-03-11 DIAGNOSIS — Z87.890 PERSONAL HISTORY OF SEX REASSIGNMENT: ICD-10-CM

## 2025-03-11 DIAGNOSIS — Z86.711 PERSONAL HISTORY OF PULMONARY EMBOLISM: ICD-10-CM

## 2025-03-11 DIAGNOSIS — Z86.718 PERSONAL HISTORY OF OTHER VENOUS THROMBOSIS AND EMBOLISM: ICD-10-CM

## 2025-03-11 DIAGNOSIS — N93.9 ABNORMAL UTERINE AND VAGINAL BLEEDING, UNSPECIFIED: ICD-10-CM

## 2025-03-11 DIAGNOSIS — Z88.0 ALLERGY STATUS TO PENICILLIN: ICD-10-CM

## 2025-03-11 LAB
HCT VFR BLD CALC: 42 % — SIGNIFICANT CHANGE UP (ref 34.5–45)
HGB BLD-MCNC: 14.1 G/DL — SIGNIFICANT CHANGE UP (ref 11.5–15.5)
MCHC RBC-ENTMCNC: 31.7 PG — SIGNIFICANT CHANGE UP (ref 27–34)
MCHC RBC-ENTMCNC: 33.6 G/DL — SIGNIFICANT CHANGE UP (ref 32–36)
MCV RBC AUTO: 94.4 FL — SIGNIFICANT CHANGE UP (ref 80–100)
NRBC BLD AUTO-RTO: 0 /100 WBCS — SIGNIFICANT CHANGE UP (ref 0–0)
PLATELET # BLD AUTO: 344 K/UL — SIGNIFICANT CHANGE UP (ref 150–400)
RBC # BLD: 4.45 M/UL — SIGNIFICANT CHANGE UP (ref 3.8–5.2)
RBC # FLD: 12.8 % — SIGNIFICANT CHANGE UP (ref 10.3–14.5)
WBC # BLD: 6.54 K/UL — SIGNIFICANT CHANGE UP (ref 3.8–10.5)
WBC # FLD AUTO: 6.54 K/UL — SIGNIFICANT CHANGE UP (ref 3.8–10.5)

## 2025-03-11 PROCEDURE — 99284 EMERGENCY DEPT VISIT MOD MDM: CPT

## 2025-03-11 NOTE — ED PROVIDER NOTE - CLINICAL SUMMARY MEDICAL DECISION MAKING FREE TEXT BOX
50-year-old female with vaginal bleeding and cramping secondary to fibroids will evaluate for  anemia plan for ultrasound to delineate  extent of fibroids   CBC BMP type and screen ultrasound transvaginal pelvic exam once patient is in  private room

## 2025-03-11 NOTE — ED ADULT NURSE NOTE - NSFALLUNIVINTERV_ED_ALL_ED
Bed/Stretcher in lowest position, wheels locked, appropriate side rails in place/Call bell, personal items and telephone in reach/Instruct patient to call for assistance before getting out of bed/chair/stretcher/Non-slip footwear applied when patient is off stretcher/[place] to call system/Physically safe environment - no spills, clutter or unnecessary equipment/Purposeful proactive rounding/Room/bathroom lighting operational, light cord in reach

## 2025-03-11 NOTE — ED PROVIDER NOTE - PATIENT PORTAL LINK FT
You can access the FollowMyHealth Patient Portal offered by [facility] by registering at the following website: [email or web address]. By joining [facility]’s FollowMyHealth portal, you will also be able to view your health information using other applications (apps) compatible with our system.

## 2025-03-11 NOTE — ED PROVIDER NOTE - PROGRESS NOTE DETAILS
Patient with large actively bleeding fibroid on ultrasound pain is controlled hemoglobin is stable pelvic exam shows scant blood in vaginal vault no active bleeding has appointment with her GYN on Friday will follow-up is not a candidate for TXA given her history of pulmonary embolism.  Will DC with return precautions. Patient with large  fibroid on ultrasound pain is controlled hemoglobin is stable pelvic exam shows scant blood in vaginal vault no active bleeding, not candidate for txa has appointment with her GYN on Friday will follow-up is not a candidate for TXA given her history of pulmonary embolism.  Will DC with return precautions.

## 2025-03-11 NOTE — ED PROVIDER NOTE - OBJECTIVE STATEMENT
50-year-old female history of DVT PE now off of Eliquis for the past year fibroids here with 1 month of worsening vaginal bleeding with associated lower abdominal pain worsening today with clots.  Pain is described as lower typical of her fibroid type pain.  Has had  embolization of fibroids with GYN here at [facility].  Denies dizziness lightheadedness nausea vomiting diarrhea or chest pain shortness of breath.  Was concerned because today she   Had vaginal bleeding with blood clots coming out.

## 2025-03-11 NOTE — ED PROVIDER NOTE - NSFOLLOWUPCLINICS_GEN_ALL_ED_FT
[facility]  OBGYN  [address] [place] [address]  Phone: [phone]  Fax: [phone]

## 2025-03-11 NOTE — ED ADULT TRIAGE NOTE - CHIEF COMPLAINT QUOTE
pt c/o 2 months of vaginal bleeding with known fibroids and then today developed cramping and clots.

## 2025-03-11 NOTE — ED ADULT NURSE NOTE - OBJECTIVE STATEMENT
Pt is a 50 year old female came in complaining of vaginal bleeding for 2 months. Known hx of uterine fibroids.

## 2025-03-12 LAB
ANION GAP SERPL CALC-SCNC: 11 MMOL/L — SIGNIFICANT CHANGE UP (ref 5–17)
ANION GAP SERPL CALC-SCNC: 13 MMOL/L — SIGNIFICANT CHANGE UP (ref 5–17)
BLD GP AB SCN SERPL QL: NEGATIVE — SIGNIFICANT CHANGE UP
BUN SERPL-MCNC: 9 MG/DL — SIGNIFICANT CHANGE UP (ref 7–23)
BUN SERPL-MCNC: 9 MG/DL — SIGNIFICANT CHANGE UP (ref 7–23)
CALCIUM SERPL-MCNC: 9.1 MG/DL — SIGNIFICANT CHANGE UP (ref 8.4–10.5)
CALCIUM SERPL-MCNC: 9.1 MG/DL — SIGNIFICANT CHANGE UP (ref 8.4–10.5)
CHLORIDE SERPL-SCNC: 100 MMOL/L — SIGNIFICANT CHANGE UP (ref 96–108)
CHLORIDE SERPL-SCNC: 101 MMOL/L — SIGNIFICANT CHANGE UP (ref 96–108)
CO2 SERPL-SCNC: 22 MMOL/L — SIGNIFICANT CHANGE UP (ref 22–31)
CO2 SERPL-SCNC: 23 MMOL/L — SIGNIFICANT CHANGE UP (ref 22–31)
CREAT SERPL-MCNC: 0.64 MG/DL — SIGNIFICANT CHANGE UP (ref 0.5–1.3)
CREAT SERPL-MCNC: 0.65 MG/DL — SIGNIFICANT CHANGE UP (ref 0.5–1.3)
EGFR: 107 ML/MIN/1.73M2 — SIGNIFICANT CHANGE UP
EGFR: 108 ML/MIN/1.73M2 — SIGNIFICANT CHANGE UP
GLUCOSE SERPL-MCNC: 80 MG/DL — SIGNIFICANT CHANGE UP (ref 70–99)
GLUCOSE SERPL-MCNC: 82 MG/DL — SIGNIFICANT CHANGE UP (ref 70–99)
HCG SERPL-ACNC: <1 MIU/ML — SIGNIFICANT CHANGE UP
POTASSIUM SERPL-MCNC: 4.2 MMOL/L — SIGNIFICANT CHANGE UP (ref 3.5–5.3)
POTASSIUM SERPL-MCNC: SIGNIFICANT CHANGE UP (ref 3.5–5.3)
POTASSIUM SERPL-SCNC: 4.2 MMOL/L — SIGNIFICANT CHANGE UP (ref 3.5–5.3)
POTASSIUM SERPL-SCNC: SIGNIFICANT CHANGE UP (ref 3.5–5.3)
RH IG SCN BLD-IMP: POSITIVE — SIGNIFICANT CHANGE UP
SODIUM SERPL-SCNC: 133 MMOL/L — LOW (ref 135–145)
SODIUM SERPL-SCNC: 137 MMOL/L — SIGNIFICANT CHANGE UP (ref 135–145)

## 2025-03-12 PROCEDURE — 85027 COMPLETE CBC AUTOMATED: CPT

## 2025-03-12 PROCEDURE — 96374 THER/PROPH/DIAG INJ IV PUSH: CPT

## 2025-03-12 PROCEDURE — 96375 TX/PRO/DX INJ NEW DRUG ADDON: CPT

## 2025-03-12 PROCEDURE — 80048 BASIC METABOLIC PNL TOTAL CA: CPT

## 2025-03-12 PROCEDURE — 76830 TRANSVAGINAL US NON-OB: CPT | Mod: 26

## 2025-03-12 PROCEDURE — 99284 EMERGENCY DEPT VISIT MOD MDM: CPT | Mod: 25

## 2025-03-12 PROCEDURE — 86900 BLOOD TYPING SEROLOGIC ABO: CPT

## 2025-03-12 PROCEDURE — 86850 RBC ANTIBODY SCREEN: CPT

## 2025-03-12 PROCEDURE — 76856 US EXAM PELVIC COMPLETE: CPT

## 2025-03-12 PROCEDURE — 84702 CHORIONIC GONADOTROPIN TEST: CPT

## 2025-03-12 PROCEDURE — 86901 BLOOD TYPING SEROLOGIC RH(D): CPT

## 2025-03-12 PROCEDURE — 76830 TRANSVAGINAL US NON-OB: CPT

## 2025-03-12 PROCEDURE — 76856 US EXAM PELVIC COMPLETE: CPT | Mod: 26

## 2025-03-12 PROCEDURE — 36415 COLL VENOUS BLD VENIPUNCTURE: CPT

## 2025-03-12 RX ORDER — ACETAMINOPHEN 500 MG/5ML
1000 LIQUID (ML) ORAL ONCE
Refills: 0 | Status: COMPLETED | OUTPATIENT
Start: 2025-03-12 | End: 2025-03-12

## 2025-03-12 RX ORDER — KETOROLAC TROMETHAMINE 30 MG/ML
15 INJECTION, SOLUTION INTRAMUSCULAR; INTRAVENOUS ONCE
Refills: 0 | Status: DISCONTINUED | OUTPATIENT
Start: 2025-03-12 | End: 2025-03-12

## 2025-03-12 RX ADMIN — Medication 400 MILLIGRAM(S): at 01:19

## 2025-03-12 RX ADMIN — KETOROLAC TROMETHAMINE 15 MILLIGRAM(S): 30 INJECTION, SOLUTION INTRAMUSCULAR; INTRAVENOUS at 01:19

## 2025-05-05 VITALS
TEMPERATURE: 97 F | WEIGHT: 178.35 LBS | DIASTOLIC BLOOD PRESSURE: 64 MMHG | HEIGHT: 67 IN | SYSTOLIC BLOOD PRESSURE: 113 MMHG | HEART RATE: 83 BPM | OXYGEN SATURATION: 100 % | RESPIRATION RATE: 18 BRPM

## 2025-05-05 NOTE — ASU PREOP CHECKLIST - AICD PRESENT
no
I reviewed the documentation initiated by the scribe, and made modifications as appropriate.  The note above represents my evaluation, exam, and medical decision making.  Maurizio Marks MD

## 2025-05-05 NOTE — ASU PATIENT PROFILE, ADULT - NSICDXPASTMEDICALHX_GEN_ALL_CORE_FT
PAST MEDICAL HISTORY:  Anemia     Arthritis     Menorrhagia     Mitral valve regurgitation     Pulmonary embolism      PAST MEDICAL HISTORY:  Anemia     Arthritis     Fibroids     History of epilepsy as a child    Menorrhagia     Mitral valve regurgitation     Pulmonary embolism

## 2025-05-05 NOTE — ASU PATIENT PROFILE, ADULT - NSICDXPASTSURGICALHX_GEN_ALL_CORE_FT
PAST SURGICAL HISTORY:  H/O myomectomy     H/O toe surgery      PAST SURGICAL HISTORY:  H/O myomectomy multiple surgeries    H/O toe surgery

## 2025-05-05 NOTE — ASU PATIENT PROFILE, ADULT - HOW PATIENT ADDRESSED, PROFILE
NEPHROLOGY: Progress   46-year-old a.m. with history of poorly controlled type 2 diabetes, obesity, creatinine of 1.47 in November of 2022 with a GFR at that time of approximately 60 however , it was also as low as 33 at that time.  He was being followed by Dr. Alonso in [facility].  He has a history of poorly controlled hypertension as well, seizure disorder and alcoholic liver disease with chronic pancreatitis.  Over the past several months the patient has had increasing volume retention, worsening anasarca and increased immobility due to the edema.  He has reportedly gained 65 lb over the past several months.    Patient has required aggressive diuresis but has lost a significant amount of weight.  He had high-grade proteinuria in excess of 10 g and biopsy has returned with advanced diabetic nodular sclerosis.      Patient underwent 5th right metatarsophalangeal joint resection for suspected osteomyelitis. He remains grossly overloaded despite aggressive diuresis. Currently seems to be tolerating Torsemide in the sense he is making excellent urine. He is lethargic today and refusing BiPAP during sleep. Weight increased this morning.       Current Facility-Administered Medications:     0.9%  NaCl infusion (for blood administration), , Intravenous, Q24H PRN, Michelle Camacho MD    acetaminophen tablet 1,000 mg, 1,000 mg, Oral, Q6H PRN, Emre Vazquez DPM, 1,000 mg at 03/27/23 2051    acetaminophen tablet 650 mg, 650 mg, Oral, Q4H PRN, Emre Vazquez DPM    albuterol nebulizer solution 2.5 mg, 2.5 mg, Nebulization, Q4H PRN, GITA Andrews    albuterol sulfate nebulizer solution 2.5 mg, 2.5 mg, Nebulization, Q4H WAKE, Marcos Saldana MD, 2.5 mg at 03/31/23 0859    aluminum-magnesium hydroxide-simethicone 200-200-20 mg/5 mL suspension 30 mL, 30 mL, Oral, QID PRN, Emre Vazquez,  DPM    ceFEPIme (MAXIPIME) 1 g in dextrose 5 % in water (D5W) 5 % 50 mL IVPB (MB+), 1 g, Intravenous, Q12H, Marcos Saldana MD, Stopped at 03/30/23 2125    cetirizine tablet 10 mg, 10 mg, Oral, Daily, Dieudonne Montemayor MD, 10 mg at 03/31/23 1003    cloNIDine tablet 0.2 mg, 0.2 mg, Oral, TID PRN, Emre Abdallaiesk, DPM    dextrose 10% bolus 125 mL 125 mL, 12.5 g, Intravenous, PRN, Emre SCOTTIE Sobiesk, DPM    dextrose 10% bolus 250 mL 250 mL, 25 g, Intravenous, PRN, Emre RUBIN Sobiesk, DPM    dextrose 40 % gel 15,000 mg, 15 g, Oral, PRN, Emre RUBIN Sobiesk, DPM    dextrose 40 % gel 30,000 mg, 30 g, Oral, PRN, Emre Abdallaiesk, DPM    doxazosin tablet 2 mg, 2 mg, Oral, QHS, Emre Vazquez, DPM, 2 mg at 03/30/23 2054    enoxaparin injection 40 mg, 40 mg, Subcutaneous, Q12H, Emre Vazquez, DPM, 40 mg at 03/31/23 1003    ergocalciferol capsule 50,000 Units, 50,000 Units, Oral, Q3 Days, Emre Abdallaiesk, DPM, 50,000 Units at 03/30/23 0855    gabapentin capsule 300 mg, 300 mg, Oral, TID, Benjamín Sharp MD, 300 mg at 03/31/23 1003    glucagon (human recombinant) injection 1 mg, 1 mg, Intramuscular, PRN, Emre Vazquez, DPM, 1 mg at 03/20/23 0545    hydrALAZINE injection 10 mg, 10 mg, Intravenous, Q4H PRN, Emre Vazquez, DPM, 10 mg at 03/12/23 1224    HYDROcodone-acetaminophen 5-325 mg per tablet 1 tablet, 1 tablet, Oral, Q4H PRN, Emre Vazquez, DPM, 1 tablet at 03/31/23 1124    insulin aspart U-100 injection 1-10 Units, 1-10 Units, Subcutaneous, QID (AC + HS) PRN, Emre Vazquez DPM, 10 Units at 03/25/23 0955    insulin aspart U-100 injection 14 Units, 14 Units, Subcutaneous, TIDWM, Emre Vazquez DPM, 14 Units at 03/30/23 1646    insulin detemir U-100 injection 20 Units, 20 Units, Subcutaneous, QHS, Michelle Camacho MD, 20 Units at 03/30/23 2053    insulin detemir U-100 injection 5 Units, 5 Units, Subcutaneous, Daily, Michelle Camacho MD, 5 Units at 03/30/23 0853    ipratropium 0.02 % nebulizer solution 0.5 mg, 0.5  mg, Nebulization, Q6H, Geena Nettles, ANP, 0.5 mg at 03/31/23 0859    labetaloL injection 10 mg, 10 mg, Intravenous, Q4H PRN, Emre Vazquez DPM    levETIRAcetam tablet 500 mg, 500 mg, Oral, BID, Benjamín Sharp MD, 500 mg at 03/31/23 1003    linezolid 600 mg/300 mL IVPB 600 mg, 600 mg, Intravenous, Q12H, Nancy Valladares MD, Last Rate: 300 mL/hr at 03/31/23 1003, 600 mg at 03/31/23 1003    lipase-protease-amylase 12,000-38,000-60,000 units per capsule 6 capsule, 6 capsule, Oral, TID, Emre Vazquez DPM, 6 capsule at 03/31/23 1004    magnesium oxide tablet 400 mg, 400 mg, Oral, BID, Emre Vazquez DPM, 400 mg at 03/31/23 1003    melatonin tablet 6 mg, 6 mg, Oral, Nightly PRN, Emre Vazquez DPM, 6 mg at 03/12/23 0012    metoprolol tartrate (LOPRESSOR) tablet 25 mg, 25 mg, Oral, BID, Emre Vazquez DPM, 25 mg at 03/31/23 1003    miconazole NITRATE 2 % top powder, , Topical (Top), BID, Emre Vazquez DPM, Given at 03/30/23 2054    NIFEdipine 24 hr tablet 90 mg, 90 mg, Oral, Daily, Emre Vazquez DPM, 90 mg at 03/31/23 1004    ondansetron injection 4 mg, 4 mg, Intravenous, Q4H PRN, Emre Vazquez DPM, 4 mg at 03/12/23 0011    oxyCODONE-acetaminophen 5-325 mg per tablet 1 tablet, 1 tablet, Oral, Q6H PRN, Emre Vazquez DPM, 1 tablet at 03/31/23 0314    polyethylene glycol packet 17 g, 17 g, Oral, Daily, Dieudonne Montemayor MD    prochlorperazine injection Soln 5 mg, 5 mg, Intravenous, Q6H PRN, Emre Vazquez DPM    senna-docusate 8.6-50 mg per tablet 2 tablet, 2 tablet, Oral, BID PRN, Emre Vazquez DPM    simethicone chewable tablet 80 mg, 1 tablet, Oral, QID PRN, Emre Vazquez DPM    sodium bicarbonate tablet 1,300 mg, 1,300 mg, Oral, TID, Emre Vazquez DPM, 1,300 mg at 03/31/23 1003    sodium chloride 0.9% flush 10 mL, 10 mL, Intravenous, PRN, Emre Vazquez DPM    Flushing PICC Protocol, , , Until Discontinued **AND** sodium chloride 0.9% flush 10 mL, 10 mL, Intravenous,  "Q6H, 10 mL at 03/31/23 0638 **AND** sodium chloride 0.9% flush 10 mL, 10 mL, Intravenous, PRN, Emre Vazquez DPM, 10 mL at 03/29/23 0526    torsemide tablet 40 mg, 40 mg, Oral, Daily, Benjamín Sharp MD, 40 mg at 03/31/23 1003    vitamin renal formula (B-complex-vitamin c-folic acid) 1 mg per capsule 1 capsule, 1 capsule, Oral, Daily, Emre Vazquez DPM, 1 capsule at 03/31/23 1003    zinc oxide-cod liver oil 40 % paste, , Topical (Top), BID, Emre Vazquez DPM, Given at 03/31/23 1006      BP (!) 183/94   Pulse 87   Temp 98.1 °F (36.7 °C) (Oral)   Resp 14   Ht 5' 5" (1.651 m)   Wt 108 kg (238 lb 1.6 oz)   SpO2 100%   BMI 39.62 kg/m²     Physical Exam:    GEN:  Obese and chronically ill-appearing black male.  HEENT: Atraumatic. EOMI, no icterus  NECK : No JVD  CARD : RRR s rub or gallop  LUNGS :  Decreased breath sounds, oxymask at 5L  ABD : Soft,non-tender. BS active, obese.  EXT : pitting edema to entirety of BLE        Intake/Output Summary (Last 24 hours) at 3/31/2023 1152  Last data filed at 3/31/2023 0601  Gross per 24 hour   Intake 840 ml   Output 3050 ml   Net -2210 ml           Laboratory:  Recent Results (from the past 24 hour(s))   POCT glucose    Collection Time: 03/30/23  3:57 PM   Result Value Ref Range    POCT Glucose 276 (H) 70 - 110 mg/dL   POCT glucose    Collection Time: 03/30/23  8:27 PM   Result Value Ref Range    POCT Glucose 259 (H) 70 - 110 mg/dL   Comprehensive Metabolic Panel    Collection Time: 03/31/23  9:02 AM   Result Value Ref Range    Sodium Level 138 136 - 145 mmol/L    Potassium Level 4.6 3.5 - 5.1 mmol/L    Chloride 103 98 - 107 mmol/L    Carbon Dioxide 28 22 - 29 mmol/L    Glucose Level 241 (H) 74 - 100 mg/dL    Blood Urea Nitrogen 48.9 (H) 8.9 - 20.6 mg/dL    Creatinine 1.70 (H) 0.73 - 1.18 mg/dL    Calcium Level Total 8.2 (L) 8.4 - 10.2 mg/dL    Protein Total 5.5 (L) 6.4 - 8.3 gm/dL    Albumin Level 2.1 (L) 3.5 - 5.0 g/dL    Globulin 3.4 2.4 - 3.5 gm/dL    " Albumin/Globulin Ratio 0.6 (L) 1.1 - 2.0 ratio    Bilirubin Total 0.3 <=1.5 mg/dL    Alkaline Phosphatase 74 40 - 150 unit/L    Alanine Aminotransferase 27 0 - 55 unit/L    Aspartate Aminotransferase 51 (H) 5 - 34 unit/L    eGFR 50 mls/min/1.73/m2   CBC with Differential    Collection Time: 03/31/23  9:02 AM   Result Value Ref Range    WBC 14.1 (H) 4.5 - 11.5 x10(3)/mcL    RBC 2.58 (L) 4.70 - 6.10 x10(6)/mcL    Hgb 7.8 (L) 14.0 - 18.0 g/dL    Hct 24.0 (L) 42.0 - 52.0 %    MCV 93.0 80.0 - 94.0 fL    MCH 30.2 27.0 - 31.0 pg    MCHC 32.5 (L) 33.0 - 36.0 g/dL    RDW 12.5 11.5 - 17.0 %    Platelet 352 130 - 400 x10(3)/mcL    MPV 11.1 (H) 7.4 - 10.4 fL    Neut % 67.7 %    Lymph % 14.7 %    Mono % 8.4 %    Eos % 8.1 %    Basophil % 0.7 %    Lymph # 2.08 0.6 - 4.6 x10(3)/mcL    Neut # 9.55 (H) 2.1 - 9.2 x10(3)/mcL    Mono # 1.18 0.1 - 1.3 x10(3)/mcL    Eos # 1.15 (H) 0 - 0.9 x10(3)/mcL    Baso # 0.10 0 - 0.2 x10(3)/mcL    IG# 0.06 (H) 0 - 0.04 x10(3)/mcL    IG% 0.4 %    NRBC% 0.0 %   Magnesium    Collection Time: 03/31/23  9:02 AM   Result Value Ref Range    Magnesium Level 1.60 1.60 - 2.60 mg/dL   Phosphorus    Collection Time: 03/31/23  9:02 AM   Result Value Ref Range    Phosphorus Level 3.7 2.3 - 4.7 mg/dL   POCT glucose    Collection Time: 03/31/23 10:50 AM   Result Value Ref Range    POCT Glucose 282 (H) 70 - 110 mg/dL         Assessment/Plan:  Advanced stage III CKD-Biopsy-Advanced Diabetic Nodular sclerosis   Hypertension-much better controlled  Rsneeave-mtiuamyo-yeycvhvsb well torsemide 40 b.i.d.   Hepatic cirrhosis   UTI   Peripheral arterial disease-suspected right 5th MPJ osteo  Profound vitamin-D deficiency-92137 units ergo q 72  Secondary hyperparathyroidism    Continue Torsemide daily.   No change from renal standpoint. OK to send to [facility] from renal standpoint.    [facility]

## 2025-05-06 ENCOUNTER — TRANSCRIPTION ENCOUNTER (OUTPATIENT)
Age: 50
End: 2025-05-06

## 2025-05-06 ENCOUNTER — INPATIENT (INPATIENT)
Facility: HOSPITAL | Age: 50
LOS: 0 days | Discharge: HOME | End: 2025-05-07
Attending: OBSTETRICS & GYNECOLOGY | Admitting: OBSTETRICS & GYNECOLOGY
Payer: COMMERCIAL

## 2025-05-06 DIAGNOSIS — Z98.890 OTHER SPECIFIED POSTPROCEDURAL STATES: Chronic | ICD-10-CM

## 2025-05-06 LAB
BLD GP AB SCN SERPL QL: NEGATIVE — SIGNIFICANT CHANGE UP
RH IG SCN BLD-IMP: POSITIVE — SIGNIFICANT CHANGE UP

## 2025-05-06 PROCEDURE — 88304 TISSUE EXAM BY PATHOLOGIST: CPT | Mod: 26

## 2025-05-06 PROCEDURE — 88307 TISSUE EXAM BY PATHOLOGIST: CPT | Mod: 26

## 2025-05-06 DEVICE — SURGICEL POWDER 3 GRAMS
Type: IMPLANTABLE DEVICE | Site: BILATERAL | Status: NON-FUNCTIONAL
Removed: 2025-05-06

## 2025-05-06 DEVICE — ARISTA 1GR
Type: IMPLANTABLE DEVICE | Site: BILATERAL | Status: NON-FUNCTIONAL
Removed: 2025-05-06

## 2025-05-06 RX ORDER — KETOROLAC TROMETHAMINE 30 MG/ML
30 INJECTION, SOLUTION INTRAMUSCULAR; INTRAVENOUS EVERY 6 HOURS
Refills: 0 | Status: DISCONTINUED | OUTPATIENT
Start: 2025-05-06 | End: 2025-05-07

## 2025-05-06 RX ORDER — KETOROLAC TROMETHAMINE 30 MG/ML
30 INJECTION, SOLUTION INTRAMUSCULAR; INTRAVENOUS EVERY 8 HOURS
Refills: 0 | Status: DISCONTINUED | OUTPATIENT
Start: 2025-05-06 | End: 2025-05-06

## 2025-05-06 RX ORDER — MELATONIN 5 MG
3 TABLET ORAL AT BEDTIME
Refills: 0 | Status: DISCONTINUED | OUTPATIENT
Start: 2025-05-06 | End: 2025-05-07

## 2025-05-06 RX ORDER — IBUPROFEN 200 MG
1 TABLET ORAL
Refills: 0 | DISCHARGE

## 2025-05-06 RX ORDER — METOCLOPRAMIDE HCL 10 MG
10 TABLET ORAL EVERY 6 HOURS
Refills: 0 | Status: DISCONTINUED | OUTPATIENT
Start: 2025-05-06 | End: 2025-05-07

## 2025-05-06 RX ORDER — HYDROMORPHONE/SOD CHLOR,ISO/PF 2 MG/10 ML
0.5 SYRINGE (ML) INJECTION
Refills: 0 | Status: DISCONTINUED | OUTPATIENT
Start: 2025-05-06 | End: 2025-05-06

## 2025-05-06 RX ORDER — SODIUM CHLORIDE 9 G/1000ML
1000 INJECTION, SOLUTION INTRAVENOUS
Refills: 0 | Status: DISCONTINUED | OUTPATIENT
Start: 2025-05-06 | End: 2025-05-06

## 2025-05-06 RX ORDER — ONDANSETRON HCL/PF 4 MG/2 ML
8 VIAL (ML) INJECTION EVERY 6 HOURS
Refills: 0 | Status: DISCONTINUED | OUTPATIENT
Start: 2025-05-06 | End: 2025-05-07

## 2025-05-06 RX ORDER — TIRZEPATIDE 7.5 MG/.5ML
15 INJECTION, SOLUTION SUBCUTANEOUS
Refills: 0 | DISCHARGE

## 2025-05-06 RX ORDER — CELECOXIB 50 MG/1
400 CAPSULE ORAL ONCE
Refills: 0 | Status: COMPLETED | OUTPATIENT
Start: 2025-05-06 | End: 2025-05-06

## 2025-05-06 RX ORDER — HEPARIN SODIUM 1000 [USP'U]/ML
5000 INJECTION INTRAVENOUS; SUBCUTANEOUS ONCE
Refills: 0 | Status: COMPLETED | OUTPATIENT
Start: 2025-05-06 | End: 2025-05-06

## 2025-05-06 RX ORDER — OXYCODONE HYDROCHLORIDE 30 MG/1
5 TABLET ORAL EVERY 4 HOURS
Refills: 0 | Status: DISCONTINUED | OUTPATIENT
Start: 2025-05-06 | End: 2025-05-07

## 2025-05-06 RX ORDER — ACETAMINOPHEN 500 MG/5ML
1000 LIQUID (ML) ORAL EVERY 6 HOURS
Refills: 0 | Status: DISCONTINUED | OUTPATIENT
Start: 2025-05-06 | End: 2025-05-07

## 2025-05-06 RX ORDER — ACETAMINOPHEN 500 MG/5ML
1000 LIQUID (ML) ORAL ONCE
Refills: 0 | Status: COMPLETED | OUTPATIENT
Start: 2025-05-06 | End: 2025-05-06

## 2025-05-06 RX ORDER — ACETAMINOPHEN 500 MG/5ML
2 LIQUID (ML) ORAL
Qty: 0 | Refills: 0 | DISCHARGE
Start: 2025-05-06

## 2025-05-06 RX ORDER — SIMETHICONE 80 MG
80 TABLET,CHEWABLE ORAL EVERY 6 HOURS
Refills: 0 | Status: DISCONTINUED | OUTPATIENT
Start: 2025-05-06 | End: 2025-05-07

## 2025-05-06 RX ORDER — BUPIVACAINE 13.3 MG/ML
20 INJECTION, SUSPENSION, LIPOSOMAL INFILTRATION ONCE
Refills: 0 | Status: DISCONTINUED | OUTPATIENT
Start: 2025-05-06 | End: 2025-05-07

## 2025-05-06 RX ORDER — OXYCODONE HYDROCHLORIDE 30 MG/1
10 TABLET ORAL EVERY 4 HOURS
Refills: 0 | Status: DISCONTINUED | OUTPATIENT
Start: 2025-05-06 | End: 2025-05-07

## 2025-05-06 RX ADMIN — Medication 80 MILLIGRAM(S): at 12:42

## 2025-05-06 RX ADMIN — Medication 40 MILLIGRAM(S): at 12:42

## 2025-05-06 RX ADMIN — KETOROLAC TROMETHAMINE 30 MILLIGRAM(S): 30 INJECTION, SOLUTION INTRAMUSCULAR; INTRAVENOUS at 23:37

## 2025-05-06 RX ADMIN — Medication 1 LOZENGE: at 18:47

## 2025-05-06 RX ADMIN — OXYCODONE HYDROCHLORIDE 10 MILLIGRAM(S): 30 TABLET ORAL at 21:20

## 2025-05-06 RX ADMIN — KETOROLAC TROMETHAMINE 30 MILLIGRAM(S): 30 INJECTION, SOLUTION INTRAMUSCULAR; INTRAVENOUS at 23:11

## 2025-05-06 RX ADMIN — Medication 1000 MILLIGRAM(S): at 08:17

## 2025-05-06 RX ADMIN — Medication 0.5 MILLIGRAM(S): at 11:38

## 2025-05-06 RX ADMIN — Medication 1000 MILLIGRAM(S): at 23:37

## 2025-05-06 RX ADMIN — Medication 3 MILLIGRAM(S): at 21:20

## 2025-05-06 RX ADMIN — HEPARIN SODIUM 5000 UNIT(S): 1000 INJECTION INTRAVENOUS; SUBCUTANEOUS at 08:17

## 2025-05-06 RX ADMIN — KETOROLAC TROMETHAMINE 30 MILLIGRAM(S): 30 INJECTION, SOLUTION INTRAMUSCULAR; INTRAVENOUS at 17:46

## 2025-05-06 RX ADMIN — Medication 1000 MILLIGRAM(S): at 23:12

## 2025-05-06 RX ADMIN — OXYCODONE HYDROCHLORIDE 10 MILLIGRAM(S): 30 TABLET ORAL at 22:19

## 2025-05-06 RX ADMIN — CELECOXIB 400 MILLIGRAM(S): 50 CAPSULE ORAL at 08:17

## 2025-05-06 RX ADMIN — Medication 1000 MILLIGRAM(S): at 17:44

## 2025-05-06 RX ADMIN — Medication 0.5 MILLIGRAM(S): at 12:23

## 2025-05-06 RX ADMIN — Medication 80 MILLIGRAM(S): at 17:45

## 2025-05-06 RX ADMIN — Medication 80 MILLIGRAM(S): at 23:12

## 2025-05-06 NOTE — PRE-ANESTHESIA EVALUATION ADULT - NSANTHPMHFT_GEN_ALL_CORE
Aidan Reniewed  Seizures once during childhood,no meds  Pre Dm on mounjaro,stopped 1 week  PE 2019.No cause found on Eliquis for 1 year

## 2025-05-06 NOTE — ASU DISCHARGE PLAN (ADULT/PEDIATRIC) - CARE PROVIDER_API CALL
Marlee Ruiz  Obstetrics and Gynecology  [address], [address]  [place] [address]  Phone: [phone]  Fax: [phone]  Follow Up Time:

## 2025-05-06 NOTE — H&P ADULT - HISTORY OF PRESENT ILLNESS
51 yo presenting for L/S FRIDA BS for AUB and myomas. Feeling well today without complaints.     OB Hx:  x 3  GYN Hx: myomas  Med Hx: PE b/l provoked on OCPs 2019  Surg Hx: HSC myomectomy x 3, [place]  Meds: mounjaro  All: ESEQUIEL CASTRO

## 2025-05-06 NOTE — DISCHARGE NOTE PROVIDER - HOSPITAL COURSE
Patient underwent an uncomplicated l/s FRIDA, BS for 6 week sized uterus for menorrhagia and fibroids. Patient’s postoperative course was unremarkable and she remained hemodynamically stable and afebrile throughout. Upon discharge on POD#1, the patient is ambulating and voiding spontaneously, tolerating oral intake, pain was well controlled with oral medication, and vital signs were stable.     Patient underwent an uncomplicated l/s FRIDA, BS for 6 week sized uterus for menorrhagia and fibroids. Patient’s postoperative course was unremarkable and she remained hemodynamically stable and afebrile throughout. Upon discharge on POD#1, the patient is ambulating and voiding spontaneously, tolerating oral intake, pain was well controlled with oral medication, and vital signs were stable. Due to her remote history of PE provoked on OCP pt continued on eliquis 5mg BID for 30 days postop.

## 2025-05-06 NOTE — DISCHARGE NOTE PROVIDER - NSDCMRMEDTOKEN_GEN_ALL_CORE_FT
acetaminophen 500 mg oral tablet: 2 tab(s) orally every 6 hours  Motrin 800 mg oral tablet: 1 tab(s) orally once a day  Mounjaro 15 mg/0.5 mL subcutaneous solution: 15 milligram(s) subcutaneously   acetaminophen 500 mg oral tablet: 2 tab(s) orally every 6 hours  Eliquis 5 mg oral tablet: 1 tab(s) orally 2 times a day  Motrin 800 mg oral tablet: 1 tab(s) orally once a day  Mounjaro 15 mg/0.5 mL subcutaneous solution: 15 milligram(s) subcutaneously

## 2025-05-06 NOTE — DISCHARGE NOTE PROVIDER - NSDCFUADDINST_GEN_ALL_CORE_FT
- Nothing in vagina - no intercourse, tampons, or douching until cleared by your doctor.   - Avoid swimming, tub baths, and heavy lifting until cleared by your doctor.   - Showering is ok.   - Continue oral pain medications as needed for pain. Can take tylenol 1000mg every 6 hours as needed in addition to ibuprofen 600 mg every 6 hours as needed.   - Follow up in office in 1-2 weeks for your postoperative visit.    - Call the office sooner if you develop any fever, heavy bleeding, or severe pain.  Go to the closest emergency room for any of these symptoms if you are not able to contact your doctor.  - Continue taking Eliquis 5mg twice per day for 30 days after the surgery to prevent blood clots   - Nothing in vagina - no intercourse, tampons, or douching until cleared by your doctor.   - Avoid swimming, tub baths, and heavy lifting until cleared by your doctor.   - Showering is ok.   - Continue oral pain medications as needed for pain. Can take tylenol 1000mg every 6 hours as needed in addition to ibuprofen 600 mg every 6 hours as needed.   - Follow up in office in 1-2 weeks for your postoperative visit.    - Call the office sooner if you develop any fever, heavy bleeding, or severe pain.  Go to the closest emergency room for any of these symptoms if you are not able to contact your doctor.

## 2025-05-06 NOTE — PRE-ANESTHESIA EVALUATION ADULT - BP NONINVASIVE DIASTOLIC (MM HG)
DERMATOLOGY VISIT NOTE        Verbal permission granted by patient to leave a detailed message with medical information on answering machine at phone number listed in Epic? yes    If female, are you pregnant, trying to become pregnant, or breastfeeding? No    COVID-19 Screening:  Does the patient OR patient’s household members have any of the following symptoms?  • Temperature: Fever >100.4°F or >38.0°C?  No  • Respiratory symptoms: New or worsening cough, shortness of breath, or sore throat? No  • GI symptoms: New onset of nausea, vomiting or diarrhea?  No  • Miscellaneous: New onset of loss of taste or smell, chills, repeated shaking with chills, muscle pain or headache?  No  Has the patient or a household member tested positive for COVID-19 in the last 14 days?  No                 64

## 2025-05-06 NOTE — DISCHARGE NOTE PROVIDER - NSDCFUSCHEDAPPT_GEN_ALL_CORE_FT
Ghislaine Morris  [facility]  [address]  Scheduled Appointment: 06/03/2025

## 2025-05-06 NOTE — BRIEF OPERATIVE NOTE - OPERATION/FINDINGS
Small umbilical incision made and open technique used to enter abdominal cavity with gel point. Abdomen insufflated to 15mmHg. R and L lateral 12mm ports placed under direct visualization. Intraabdominal survey wnl, uterus wnl, b/l ovaries and fallopian tubes wnl. Supracervical hysterectomy performed with harmonic and enseal devices and morcellated through the umbilical incision. Excellent hemostasis. Abd desulfated under direct visualization. Fascia and skin closed at all port sites.  Umbilical incision made and open technique used to enter abdominal cavity with gel point. Abdomen insufflated to 15mmHg. R and L lateral 12mm ports placed under direct visualization. Intraabdominal survey with bowel adhesion to anterior right abdominal wall, uterus wnl, b/l ovaries and fallopian tubes wnl. Adhesion taken down. Supracervical hysterectomy performed with harmonic and enseal devices and morcellated through the umbilical incision. Excellent hemostasis. Abd desulfated under direct visualization. Fascia and skin closed at all port sites.

## 2025-05-06 NOTE — DISCHARGE NOTE PROVIDER - CARE PROVIDERS DIRECT ADDRESSES
[email or web address]

## 2025-05-06 NOTE — BRIEF OPERATIVE NOTE - NSICDXBRIEFPROCEDURE_GEN_ALL_CORE_FT
PROCEDURES:  Laparoscopic supracervical hysterectomy with cystoscopy 06-May-2025 08:14:04  Liliam Tobin

## 2025-05-06 NOTE — ASU DISCHARGE PLAN (ADULT/PEDIATRIC) - FINANCIAL ASSISTANCE
[facility] provides services at a reduced cost to those who are determined to be eligible through [facility]’s financial assistance program. Information regarding [facility]’s financial assistance program can be found by going to [email or web address] or by calling [phone].

## 2025-05-06 NOTE — DISCHARGE NOTE PROVIDER - NSDCCPTREATMENT_GEN_ALL_CORE_FT
PRINCIPAL PROCEDURE  Procedure: Laparoscopic supracervical hysterectomy with cystoscopy  Findings and Treatment:       SECONDARY PROCEDURE  Procedure: Laparoscopic supracervical hysterectomy with cystoscopy  Findings and Treatment:

## 2025-05-06 NOTE — DISCHARGE NOTE PROVIDER - CARE PROVIDER_API CALL
Marlee Ruiz  Obstetrics and Gynecology  [address], [address]  [place] [address]  Phone: [phone]  Fax: [phone]  Established Patient  Follow Up Time:

## 2025-05-06 NOTE — H&P ADULT - NSICDXPASTMEDICALHX_GEN_ALL_CORE_FT
PAST MEDICAL HISTORY:  Anemia     Arthritis     Fibroids     History of epilepsy as a child    Menorrhagia     Mitral valve regurgitation     Pulmonary embolism

## 2025-05-06 NOTE — DISCHARGE NOTE PROVIDER - NSDCCPCAREPLAN_GEN_ALL_CORE_FT
PRINCIPAL DISCHARGE DIAGNOSIS  Diagnosis: Uterine fibroid  Assessment and Plan of Treatment:       SECONDARY DISCHARGE DIAGNOSES  Diagnosis: Menorrhagia  Assessment and Plan of Treatment:

## 2025-05-06 NOTE — H&P ADULT - ASSESSMENT
51 yo presenting for LS FRIDA BS  - Recommended by medical doctor for inpatient admission following surgery for monitoring due to hx of PE and initiation of AC post op day 1

## 2025-05-06 NOTE — CHART NOTE - NSCHARTNOTEFT_GEN_A_CORE
GYN POC  51 yo P3 s/p LS FRIDA BS 6wk sized uterus for HMB and myomas.   Pt seen and examined at bedside. Pt complains of mild abdominal pain.   Pt denies any fever, chills, chest pain, SOB, nausea or vomiting.    T(F): 98.2 (05-06-25 @ 16:52), Max: 98.2 (05-06-25 @ 16:52)  HR: 72 (05-06-25 @ 16:52) (60 - 98)  BP: 102/68 (05-06-25 @ 16:52) (101/51 - 125/73)  RR: 18 (05-06-25 @ 16:52) (16 - 18)  SpO2: 98% (05-06-25 @ 16:52) (94% - 100%)  Wt(kg): --    05-06 @ 07:01  -  05-06 @ 18:27  --------------------------------------------------------  IN: 200 mL / OUT: 0 mL / NET: 200 mL        acetaminophen     Tablet .. 1000 milliGRAM(s) Oral every 6 hours  benzocaine/menthol Lozenge 2 Lozenge Oral once  BUpivacaine liposome 1.3% Injectable (no eMAR) 20 milliLiter(s) Local Injection once  ketorolac   Injectable 30 milliGRAM(s) IV Push every 8 hours  metoclopramide Injectable 10 milliGRAM(s) IV Push every 6 hours PRN Nausea and/or Vomiting  ondansetron Injectable 8 milliGRAM(s) IV Push every 6 hours PRN Nausea and/or Vomiting (1st line)  oxyCODONE    IR 5 milliGRAM(s) Oral every 4 hours PRN Moderate Pain (4 - 6)  oxyCODONE    IR 10 milliGRAM(s) Oral every 4 hours PRN Severe Pain (7 - 10)  pantoprazole  Injectable 40 milliGRAM(s) IV Push daily  simethicone 80 milliGRAM(s) Chew every 6 hours      Physical exam:  Constitutional: NAD  Pulmonary: no incr. WOB  Abdomen: incision sites clean, dry and intact, abdomen soft, mildly tender, moderately distended.  Extremities: no lower extremity edema, or calf tenderness w/ SCDs in places    A: 51 yo P3 s/p LS FRIDA BS 6wk sized uterus for HMB and myomas.     Plan: Admit to GYN  1. Vital signs stable, continue to monitor per protocol  2. Pain control  3. DVT prophylaxis: SCDs, ambulate as tolerated  4. CV: PO hydration  5. Pulm: Incentive spirometer (at least 10 times per hour while awake)   6. GI: Diet, low fiber   7. : Monahan removed, f/u TOV  8. Follow up labs: AM CBC    Kimmy Street PA-C

## 2025-05-07 ENCOUNTER — TRANSCRIPTION ENCOUNTER (OUTPATIENT)
Age: 50
End: 2025-05-07

## 2025-05-07 VITALS
HEART RATE: 70 BPM | RESPIRATION RATE: 16 BRPM | SYSTOLIC BLOOD PRESSURE: 123 MMHG | TEMPERATURE: 98 F | OXYGEN SATURATION: 99 % | DIASTOLIC BLOOD PRESSURE: 76 MMHG

## 2025-05-07 LAB
BASOPHILS # BLD AUTO: 0.01 K/UL — SIGNIFICANT CHANGE UP (ref 0–0.2)
BASOPHILS NFR BLD AUTO: 0.2 % — SIGNIFICANT CHANGE UP (ref 0–2)
EOSINOPHIL # BLD AUTO: 0.13 K/UL — SIGNIFICANT CHANGE UP (ref 0–0.5)
EOSINOPHIL NFR BLD AUTO: 2 % — SIGNIFICANT CHANGE UP (ref 0–6)
HCT VFR BLD CALC: 32.4 % — LOW (ref 34.5–45)
HGB BLD-MCNC: 10.6 G/DL — LOW (ref 11.5–15.5)
IMM GRANULOCYTES NFR BLD AUTO: 0.3 % — SIGNIFICANT CHANGE UP (ref 0–0.9)
LYMPHOCYTES # BLD AUTO: 1.43 K/UL — SIGNIFICANT CHANGE UP (ref 1–3.3)
LYMPHOCYTES # BLD AUTO: 22 % — SIGNIFICANT CHANGE UP (ref 13–44)
MCHC RBC-ENTMCNC: 32.7 G/DL — SIGNIFICANT CHANGE UP (ref 32–36)
MCHC RBC-ENTMCNC: 32.9 PG — SIGNIFICANT CHANGE UP (ref 27–34)
MCV RBC AUTO: 100.6 FL — HIGH (ref 80–100)
MONOCYTES # BLD AUTO: 0.24 K/UL — SIGNIFICANT CHANGE UP (ref 0–0.9)
MONOCYTES NFR BLD AUTO: 3.7 % — SIGNIFICANT CHANGE UP (ref 2–14)
NEUTROPHILS # BLD AUTO: 4.66 K/UL — SIGNIFICANT CHANGE UP (ref 1.8–7.4)
NEUTROPHILS NFR BLD AUTO: 71.8 % — SIGNIFICANT CHANGE UP (ref 43–77)
NRBC BLD AUTO-RTO: 0 /100 WBCS — SIGNIFICANT CHANGE UP (ref 0–0)
PLATELET # BLD AUTO: 287 K/UL — SIGNIFICANT CHANGE UP (ref 150–400)
RBC # BLD: 3.22 M/UL — LOW (ref 3.8–5.2)
RBC # FLD: 13.4 % — SIGNIFICANT CHANGE UP (ref 10.3–14.5)
WBC # BLD: 6.49 K/UL — SIGNIFICANT CHANGE UP (ref 3.8–10.5)
WBC # FLD AUTO: 6.49 K/UL — SIGNIFICANT CHANGE UP (ref 3.8–10.5)

## 2025-05-07 PROCEDURE — 84702 CHORIONIC GONADOTROPIN TEST: CPT

## 2025-05-07 PROCEDURE — C1889: CPT

## 2025-05-07 PROCEDURE — 36415 COLL VENOUS BLD VENIPUNCTURE: CPT

## 2025-05-07 PROCEDURE — 88307 TISSUE EXAM BY PATHOLOGIST: CPT

## 2025-05-07 PROCEDURE — 86901 BLOOD TYPING SEROLOGIC RH(D): CPT

## 2025-05-07 PROCEDURE — C9399: CPT

## 2025-05-07 PROCEDURE — 86850 RBC ANTIBODY SCREEN: CPT

## 2025-05-07 PROCEDURE — 86900 BLOOD TYPING SEROLOGIC ABO: CPT

## 2025-05-07 PROCEDURE — 88304 TISSUE EXAM BY PATHOLOGIST: CPT

## 2025-05-07 PROCEDURE — 85025 COMPLETE CBC W/AUTO DIFF WBC: CPT

## 2025-05-07 RX ORDER — POLYETHYLENE GLYCOL 3350 17 G/17G
17 POWDER, FOR SOLUTION ORAL EVERY 24 HOURS
Refills: 0 | Status: DISCONTINUED | OUTPATIENT
Start: 2025-05-07 | End: 2025-05-07

## 2025-05-07 RX ORDER — DIAZEPAM 2 MG/1
2 TABLET ORAL ONCE
Refills: 0 | Status: DISCONTINUED | OUTPATIENT
Start: 2025-05-07 | End: 2025-05-07

## 2025-05-07 RX ORDER — APIXABAN 2.5 MG/1
5 TABLET, FILM COATED ORAL EVERY 12 HOURS
Refills: 0 | Status: DISCONTINUED | OUTPATIENT
Start: 2025-05-07 | End: 2025-05-07

## 2025-05-07 RX ORDER — APIXABAN 2.5 MG/1
1 TABLET, FILM COATED ORAL
Qty: 60 | Refills: 0
Start: 2025-05-07 | End: 2025-06-05

## 2025-05-07 RX ADMIN — KETOROLAC TROMETHAMINE 30 MILLIGRAM(S): 30 INJECTION, SOLUTION INTRAMUSCULAR; INTRAVENOUS at 06:01

## 2025-05-07 RX ADMIN — Medication 1000 MILLIGRAM(S): at 06:01

## 2025-05-07 RX ADMIN — DIAZEPAM 2 MILLIGRAM(S): 2 TABLET ORAL at 17:08

## 2025-05-07 RX ADMIN — Medication 1000 MILLIGRAM(S): at 05:02

## 2025-05-07 RX ADMIN — Medication 80 MILLIGRAM(S): at 17:08

## 2025-05-07 RX ADMIN — KETOROLAC TROMETHAMINE 30 MILLIGRAM(S): 30 INJECTION, SOLUTION INTRAMUSCULAR; INTRAVENOUS at 11:29

## 2025-05-07 RX ADMIN — Medication 80 MILLIGRAM(S): at 11:29

## 2025-05-07 RX ADMIN — Medication 80 MILLIGRAM(S): at 05:02

## 2025-05-07 RX ADMIN — OXYCODONE HYDROCHLORIDE 10 MILLIGRAM(S): 30 TABLET ORAL at 10:38

## 2025-05-07 RX ADMIN — Medication 1000 MILLIGRAM(S): at 16:10

## 2025-05-07 RX ADMIN — Medication 1000 MILLIGRAM(S): at 11:30

## 2025-05-07 RX ADMIN — KETOROLAC TROMETHAMINE 30 MILLIGRAM(S): 30 INJECTION, SOLUTION INTRAMUSCULAR; INTRAVENOUS at 05:02

## 2025-05-07 RX ADMIN — KETOROLAC TROMETHAMINE 30 MILLIGRAM(S): 30 INJECTION, SOLUTION INTRAMUSCULAR; INTRAVENOUS at 17:08

## 2025-05-07 RX ADMIN — APIXABAN 5 MILLIGRAM(S): 2.5 TABLET, FILM COATED ORAL at 11:29

## 2025-05-07 RX ADMIN — OXYCODONE HYDROCHLORIDE 10 MILLIGRAM(S): 30 TABLET ORAL at 11:10

## 2025-05-07 RX ADMIN — Medication 40 MILLIGRAM(S): at 11:29

## 2025-05-07 NOTE — DISCHARGE NOTE NURSING/CASE MANAGEMENT/SOCIAL WORK - FINANCIAL ASSISTANCE
[facility] provides services at a reduced cost to those who are determined to be eligible through [facility]’s financial assistance program. Information regarding [facility]’s financial assistance program can be found by going to [email or web address] or by calling [phone].

## 2025-05-07 NOTE — DISCHARGE NOTE NURSING/CASE MANAGEMENT/SOCIAL WORK - PATIENT PORTAL LINK FT
You can access the FollowMyHealth Patient Portal offered by [facility] by registering at the following website: [email or web address]. By joining [email or web address]’s FollowMyHealth portal, you will also be able to view your health information using other applications (apps) compatible with our system.

## 2025-05-07 NOTE — PROGRESS NOTE ADULT - ASSESSMENT
49 yo P3 s/p LS FRIDA BS 6wk sized uterus for HMB and myomas.     Neuro: tylenol/toradol ATC, oxy PRN, melatonin qhs   Pulm: RA  CV: VSS; MVR  GI: LRD, heplocked, cepacol PRN, zofran/reglan PRN, simethicone ATC, protonix, miralax, -/-  : s/p ashley, voiding   GYN: s/p FRIDA BS  VTE: hx PE in 2019; SCDs    [ ] F/u AM CBC prior to starting anticoagulation

## 2025-05-07 NOTE — PROGRESS NOTE ADULT - SUBJECTIVE AND OBJECTIVE BOX
Pt seen and examined at bedside. Pt states mild abdominal pain. Pt ambulating, tolerating diet, + flatus, + BM, urinating adequately.   Pt denies fever, chills, chest pain, SOB, nausea, vomiting, lightheadedness, dizziness.      T(F): 98.3 (05-07-25 @ 05:19), Max: 98.8 (05-06-25 @ 23:53)  HR: 78 (05-07-25 @ 05:19) (60 - 98)  BP: 106/68 (05-07-25 @ 05:19) (101/51 - 125/73)  RR: 17 (05-07-25 @ 05:19) (16 - 18)  SpO2: 99% (05-07-25 @ 05:19) (94% - 100%)  Wt(kg): --  I&O's Summary    06 May 2025 07:01  -  07 May 2025 07:00  --------------------------------------------------------  IN: 200 mL / OUT: 950 mL / NET: -750 mL        MEDICATIONS  (STANDING):  acetaminophen     Tablet .. 1000 milliGRAM(s) Oral every 6 hours  BUpivacaine liposome 1.3% Injectable (no eMAR) 20 milliLiter(s) Local Injection once  ketorolac   Injectable 30 milliGRAM(s) IV Push every 6 hours  melatonin 3 milliGRAM(s) Oral at bedtime  pantoprazole  Injectable 40 milliGRAM(s) IV Push daily  polyethylene glycol 3350 17 Gram(s) Oral every 24 hours  simethicone 80 milliGRAM(s) Chew every 6 hours    MEDICATIONS  (PRN):  benzocaine/menthol Lozenge 1 Lozenge Oral every 8 hours PRN Sore Throat  metoclopramide Injectable 10 milliGRAM(s) IV Push every 6 hours PRN Nausea and/or Vomiting  ondansetron Injectable 8 milliGRAM(s) IV Push every 6 hours PRN Nausea and/or Vomiting (1st line)  oxyCODONE    IR 5 milliGRAM(s) Oral every 4 hours PRN Moderate Pain (4 - 6)  oxyCODONE    IR 10 milliGRAM(s) Oral every 4 hours PRN Severe Pain (7 - 10)      Physical Exam:  Constitutional: NAD  Pulmonary: no increased WOB  Abdomen: incision site clean, dry, intact. Soft, mildly tender, nondistended, no guarding, no rebound, normoactive bowel sounds  Extremities: no lower extremity edema or calf tenderness. SCDs in place     LABS:                RADIOLOGY & ADDITIONAL TESTS:

## 2025-05-21 DIAGNOSIS — N85.2 HYPERTROPHY OF UTERUS: ICD-10-CM

## 2025-05-21 DIAGNOSIS — D25.9 LEIOMYOMA OF UTERUS, UNSPECIFIED: ICD-10-CM

## 2025-05-21 DIAGNOSIS — Z88.0 ALLERGY STATUS TO PENICILLIN: ICD-10-CM

## 2025-05-21 DIAGNOSIS — N92.0 EXCESSIVE AND FREQUENT MENSTRUATION WITH REGULAR CYCLE: ICD-10-CM

## 2025-05-21 DIAGNOSIS — E11.9 TYPE 2 DIABETES MELLITUS WITHOUT COMPLICATIONS: ICD-10-CM

## 2025-05-21 DIAGNOSIS — G40.909 EPILEPSY, UNSPECIFIED, NOT INTRACTABLE, WITHOUT STATUS EPILEPTICUS: ICD-10-CM

## 2025-05-21 DIAGNOSIS — Z79.85 LONG-TERM (CURRENT) USE OF INJECTABLE NON-INSULIN ANTIDIABETIC DRUGS: ICD-10-CM

## 2025-05-21 DIAGNOSIS — E78.5 HYPERLIPIDEMIA, UNSPECIFIED: ICD-10-CM

## 2025-05-21 DIAGNOSIS — I34.0 NONRHEUMATIC MITRAL (VALVE) INSUFFICIENCY: ICD-10-CM

## 2025-05-21 DIAGNOSIS — Z86.711 PERSONAL HISTORY OF PULMONARY EMBOLISM: ICD-10-CM

## 2025-05-21 DIAGNOSIS — N93.9 ABNORMAL UTERINE AND VAGINAL BLEEDING, UNSPECIFIED: ICD-10-CM

## 2025-08-11 ENCOUNTER — EMERGENCY (EMERGENCY)
Facility: HOSPITAL | Age: 50
LOS: 1 days | End: 2025-08-11
Admitting: STUDENT IN AN ORGANIZED HEALTH CARE EDUCATION/TRAINING PROGRAM
Payer: COMMERCIAL

## 2025-08-11 VITALS
HEIGHT: 67 IN | TEMPERATURE: 98 F | WEIGHT: 167.99 LBS | RESPIRATION RATE: 18 BRPM | HEART RATE: 95 BPM | OXYGEN SATURATION: 99 % | DIASTOLIC BLOOD PRESSURE: 95 MMHG | SYSTOLIC BLOOD PRESSURE: 153 MMHG

## 2025-08-11 DIAGNOSIS — Z98.890 OTHER SPECIFIED POSTPROCEDURAL STATES: Chronic | ICD-10-CM

## 2025-08-11 PROBLEM — D21.9 BENIGN NEOPLASM OF CONNECTIVE AND OTHER SOFT TISSUE, UNSPECIFIED: Chronic | Status: ACTIVE | Noted: 2025-05-06

## 2025-08-11 PROBLEM — Z86.69 PERSONAL HISTORY OF OTHER DISEASES OF THE NERVOUS SYSTEM AND SENSE ORGANS: Chronic | Status: ACTIVE | Noted: 2025-05-06

## 2025-08-11 PROCEDURE — 73590 X-RAY EXAM OF LOWER LEG: CPT | Mod: 26,RT

## 2025-08-11 PROCEDURE — 73590 X-RAY EXAM OF LOWER LEG: CPT

## 2025-08-11 PROCEDURE — 73630 X-RAY EXAM OF FOOT: CPT | Mod: 26,RT

## 2025-08-11 PROCEDURE — 99284 EMERGENCY DEPT VISIT MOD MDM: CPT

## 2025-08-11 PROCEDURE — 73630 X-RAY EXAM OF FOOT: CPT

## 2025-08-11 PROCEDURE — 73564 X-RAY EXAM KNEE 4 OR MORE: CPT | Mod: 26,RT

## 2025-08-11 PROCEDURE — 73564 X-RAY EXAM KNEE 4 OR MORE: CPT

## 2025-08-11 PROCEDURE — 99284 EMERGENCY DEPT VISIT MOD MDM: CPT | Mod: 25

## 2025-08-11 RX ORDER — IBUPROFEN 200 MG
600 TABLET ORAL ONCE
Refills: 0 | Status: COMPLETED | OUTPATIENT
Start: 2025-08-11 | End: 2025-08-11

## 2025-08-11 RX ORDER — IBUPROFEN 200 MG
1 TABLET ORAL
Qty: 20 | Refills: 0
Start: 2025-08-11 | End: 2025-08-15

## 2025-08-11 RX ORDER — POLYMYXIN B SULFATE AND TRIMETHOPRIM SULFATE 10000; 1 [USP'U]/ML; MG/ML
1 SOLUTION/ DROPS OPHTHALMIC
Qty: 1 | Refills: 0
Start: 2025-08-11 | End: 2025-08-15

## 2025-08-11 RX ORDER — FLUCONAZOLE 150 MG
1 TABLET ORAL
Qty: 1 | Refills: 0
Start: 2025-08-11 | End: 2025-08-11

## 2025-08-11 RX ADMIN — Medication 600 MILLIGRAM(S): at 13:57

## 2025-08-12 ENCOUNTER — APPOINTMENT (OUTPATIENT)
Dept: PULMONOLOGY | Facility: CLINIC | Age: 50
End: 2025-08-12

## 2025-08-14 DIAGNOSIS — S80.01XA CONTUSION OF RIGHT KNEE, INITIAL ENCOUNTER: ICD-10-CM
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
## 2025-08-14 DIAGNOSIS — M25.561 PAIN IN RIGHT KNEE: ICD-10-CM

## 2025-08-14 DIAGNOSIS — Z88.0 ALLERGY STATUS TO PENICILLIN: ICD-10-CM

## 2025-08-14 DIAGNOSIS — S90.31XA CONTUSION OF RIGHT FOOT, INITIAL ENCOUNTER: ICD-10-CM
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
## 2025-08-21 ENCOUNTER — RESULT REVIEW (OUTPATIENT)
Age: 50
End: 2025-08-21

## 2025-08-21 ENCOUNTER — APPOINTMENT (OUTPATIENT)
Dept: PULMONOLOGY | Facility: CLINIC | Age: 50
End: 2025-08-21
Payer: COMMERCIAL

## 2025-08-21 VITALS
BODY MASS INDEX: 25.74 KG/M2 | RESPIRATION RATE: 20 BRPM | OXYGEN SATURATION: 100 % | HEART RATE: 98 BPM | WEIGHT: 164 LBS | TEMPERATURE: 98.3 F | DIASTOLIC BLOOD PRESSURE: 80 MMHG | SYSTOLIC BLOOD PRESSURE: 120 MMHG | HEIGHT: 67 IN

## 2025-08-21 DIAGNOSIS — R06.83 SNORING: ICD-10-CM

## 2025-08-21 DIAGNOSIS — R93.89 ABNORMAL FINDINGS ON DIAGNOSTIC IMAGING OF OTHER SPECIFIED BODY STRUCTURES: ICD-10-CM

## 2025-08-21 PROCEDURE — 99214 OFFICE O/P EST MOD 30 MIN: CPT

## 2025-08-21 PROCEDURE — G2211 COMPLEX E/M VISIT ADD ON: CPT | Mod: NC

## (undated) DEVICE — TROCAR COVIDIEN VERSAONE FIXATION CANNULA 12MM

## (undated) DEVICE — NDL HYPO SAFE 22G X 1.5" (BLACK)

## (undated) DEVICE — GOWN TRIMAX XXL

## (undated) DEVICE — WARMING BLANKET UPPER ADULT

## (undated) DEVICE — SUT VICRYL 0 27" UR-6

## (undated) DEVICE — PREP CHLOROHEXIDINE 4% 118CC KIT

## (undated) DEVICE — NDL GRASPING DISP

## (undated) DEVICE — FOLEY TRAY 16FR 5CC LF UMETER CLOSED

## (undated) DEVICE — SHEARS HARMONIC 1100 5MM X 36CM CURVED TIP

## (undated) DEVICE — TIP METZENBAUM SCISSOR MONOPOLAR ENDOCUT (ORANGE)

## (undated) DEVICE — INSUFFLATION NDL COVIDIEN SURGINEEDLE VERESS 120MM

## (undated) DEVICE — DRSG STERISTRIPS 0.25 X 4"

## (undated) DEVICE — SUT MONOCRYL 4-0 27" PS-2 UNDYED

## (undated) DEVICE — ENDOCATCH 10MM

## (undated) DEVICE — Device

## (undated) DEVICE — SEALER TISS ENSEAL CRVD X1 35CM

## (undated) DEVICE — ELECTRO LUBE ANTI-STICK SOLUTION FOR CAUTERY TIP

## (undated) DEVICE — ENDOCATCH II 15MM

## (undated) DEVICE — TUBING STRYKER PNEUMOCLEAR SMOKE EVACUATION HIGH FLOW

## (undated) DEVICE — PACK GYN WDC

## (undated) DEVICE — DRSG CURITY GAUZE SPONGE 2 X 2" 8-PLY NON-STERILE

## (undated) DEVICE — DISSECTOR ENDOSCOPIC KITTNER SINGLE TIP

## (undated) DEVICE — DRSG DERMABOND 0.7ML

## (undated) DEVICE — SOL IRR BAG NS 0.9% 3000ML

## (undated) DEVICE — PREP CHLORAPREP HI-LITE ORANGE 26ML

## (undated) DEVICE — SHEARS HARMONIC HD 1000I 5MM X 36CM CURVED TIP

## (undated) DEVICE — D HELP - CLEARVIEW CLEARIFY SYSTEM

## (undated) DEVICE — POSITIONER PINK PAD PIGAZZI SYSTEM XL W ARM PROTECTOR

## (undated) DEVICE — POSITIONER FOAM EGG CRATE ULNAR 2PCS (PINK)

## (undated) DEVICE — SUT PASSER SYMMETRY OR PRO PUNCTURE CLOSURE DEVICE

## (undated) DEVICE — TROCAR GELPOINT MINI ADVANCED

## (undated) DEVICE — TROCAR COVIDIEN VERSAPORT BLADELESS OPTICAL 12MM STANDARD

## (undated) DEVICE — TROCAR COVIDIEN VERSAONE FIXATION CANNULA 5MM

## (undated) DEVICE — VENODYNE/SCD SLEEVE CALF MEDIUM

## (undated) DEVICE — CLIPPER BLADE GENERAL USE

## (undated) DEVICE — APPLICATOR SURGICEL LAP TROCAR POINT 2.5MM X 150MM

## (undated) DEVICE — GLV 8 PROTEXIS (WHITE)